# Patient Record
Sex: FEMALE | Race: WHITE | NOT HISPANIC OR LATINO | Employment: FULL TIME | URBAN - METROPOLITAN AREA
[De-identification: names, ages, dates, MRNs, and addresses within clinical notes are randomized per-mention and may not be internally consistent; named-entity substitution may affect disease eponyms.]

---

## 2017-02-22 RX ORDER — MEDROXYPROGESTERONE ACETATE 2.5 MG/1
2.5 TABLET ORAL DAILY
Qty: 90 TABLET | Refills: 1 | Status: SHIPPED | OUTPATIENT
Start: 2017-02-22 | End: 2017-03-07 | Stop reason: SDUPTHER

## 2017-03-07 ENCOUNTER — OFFICE VISIT (OUTPATIENT)
Dept: OBSTETRICS AND GYNECOLOGY | Facility: CLINIC | Age: 57
End: 2017-03-07
Payer: COMMERCIAL

## 2017-03-07 VITALS
DIASTOLIC BLOOD PRESSURE: 68 MMHG | WEIGHT: 142.19 LBS | SYSTOLIC BLOOD PRESSURE: 124 MMHG | BODY MASS INDEX: 25.19 KG/M2

## 2017-03-07 DIAGNOSIS — Z12.31 VISIT FOR SCREENING MAMMOGRAM: ICD-10-CM

## 2017-03-07 DIAGNOSIS — N95.2 POSTMENOPAUSAL ATROPHIC VAGINITIS: ICD-10-CM

## 2017-03-07 DIAGNOSIS — Z86.018 HISTORY OF BENIGN PARATHYROID TUMOR: ICD-10-CM

## 2017-03-07 DIAGNOSIS — Z01.419 ENCOUNTER FOR GYNECOLOGICAL EXAMINATION WITHOUT ABNORMAL FINDING: Primary | ICD-10-CM

## 2017-03-07 DIAGNOSIS — Z12.4 CERVICAL CANCER SCREENING: ICD-10-CM

## 2017-03-07 DIAGNOSIS — Z85.3 HISTORY OF BREAST CANCER: ICD-10-CM

## 2017-03-07 DIAGNOSIS — M81.0 OSTEOPOROSIS: ICD-10-CM

## 2017-03-07 DIAGNOSIS — Z11.51 SCREENING FOR HPV (HUMAN PAPILLOMAVIRUS): ICD-10-CM

## 2017-03-07 DIAGNOSIS — N89.0 VAIN I (VAGINAL INTRAEPITHELIAL NEOPLASIA GRADE I): ICD-10-CM

## 2017-03-07 PROCEDURE — 88175 CYTOPATH C/V AUTO FLUID REDO: CPT

## 2017-03-07 PROCEDURE — 87624 HPV HI-RISK TYP POOLED RSLT: CPT

## 2017-03-07 PROCEDURE — 99396 PREV VISIT EST AGE 40-64: CPT | Mod: S$GLB,,, | Performed by: OBSTETRICS & GYNECOLOGY

## 2017-03-07 PROCEDURE — 99999 PR PBB SHADOW E&M-EST. PATIENT-LVL III: CPT | Mod: PBBFAC,,, | Performed by: OBSTETRICS & GYNECOLOGY

## 2017-03-07 RX ORDER — ESTRADIOL 10 UG/1
1 INSERT VAGINAL
Qty: 24 TABLET | Refills: 3 | Status: SHIPPED | OUTPATIENT
Start: 2017-03-09 | End: 2018-01-26 | Stop reason: SDUPTHER

## 2017-03-07 RX ORDER — MEDROXYPROGESTERONE ACETATE 2.5 MG/1
2.5 TABLET ORAL DAILY
Qty: 90 TABLET | Refills: 3 | Status: SHIPPED | OUTPATIENT
Start: 2017-03-07 | End: 2018-03-14 | Stop reason: SDUPTHER

## 2017-03-07 NOTE — PROGRESS NOTES
Chief Complaint   Patient presents with    Well Woman     Pap today, mammo due in August, order in        History of Present Illness: Renate Vang is a 56 y.o. female that presents today 3/7/2017 for well gyn visit. She reports now getting an implant and having to stop boniva.     Past Medical History:   Diagnosis Date    Abnormal Pap smear of cervix     Anxiety     Basal cell carcinoma 3/2011    right axilla    Breast cancer 2003    right    Depression     History of breast cancer 2003    at 42 s/p surg and radiation T1N0M0 ER+    History of HPV infection     LOW RISK    History of parathyroidectomy     R lower gland removed due to adenoma    Kidney stones     Osteoporosis 2016    PONV (postoperative nausea and vomiting)     Skin cancer     BCC     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)        Past Surgical History:   Procedure Laterality Date    BREAST LUMPECTOMY  2003    x2 Right with senital node bx    BREAST RECONSTRUCTION      s/p lumpectomy to replace previous implants bilaterally    BREAST SURGERY      breast augmentation prior to lumpectomy     SECTION, LOW TRANSVERSE      x2    COLONOSCOPY  2013    repeat in 5    HERNIA REPAIR      bilateral groin     LASER ABLATION OF CONDYLOMAS  2012    MALIGNANT SKIN LESION EXCISION      nose & under right arm    PARATHYROID GLAND SURGERY      R lower gland removed due to adenoma    TONSILLECTOMY         Current Outpatient Prescriptions   Medication Sig Dispense Refill    alprazolam (XANAX) 0.25 MG tablet Take 1 tablet (0.25 mg total) by mouth as needed. 60 tablet 1    [START ON 3/9/2017] estradiol (VAGIFEM) 10 mcg Tab Place 1 tablet (10 mcg total) vaginally twice a week. 24 tablet 3    medroxyPROGESTERone (PROVERA) 2.5 MG tablet Take 1 tablet (2.5 mg total) by mouth once daily. 90 tablet 3    ibandronate (BONIVA) 150 mg tablet Take 1 tablet (150 mg total) by mouth every 30 days. Fasting, stay upright and fasting  for 20 minutes 3 tablet 3    promethazine (PHENERGAN) 25 MG tablet Take 1 tablet (25 mg total) by mouth every 4 (four) hours. 30 tablet 0     No current facility-administered medications for this visit.        Review of patient's allergies indicates:   Allergen Reactions    No known allergies        Family History   Problem Relation Age of Onset    Glaucoma Mother     Osteoarthritis Mother     Arrhythmia Mother     Fibromyalgia Mother     Kidney disease Mother     Cancer Maternal Uncle      colon    Hypertension Father     Hashimoto's thyroiditis Sister     Anxiety disorder Sister     Heart disease Maternal Grandmother     COPD Maternal Grandmother     Tuberculosis Maternal Grandmother     Hypertension Maternal Grandmother     COPD Maternal Grandfather     Osteoarthritis Maternal Grandfather     COPD Paternal Grandfather     Breast cancer Cousin     Breast cancer Maternal Aunt     Ovarian cancer Neg Hx        Social History     Social History    Marital status:      Spouse name: N/A    Number of children: N/A    Years of education: N/A     Occupational History     [employer]     Social History Main Topics    Smoking status: Never Smoker    Smokeless tobacco: Never Used    Alcohol use 0.0 oz/week      Comment: 2 drinks three times per week    Drug use: Yes     Special: Benzodiazepines    Sexual activity: Yes     Partners: Male     Other Topics Concern    Are You Pregnant Or Think You May Be? No    Breast-Feeding No     Social History Narrative       OB History    Para Term  AB SAB TAB Ectopic Multiple Living   3 2   1  1         # Outcome Date GA Lbr Manfred/2nd Weight Sex Delivery Anes PTL Lv   3 TAB            2 Para      CS-LTranv      1 Para      CS-LTranv             Review of Symptoms:  GENERAL: Denies weight gain or weight loss. Feeling well overall.   SKIN: Denies rash or lesions.   HEAD: Denies head injury or headache.   NODES: Denies enlarged lymph  nodes.   CHEST: Denies chest pain or shortness of breath.   CARDIOVASCULAR: Denies palpitations or left sided chest pain.   ABDOMEN: No abdominal pain, constipation, diarrhea, nausea, vomiting or rectal bleeding.   URINARY: No frequency, dysuria, hematuria, or burning on urination.  HEMATOLOGIC: No easy bruisability or excessive bleeding.   MUSCULOSKELETAL: Denies joint pain or swelling.     /68  Wt 64.5 kg (142 lb 3.2 oz)  LMP 09/20/2006  BMI 25.19 kg/m2  Physical Exam:  APPEARANCE: Well nourished, well developed, in no acute distress.  SKIN: Normal skin turgor, no lesions.  NECK: Neck symmetric without masses   RESPIRATORY: Normal respiratory effort with no retractions or use of accessory muscles  CARDIOVASCULAR: Peripheral vascular system with no swelling no varicosities and palpation of pulses normal  LYMPHATIC: No enlargements of the lymph nodes noted in the neck, axillae, or groin  ABDOMEN: Soft. No tenderness or masses. No hepatosplenomegaly. No hernias.  BREASTS: Symmetrical, no skin changes or visible lesions. No palpable masses, nipple discharge or adenopathy bilaterally.  PELVIC: Normal external female genitalia without lesions. Normal hair distribution. Adequate perineal body, normal urethral meatus. Urethra with no masses.  Bladder nontender. Vagina moist and well rugated without lesions or discharge. Cervix pink and without lesions. No significant cystocele or rectocele. Bimanual exam showed uterus normal size, shape, position, mobile and nontender. Adnexa without masses or tenderness. Urethra and bladder normal. PAP DONE  EXTREMITIES: No clubbing cyanosis or edema.    ASSESSMENT/PLAN:  Encounter for gynecological examination without abnormal finding    Cervical cancer screening  -     Liquid-based pap smear, screening    Screening for HPV (human papillomavirus)  -     HPV DNA probe, amplified    Visit for screening mammogram  -     Mammo Digital Screening Bilat With CAD; Future; Expected  date: 3/7/17    Osteoporosis    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)--2012    History of breast cancer--2003    History of benign parathyroid tumor    Postmenopausal atrophic vaginitis  -     medroxyPROGESTERone (PROVERA) 2.5 MG tablet; Take 1 tablet (2.5 mg total) by mouth once daily.  Dispense: 90 tablet; Refill: 3  -     estradiol (VAGIFEM) 10 mcg Tab; Place 1 tablet (10 mcg total) vaginally twice a week.  Dispense: 24 tablet; Refill: 3          Patient was counseled today on Pap guidelines, recommendation for pelvic exams, mammograms every other year after the age of 40 and annually after the age of 50, Colonoscopy after the age of 50, Dexa Bone Scan and calcium and vitamin D supplementation in menopause and to see her PCP for other health maintenance.   FOLLOW-UP:prn

## 2017-03-07 NOTE — MR AVS SNAPSHOT
[facility]  [address] [place] [address]  Phone: [phone]                  Renate Vang   3/7/2017 8:00 AM   Office Visit    Description:  Female : 1960   Provider:  Negra Vargas MD   Department:  [facility]           Reason for Visit     Well Woman           Diagnoses this Visit        Comments    Cervical cancer screening    -  Primary     Screening for HPV (human papillomavirus)                To Do List           Future Appointments        Provider Department Dept Phone    3/9/2018 8:00 AM Negra Vargas MD [facility] [phone]      Goals (5 Years of Data)     None      [facility]     [facility] -  Assistance  Registered nurses in the [facility] provide clinical advisement, health education, appointment booking, and other advisory services.  Call for this free service at [phone].             Medications           Message regarding Medications     Verify the changes and/or additions to your medication regime listed below are the same as discussed with your clinician today.  If any of these changes or additions are incorrect, please notify your healthcare provider.             Verify that the below list of medications is an accurate representation of the medications you are currently taking.  If none reported, the list may be blank. If incorrect, please contact your healthcare provider. Carry this list with you in case of emergency.           Current Medications     alprazolam (XANAX) 0.25 MG tablet Take 1 tablet (0.25 mg total) by mouth as needed.    estradiol (VAGIFEM) 10 mcg Tab Place 1 tablet (10 mcg total) vaginally 3 (three) times a week.    medroxyPROGESTERone (PROVERA) 2.5 MG tablet Take 1 tablet (2.5 mg total) by mouth once daily.    ibandronate (BONIVA) 150 mg tablet Take 1 tablet (150 mg total) by mouth every 30 days. Fasting, stay upright and fasting for 20 minutes     promethazine (PHENERGAN) 25 MG tablet Take 1 tablet (25 mg total) by mouth every 4 (four) hours.           Clinical Reference Information           Your Vitals Were     BP Weight Last Period BMI       124/68 64.5 kg (142 lb 3.2 oz) 09/20/2006 25.19 kg/m2       Blood Pressure          Most Recent Value    BP  124/68      Allergies as of 3/7/2017     No Known Allergies      Immunizations Administered on Date of Encounter - 3/7/2017     None      Orders Placed During Today's Visit      Normal Orders This Visit    HPV DNA probe, amplified     Liquid-based pap smear, screening       Language Assistance Services     ATTENTION: Language assistance services are available, free of charge. Please call [phone].      ATENCIÓN: Si habla malika, tiene a arguello disposición servicios gratuitos de asistencia lingüística. Llame al [phone].     CHÚ Ý: N?u b?n nói Ti?ng Vi?t, có các d?ch v? h? tr? ngôn ng? mi?n phí dành cho b?n. G?i s? [phone].         [facility] complies with applicable Federal civil rights laws and does not discriminate on the basis of race, color, national origin, age, disability, or sex.

## 2017-03-15 LAB — HUMAN PAPILLOMAVIRUS (HPV): NOT DETECTED

## 2017-08-14 ENCOUNTER — HOSPITAL ENCOUNTER (OUTPATIENT)
Dept: RADIOLOGY | Facility: HOSPITAL | Age: 57
Discharge: HOME | End: 2017-08-14
Attending: OBSTETRICS & GYNECOLOGY
Payer: COMMERCIAL

## 2017-08-14 DIAGNOSIS — Z12.31 VISIT FOR SCREENING MAMMOGRAM: ICD-10-CM

## 2017-08-14 PROCEDURE — 77063 BREAST TOMOSYNTHESIS BI: CPT | Mod: 26,,, | Performed by: RADIOLOGY

## 2017-08-14 PROCEDURE — 77067 SCR MAMMO BI INCL CAD: CPT | Mod: TC

## 2017-08-14 PROCEDURE — 77067 SCR MAMMO BI INCL CAD: CPT | Mod: 26,,, | Performed by: RADIOLOGY

## 2017-12-18 ENCOUNTER — OFFICE VISIT (OUTPATIENT)
Dept: FAMILY MEDICINE | Facility: CLINIC | Age: 57
End: 2017-12-18
Payer: COMMERCIAL

## 2017-12-18 VITALS
DIASTOLIC BLOOD PRESSURE: 70 MMHG | RESPIRATION RATE: 12 BRPM | SYSTOLIC BLOOD PRESSURE: 116 MMHG | WEIGHT: 145.06 LBS | BODY MASS INDEX: 25.7 KG/M2 | HEIGHT: 63 IN | TEMPERATURE: 98 F | HEART RATE: 80 BPM

## 2017-12-18 DIAGNOSIS — M54.6 ACUTE LEFT-SIDED THORACIC BACK PAIN: Primary | ICD-10-CM

## 2017-12-18 DIAGNOSIS — N20.0 RENAL CALCULI: ICD-10-CM

## 2017-12-18 DIAGNOSIS — R31.29 OTHER MICROSCOPIC HEMATURIA: ICD-10-CM

## 2017-12-18 LAB
BILIRUB SERPL-MCNC: ABNORMAL MG/DL
BLOOD URINE, POC: ABNORMAL
COLOR, POC UA: CLEAR
GLUCOSE UR QL STRIP: NORMAL
KETONES UR QL STRIP: ABNORMAL
LEUKOCYTE ESTERASE URINE, POC: ABNORMAL
NITRITE, POC UA: ABNORMAL
PH, POC UA: ABNORMAL
PROTEIN, POC: 3
SPECIFIC GRAVITY, POC UA: ABNORMAL
UROBILINOGEN, POC UA: NORMAL

## 2017-12-18 PROCEDURE — 99214 OFFICE O/P EST MOD 30 MIN: CPT | Mod: 25,S$GLB,, | Performed by: NURSE PRACTITIONER

## 2017-12-18 PROCEDURE — 81002 URINALYSIS NONAUTO W/O SCOPE: CPT | Mod: S$GLB,,, | Performed by: NURSE PRACTITIONER

## 2017-12-18 RX ORDER — TRAMADOL HYDROCHLORIDE 50 MG/1
50 TABLET ORAL EVERY 6 HOURS PRN
COMMUNITY
End: 2020-07-27 | Stop reason: SDUPTHER

## 2017-12-18 NOTE — PROGRESS NOTES
"Subjective:       Patient ID: Renate Vang is a 57 y.o. female.    Chief Complaint: left side pain, hx of kidney stones    HPI having some left flank pain. Got really dehydrated about a week ago. Started after that. having some ache in the flank area. Taking pain medication to help with this. She states she has had this in the past. Was noted on CT scan last year to have several renal stones. She states she just wanted to make sure she did not have an infection. She denies any decreased urine flow or inability to void. No fever. See ROS.    The following portion of the patients history was reviewed and updated as appropriate: allergies, current medications, past medical and surgical history. Past social history and problem list reviewed. Family PMH and Past social history reviewed. Tobacco, Illicit drug use reviewed.     Review of Systems  Constitutional: No fatigue or fever    Respiratory:   No SOB, Wheezing, cough  Cardiovascular:   No CP, Palpitations  Gastrointestinal:   No N/V/D. No abdominal pain, weight stable. Appetite good.   Genitourinary:   No dysuria, urgency or frequency. No change in bowels. No blood in stools. Some left sided flank achy type pain.  Musculoskeletal:   No joint pain  No change in gait or coordination. .  Neurological:   No dizziness. No headaches    Objective:     /70   Pulse 80   Temp 98.2 °F (36.8 °C) (Oral)   Resp 12   Ht 5' 3" (1.6 m)   Wt 65.8 kg (145 lb 1 oz)   LMP 09/20/2006   BMI 25.70 kg/m²      Physical Exam     Constitutional: oriented to person, place, and time. well-developed and well-nourished.   Cardiovascular: Normal rate, regular rhythm and normal heart sounds.    Pulmonary/Chest: Effort normal and breath sounds normal. No respiratory distress. No wheezes.   GI: no pain with percussion over left flank area  Musculoskeletal: Normal range of motion. Gait and coordination normal.     Assessment:       1. Acute left-sided thoracic back pain    2. Other " microscopic hematuria    3. Renal calculi        Plan:         Renate was seen today for left side pain, hx of kidney stones.    Diagnoses and all orders for this visit:    Acute left-sided thoracic back pain: no indication of infection. She does not want to have repeat CT scan done. She just wanted to make sure there was no need for antibiotics. She will follow up if things do not improve.   -     POCT URINE DIPSTICK WITHOUT MICROSCOPE    Other microscopic hematuria    Renal calculi    Continue current medication  Take medications only as prescribed  Healthy diet, exercise  Adequate rest  Adequate hydration  Avoid allergens  Avoid excessive caffeine

## 2018-01-26 DIAGNOSIS — N95.2 POSTMENOPAUSAL ATROPHIC VAGINITIS: ICD-10-CM

## 2018-01-26 RX ORDER — ESTRADIOL 10 UG/1
TABLET VAGINAL
Qty: 24 TABLET | Refills: 3 | Status: SHIPPED | OUTPATIENT
Start: 2018-01-26 | End: 2018-04-17 | Stop reason: SDUPTHER

## 2018-03-14 DIAGNOSIS — N95.2 POSTMENOPAUSAL ATROPHIC VAGINITIS: ICD-10-CM

## 2018-03-14 RX ORDER — MEDROXYPROGESTERONE ACETATE 2.5 MG/1
TABLET ORAL
Qty: 90 TABLET | Refills: 3 | Status: SHIPPED | OUTPATIENT
Start: 2018-03-14 | End: 2018-04-17 | Stop reason: SDUPTHER

## 2018-04-17 ENCOUNTER — OFFICE VISIT (OUTPATIENT)
Dept: OBSTETRICS AND GYNECOLOGY | Facility: CLINIC | Age: 58
End: 2018-04-17
Payer: COMMERCIAL

## 2018-04-17 VITALS — BODY MASS INDEX: 26.21 KG/M2 | WEIGHT: 142.44 LBS | HEIGHT: 62 IN

## 2018-04-17 DIAGNOSIS — N95.2 POSTMENOPAUSAL ATROPHIC VAGINITIS: ICD-10-CM

## 2018-04-17 DIAGNOSIS — Z12.4 PAP SMEAR FOR CERVICAL CANCER SCREENING: ICD-10-CM

## 2018-04-17 DIAGNOSIS — Z12.11 COLON CANCER SCREENING: ICD-10-CM

## 2018-04-17 DIAGNOSIS — Z01.419 ENCOUNTER FOR GYNECOLOGICAL EXAMINATION WITHOUT ABNORMAL FINDING: Primary | ICD-10-CM

## 2018-04-17 DIAGNOSIS — N89.0 VAIN I (VAGINAL INTRAEPITHELIAL NEOPLASIA GRADE I): ICD-10-CM

## 2018-04-17 DIAGNOSIS — M81.0 OSTEOPOROSIS, UNSPECIFIED OSTEOPOROSIS TYPE, UNSPECIFIED PATHOLOGICAL FRACTURE PRESENCE: ICD-10-CM

## 2018-04-17 DIAGNOSIS — Z11.51 SCREENING FOR HPV (HUMAN PAPILLOMAVIRUS): ICD-10-CM

## 2018-04-17 PROCEDURE — 88141 CYTOPATH C/V INTERPRET: CPT | Mod: ,,, | Performed by: PATHOLOGY

## 2018-04-17 PROCEDURE — 99999 PR PBB SHADOW E&M-EST. PATIENT-LVL III: CPT | Mod: PBBFAC,,, | Performed by: OBSTETRICS & GYNECOLOGY

## 2018-04-17 PROCEDURE — 87624 HPV HI-RISK TYP POOLED RSLT: CPT

## 2018-04-17 PROCEDURE — 99396 PREV VISIT EST AGE 40-64: CPT | Mod: S$GLB,,, | Performed by: OBSTETRICS & GYNECOLOGY

## 2018-04-17 PROCEDURE — 88175 CYTOPATH C/V AUTO FLUID REDO: CPT | Performed by: PATHOLOGY

## 2018-04-17 RX ORDER — IBANDRONATE SODIUM 150 MG/1
150 TABLET, FILM COATED ORAL
Qty: 3 TABLET | Refills: 3 | Status: SHIPPED | OUTPATIENT
Start: 2018-04-17 | End: 2019-04-18

## 2018-04-17 RX ORDER — MEDROXYPROGESTERONE ACETATE 2.5 MG/1
2.5 TABLET ORAL DAILY
Qty: 90 TABLET | Refills: 3 | Status: SHIPPED | OUTPATIENT
Start: 2018-04-17 | End: 2019-04-30 | Stop reason: SDUPTHER

## 2018-04-17 RX ORDER — ESTRADIOL 10 UG/1
INSERT VAGINAL
Qty: 24 TABLET | Refills: 3 | Status: SHIPPED | OUTPATIENT
Start: 2018-04-17 | End: 2019-04-18 | Stop reason: SDUPTHER

## 2018-04-17 NOTE — PROGRESS NOTES
Chief Complaint   Patient presents with    Well Woman       History of Present Illness: Renate aVng is a 57 y.o. female that presents today 2018 for well gyn visit.    Past Medical History:   Diagnosis Date    Abnormal Pap smear of cervix     Anxiety     Basal cell carcinoma 3/2011    right axilla    Breast cancer 2003    right    Depression     History of breast cancer 2003    at 42 s/p surg and radiation T1N0M0 ER+    History of HPV infection     LOW RISK    History of parathyroidectomy     R lower gland removed due to adenoma    Kidney stones     Osteoporosis 2016    PONV (postoperative nausea and vomiting)     Skin cancer     BCC     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) 2012       Past Surgical History:   Procedure Laterality Date    BREAST LUMPECTOMY  2003    x2 Right with senital node bx    BREAST RECONSTRUCTION      s/p lumpectomy to replace previous implants bilaterally    BREAST SURGERY      breast augmentation prior to lumpectomy     SECTION, LOW TRANSVERSE      x2    COLONOSCOPY  2013    repeat in 5    HERNIA REPAIR      bilateral groin     LASER ABLATION OF CONDYLOMAS  2012    MALIGNANT SKIN LESION EXCISION      nose & under right arm    PARATHYROID GLAND SURGERY      R lower gland removed due to adenoma    TONSILLECTOMY         Current Outpatient Prescriptions   Medication Sig Dispense Refill    alprazolam (XANAX) 0.25 MG tablet Take 1 tablet (0.25 mg total) by mouth as needed. 60 tablet 1    estradiol (YUVAFEM) 10 mcg Tab INSERT 1 TABLET VAGINALLY 2TIMES A WEEK 24 tablet 3    medroxyPROGESTERone (PROVERA) 2.5 MG tablet Take 1 tablet (2.5 mg total) by mouth once daily. 90 tablet 3    ibandronate (BONIVA) 150 mg tablet Take 1 tablet (150 mg total) by mouth every 30 days. Fasting, stay upright and fasting for 20 minutes 3 tablet 3    promethazine (PHENERGAN) 25 MG tablet Take 1 tablet (25 mg total) by mouth every 4 (four) hours. 30 tablet 0     traMADol (ULTRAM) 50 mg tablet Take 50 mg by mouth every 6 (six) hours as needed for Pain.       No current facility-administered medications for this visit.        Review of patient's allergies indicates:   Allergen Reactions    No known allergies        Family History   Problem Relation Age of Onset    Glaucoma Mother     Osteoarthritis Mother     Arrhythmia Mother     Fibromyalgia Mother     Kidney disease Mother     Cancer Maternal Uncle      colon    Hypertension Father     Hashimoto's thyroiditis Sister     Anxiety disorder Sister     Heart disease Maternal Grandmother     COPD Maternal Grandmother     Tuberculosis Maternal Grandmother     Hypertension Maternal Grandmother     COPD Maternal Grandfather     Osteoarthritis Maternal Grandfather     COPD Paternal Grandfather     Breast cancer Cousin     Breast cancer Maternal Aunt     Ovarian cancer Neg Hx        Social History     Social History    Marital status:      Spouse name: N/A    Number of children: N/A    Years of education: N/A     Occupational History     [employer]     Social History Main Topics    Smoking status: Never Smoker    Smokeless tobacco: Never Used    Alcohol use 2.4 oz/week     4 Glasses of wine per week      Comment: 2 drinks three times per week    Drug use: Yes     Types: Benzodiazepines    Sexual activity: Yes     Partners: Male     Birth control/ protection: Post-menopausal     Other Topics Concern    Are You Pregnant Or Think You May Be? No    Breast-Feeding No     Social History Narrative    None       OB History    Para Term  AB Living   5 4 2   1     SAB TAB Ectopic Multiple Live Births     1            # Outcome Date GA Lbr Manfred/2nd Weight Sex Delivery Anes PTL Lv   5 Term            4 Term            3 TAB            2 Para      CS-LTranv      1 Para      CS-LTranv             Review of Symptoms:  GENERAL: Denies weight gain or weight loss. Feeling well overall.  "  SKIN: Denies rash or lesions.   HEAD: Denies head injury or headache.   NODES: Denies enlarged lymph nodes.   CHEST: Denies chest pain or shortness of breath.   CARDIOVASCULAR: Denies palpitations or left sided chest pain.   ABDOMEN: No abdominal pain, constipation, diarrhea, nausea, vomiting or rectal bleeding.   URINARY: No frequency, dysuria, hematuria, or burning on urination.  HEMATOLOGIC: No easy bruisability or excessive bleeding.   MUSCULOSKELETAL: Denies joint pain or swelling.     Ht 5' 2" (1.575 m)   Wt 64.6 kg (142 lb 6.7 oz)   LMP 09/20/2006   Physical Exam:  APPEARANCE: Well nourished, well developed, in no acute distress.  SKIN: Normal skin turgor, no lesions.  NECK: Neck symmetric without masses   RESPIRATORY: Normal respiratory effort with no retractions or use of accessory muscles  CARDIOVASCULAR: Peripheral vascular system with no swelling no varicosities and palpation of pulses normal  LYMPHATIC: No enlargements of the lymph nodes noted in the neck, axillae, or groin  ABDOMEN: Soft. No tenderness or masses. No hepatosplenomegaly. No hernias.  BREASTS: Symmetrical, no skin changes or visible lesions. No palpable masses, nipple discharge or adenopathy bilaterally.  PELVIC: Normal external female genitalia without lesions. Normal hair distribution. Adequate perineal body, normal urethral meatus. Urethra with no masses.  Bladder nontender. Vagina moist and well rugated without lesions or discharge. Cervix pink and without lesions. No significant cystocele or rectocele. Bimanual exam showed uterus normal size, shape, position, mobile and nontender. Adnexa without masses or tenderness. Urethra and bladder normal.   EXTREMITIES: No clubbing cyanosis or edema.    ASSESSMENT/PLAN:  Encounter for gynecological examination without abnormal finding    Osteoporosis, unspecified osteoporosis type, unspecified pathological fracture presence  -     DXA Bone Density Spine And Hip; Future; Expected date: " 04/17/2018    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)--2012    Postmenopausal atrophic vaginitis  -     estradiol (YUVAFEM) 10 mcg Tab; INSERT 1 TABLET VAGINALLY 2TIMES A WEEK  Dispense: 24 tablet; Refill: 3  -     medroxyPROGESTERone (PROVERA) 2.5 MG tablet; Take 1 tablet (2.5 mg total) by mouth once daily.  Dispense: 90 tablet; Refill: 3    Colon cancer screening  -     Case request GI: COLONOSCOPY    Other orders  -     ibandronate (BONIVA) 150 mg tablet; Take 1 tablet (150 mg total) by mouth every 30 days. Fasting, stay upright and fasting for 20 minutes  Dispense: 3 tablet; Refill: 3          Patient was counseled today on Pap guidelines, recommendation for pelvic exams, mammograms every other year after the age of 40 and annually after the age of 50, Colonoscopy after the age of 50, Dexa Bone Scan and calcium and vitamin D supplementation in menopause and to see her PCP for other health maintenance.   FOLLOW-UP:prn

## 2018-04-23 ENCOUNTER — HOSPITAL ENCOUNTER (OUTPATIENT)
Dept: RADIOLOGY | Facility: HOSPITAL | Age: 58
Discharge: HOME | End: 2018-04-23
Attending: OBSTETRICS & GYNECOLOGY
Payer: COMMERCIAL

## 2018-04-23 DIAGNOSIS — M81.0 OSTEOPOROSIS, UNSPECIFIED OSTEOPOROSIS TYPE, UNSPECIFIED PATHOLOGICAL FRACTURE PRESENCE: ICD-10-CM

## 2018-04-23 LAB
HPV16 AG SPEC QL: NEGATIVE
HPV16+18+H RISK 12 DNA CVX-IMP: NEGATIVE
HPV18 DNA SPEC QL NAA+PROBE: NEGATIVE

## 2018-04-23 PROCEDURE — 77080 DXA BONE DENSITY AXIAL: CPT | Mod: TC,PO

## 2018-04-23 PROCEDURE — 77080 DXA BONE DENSITY AXIAL: CPT | Mod: 26,,, | Performed by: RADIOLOGY

## 2018-05-02 ENCOUNTER — TELEPHONE (OUTPATIENT)
Dept: GASTROENTEROLOGY | Facility: CLINIC | Age: 58
End: 2018-05-02

## 2018-05-02 NOTE — TELEPHONE ENCOUNTER
Received request for pt to have screening colonoscopy    Reviewed chart pt's last colonoscopy was 9/20/13  The type polyp recommended repeat in 5 years      Pt informed.  Discussed either scheduling it now for   September or reminder to be entered to mail out to her closed to due date.  Pt prefers to wait until closer to September    Just FYI if we do preventative colon before the recommended repeat date(even within days)  insurance will not consider it preventative.

## 2018-07-23 ENCOUNTER — OFFICE VISIT (OUTPATIENT)
Dept: DERMATOLOGY | Facility: CLINIC | Age: 58
End: 2018-07-23
Payer: COMMERCIAL

## 2018-07-23 VITALS — BODY MASS INDEX: 26.13 KG/M2 | WEIGHT: 142 LBS | HEIGHT: 62 IN | RESPIRATION RATE: 16 BRPM

## 2018-07-23 DIAGNOSIS — D23.9 DERMATOFIBROMA: ICD-10-CM

## 2018-07-23 DIAGNOSIS — I78.1 TELANGIECTASIA: ICD-10-CM

## 2018-07-23 DIAGNOSIS — L57.0 ACTINIC KERATOSIS: Primary | ICD-10-CM

## 2018-07-23 DIAGNOSIS — D18.00 ANGIOMA: ICD-10-CM

## 2018-07-23 DIAGNOSIS — Z85.828 PERSONAL HISTORY OF MALIGNANT NEOPLASM OF SKIN: ICD-10-CM

## 2018-07-23 DIAGNOSIS — D22.9 BENIGN NEVUS: ICD-10-CM

## 2018-07-23 DIAGNOSIS — L72.0 MILIUM: ICD-10-CM

## 2018-07-23 DIAGNOSIS — D48.5 NEOPLASM OF UNCERTAIN BEHAVIOR OF SKIN: ICD-10-CM

## 2018-07-23 DIAGNOSIS — L82.1 SEBORRHEIC KERATOSES: ICD-10-CM

## 2018-07-23 DIAGNOSIS — L81.4 LENTIGINES: ICD-10-CM

## 2018-07-23 PROCEDURE — 3008F BODY MASS INDEX DOCD: CPT | Mod: CPTII,S$GLB,, | Performed by: DERMATOLOGY

## 2018-07-23 PROCEDURE — 11100 PR BIOPSY OF SKIN LESION: CPT | Mod: S$GLB,,, | Performed by: DERMATOLOGY

## 2018-07-23 PROCEDURE — 88305 TISSUE EXAM BY PATHOLOGIST: CPT | Performed by: PATHOLOGY

## 2018-07-23 PROCEDURE — 99999 PR PBB SHADOW E&M-EST. PATIENT-LVL III: CPT | Mod: PBBFAC,,, | Performed by: DERMATOLOGY

## 2018-07-23 PROCEDURE — 99203 OFFICE O/P NEW LOW 30 MIN: CPT | Mod: 25,S$GLB,, | Performed by: DERMATOLOGY

## 2018-07-23 RX ORDER — FLUOROURACIL 50 MG/G
CREAM TOPICAL 2 TIMES DAILY
Qty: 40 G | Refills: 1 | Status: SHIPPED | OUTPATIENT
Start: 2018-07-23 | End: 2018-08-17

## 2018-07-23 NOTE — LETTER
July 23, 2018      Sameera Dudley MD  [address] [address] [address]           [facility]  [address] [facility] [address] [place] [address]  Phone: [phone]  Fax: [phone]          Patient: Renate Vang   MR Number: [id number]   YOB: 1960   Date of Visit: 7/23/2018       Dear Dr. Sameera Dudley:    Thank you for referring Renate Vang to me for evaluation. Attached you will find relevant portions of my assessment and plan of care.    If you have questions, please do not hesitate to call me. I look forward to following Renate Vang along with you.    Sincerely,    Gemma Tan MD    Enclosure  CC:  No Recipients    If you would like to receive this communication electronically, please contact [email or web address] or [phone] to request more information on [facility] [employer] access.    For providers and/or their staff who would like to refer a patient to [facility], please contact us through our one-stop-shop provider referral line, [facility], at [phone].    If you feel you have received this communication in error or would no longer like to receive these types of communications, please e-mail [email or web address]

## 2018-08-02 ENCOUNTER — TELEPHONE (OUTPATIENT)
Dept: DERMATOLOGY | Facility: CLINIC | Age: 58
End: 2018-08-02

## 2018-08-02 NOTE — TELEPHONE ENCOUNTER
----- Message from Gemma Tan MD sent at 8/2/2018  8:04 AM CDT -----  RIGHT CHEST:  Basal cell carcinoma, nodular type.  The lesion involves the deep margin of the biopsy    Please schedule pt for 30 min procedure for ED&C vs excision   HETAL

## 2018-08-17 ENCOUNTER — PROCEDURE VISIT (OUTPATIENT)
Dept: DERMATOLOGY | Facility: CLINIC | Age: 58
End: 2018-08-17
Payer: COMMERCIAL

## 2018-08-17 VITALS — WEIGHT: 142 LBS | BODY MASS INDEX: 26.13 KG/M2 | HEIGHT: 62 IN

## 2018-08-17 DIAGNOSIS — C44.519 BASAL CELL CARCINOMA (BCC) OF CHEST: Primary | ICD-10-CM

## 2018-08-17 PROCEDURE — 17261 DSTRJ MAL LES T/A/L .6-1.0CM: CPT | Mod: S$GLB,,, | Performed by: DERMATOLOGY

## 2018-08-17 PROCEDURE — 99499 UNLISTED E&M SERVICE: CPT | Mod: S$GLB,,, | Performed by: DERMATOLOGY

## 2018-08-17 NOTE — PROGRESS NOTES
Subjective:       Patient ID:  Renate Vang is a 57 y.o. female who presents for   Chief Complaint   Patient presents with    Basal Cell Carcinoma     Pt here for treatment of BCC    FINAL PATHOLOGIC DIAGNOSIS  RIGHT CHEST:  Basal cell carcinoma, nodular type.  The lesion involves the deep margin of the biopsy.        Review of Systems   Skin: Positive for daily sunscreen use and activity-related sunscreen use. Negative for itching, rash, tendency to form keloidal scars and recent sunburn.   Hematologic/Lymphatic: Does not bruise/bleed easily.   Allergic/Immunologic: Positive for environmental allergies.        Objective:    Physical Exam   Skin:   Areas Examined (abnormalities noted in diagram):   Head / Face Inspection Performed  Chest / Axilla Inspection Performed              Diagram Legend      See annotation      Assessment / Plan:        Basal cell carcinoma (BCC) of chest    Discussed excision vs ED&C, risk of recurrence, potential for keloid to form on the chest  Decided to proceed with ED&C of BCC on the R chest bx done on 7/23/18:  Electrodessication and Curettage Procedure note:    Verbal consent obtained. Lesional tissue marked and prepped with alcohol. Lesion anesthetized with 1% lidocaine with epinephrine. Curettage and Desiccation x 3 cycles to base. Aluminum chloride for hemostasis. Lesion size after primary curettage: 9 mm    Area bandaged and wound care explained.           Follow-up in about 3 months (around 11/17/2018).

## 2018-08-21 ENCOUNTER — HOSPITAL ENCOUNTER (OUTPATIENT)
Dept: RADIOLOGY | Facility: HOSPITAL | Age: 58
Discharge: HOME | End: 2018-08-21
Attending: NURSE PRACTITIONER
Payer: COMMERCIAL

## 2018-08-21 DIAGNOSIS — Z12.39 ENCOUNTER FOR SPECIAL SCREENING EXAMINATION FOR NEOPLASM OF BREAST: ICD-10-CM

## 2018-08-21 PROCEDURE — 77067 SCR MAMMO BI INCL CAD: CPT | Mod: TC,PO

## 2018-08-21 PROCEDURE — 77067 SCR MAMMO BI INCL CAD: CPT | Mod: 26,,, | Performed by: RADIOLOGY

## 2018-08-21 PROCEDURE — 77063 BREAST TOMOSYNTHESIS BI: CPT | Mod: 26,,, | Performed by: RADIOLOGY

## 2018-08-23 ENCOUNTER — PATIENT MESSAGE (OUTPATIENT)
Dept: OBSTETRICS AND GYNECOLOGY | Facility: CLINIC | Age: 58
End: 2018-08-23

## 2018-08-23 DIAGNOSIS — R92.8 ABNORMAL MAMMOGRAM OF LEFT BREAST: Primary | ICD-10-CM

## 2018-08-23 NOTE — TELEPHONE ENCOUNTER
Are there any openings for dx mammogram sooner thatn 9/5/18. Hx of breast ca with abnormal mammogram.

## 2018-08-24 ENCOUNTER — TELEPHONE (OUTPATIENT)
Dept: RADIOLOGY | Facility: HOSPITAL | Age: 58
End: 2018-08-24

## 2018-08-24 NOTE — TELEPHONE ENCOUNTER
Appointment scheduled on 9/5/2018 for diagnostic mammogram  Patient will call back to rescheduled mammogram

## 2018-08-29 ENCOUNTER — HOSPITAL ENCOUNTER (OUTPATIENT)
Dept: RADIOLOGY | Facility: HOSPITAL | Age: 58
Discharge: HOME | End: 2018-08-29
Attending: OBSTETRICS & GYNECOLOGY
Payer: COMMERCIAL

## 2018-08-29 DIAGNOSIS — R92.8 ABNORMAL MAMMOGRAM OF LEFT BREAST: ICD-10-CM

## 2018-08-29 PROCEDURE — 77065 DX MAMMO INCL CAD UNI: CPT | Mod: TC,PO,LT

## 2018-08-29 PROCEDURE — 77065 DX MAMMO INCL CAD UNI: CPT | Mod: 26,LT,, | Performed by: RADIOLOGY

## 2018-08-29 PROCEDURE — 77061 BREAST TOMOSYNTHESIS UNI: CPT | Mod: TC,PO,LT

## 2018-08-29 PROCEDURE — 76642 ULTRASOUND BREAST LIMITED: CPT | Mod: 26,LT,, | Performed by: RADIOLOGY

## 2018-08-29 PROCEDURE — 77061 BREAST TOMOSYNTHESIS UNI: CPT | Mod: 26,LT,, | Performed by: RADIOLOGY

## 2018-08-29 PROCEDURE — 76642 ULTRASOUND BREAST LIMITED: CPT | Mod: TC,PO,LT

## 2018-09-10 ENCOUNTER — PATIENT MESSAGE (OUTPATIENT)
Dept: OBSTETRICS AND GYNECOLOGY | Facility: CLINIC | Age: 58
End: 2018-09-10

## 2018-09-12 ENCOUNTER — TELEPHONE (OUTPATIENT)
Dept: GASTROENTEROLOGY | Facility: CLINIC | Age: 58
End: 2018-09-12

## 2018-09-12 NOTE — TELEPHONE ENCOUNTER
----- Message from Alisia Henderson sent at 9/12/2018 12:12 PM CDT -----  Contact: Patient  Patient would like to schedule a colonoscopy, please call to schedule [phone]

## 2018-09-17 ENCOUNTER — TELEPHONE (OUTPATIENT)
Dept: GASTROENTEROLOGY | Facility: CLINIC | Age: 58
End: 2018-09-17

## 2018-09-17 NOTE — TELEPHONE ENCOUNTER
----- Message from Paulina Correa sent at 9/17/2018  2:14 PM CDT -----  Contact: Renate  Type:  Patient Returning Call    Who Called:  patient  Who Left Message for Patient:  Jennifer   Does the patient know what this is regarding?:  appointment  Best Call Back Number:  [phone]  Additional Information:  na

## 2018-11-01 ENCOUNTER — TELEPHONE (OUTPATIENT)
Dept: GASTROENTEROLOGY | Facility: CLINIC | Age: 58
End: 2018-11-01

## 2018-11-01 NOTE — TELEPHONE ENCOUNTER
----- Message from Nathaly Moran sent at 11/1/2018  8:20 AM CDT -----  Contact: pt  Pt calling states would like to speak to the nurse concerning she took an aleve and wants to know if it will effect her upcoming colonoscopy....[phone]

## 2018-11-05 RX ORDER — GLUCOSAMINE/CHONDRO SU A 500-400 MG
1 TABLET ORAL 2 TIMES DAILY
COMMUNITY

## 2018-11-05 RX ORDER — MAGNESIUM 30 MG
TABLET ORAL ONCE
COMMUNITY
End: 2021-06-01 | Stop reason: ALTCHOICE

## 2018-11-05 RX ORDER — ZINC GLUCONATE 50 MG
25 TABLET ORAL DAILY
COMMUNITY

## 2018-11-05 NOTE — H&P
History & Physical - Short Stay  Gastroenterology      SUBJECTIVE:     Procedure: Colonoscopy    Chief Complaint/Indication for Procedure: Screening    History of Present Illness:  Asymptomatic    Last colonoscopy 9/20/2013  Impression:  - One 1 mm polyp at the hepatic flexure. Resected                        and retrieved.                       - One 1 mm polyp in the cecum. Resected and                        retrieved.                       - The examination was otherwise normal.                       - The examined portion of the ileum was normal.  Recommendation:      - Discharge patient to home.                       - Await pathology results.                       - If the pathology report reveals adenomatous                        tissue, then repeat the colonoscopy for surveillance                        in 5 years.                       - If the pathology report indicates hyperplastic                        polyp, then repeat colonoscopy for surveillance in                        6-7 years (age 59).                       - Call the G.I. clinic in 2 weeks for reports (if                        you haven't heard from us sooner) [phone].                       - High fiber diet.                       - Take a PROBIOTIC, such as a carton of GREEK YOGURT                        (Chobani or Oikos, or Activia or Dannon); or tablets                        of ALIGN or CULTURELLE or TIO-Q (all                        non-prescription), every day for a month.  Khanh Forrester MD  9/20/2013     No medications prior to admission.       Review of patient's allergies indicates:   Allergen Reactions    Latex, natural rubber         Past Medical History:   Diagnosis Date    Abnormal Pap smear of cervix     Anxiety resolved    Basal cell carcinoma 3/2011    right axilla    Breast cancer 2003    right    Depression     History of breast cancer 2003    at 42 s/p surg and radiation T1N0M0 ER+    History of HPV  infection     LOW RISK    History of parathyroidectomy     R lower gland removed due to adenoma    Kidney stones     Osteoporosis 2016    PONV (postoperative nausea and vomiting)     Skin cancer     BCC     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) 2012     Past Surgical History:   Procedure Laterality Date    BREAST LUMPECTOMY  2003    x2 Right with senital node bx    BREAST RECONSTRUCTION      s/p lumpectomy to replace previous implants bilaterally    BREAST SURGERY      breast augmentation prior to lumpectomy     SECTION, LOW TRANSVERSE      x2    COLONOSCOPY  2013    repeat in 5    COLONOSCOPY N/A 2013    Performed by [facility] Jr[facility] at [facility]    HERNIA REPAIR      bilateral groin     LASER ABLATION OF CONDYLOMAS  2012    MALIGNANT SKIN LESION EXCISION      nose & under right arm    PARATHYROID GLAND SURGERY      R lower gland removed due to adenoma    TONSILLECTOMY       Family History   Problem Relation Age of Onset    Glaucoma Mother     Osteoarthritis Mother     Arrhythmia Mother     Fibromyalgia Mother     Kidney disease Mother     Cancer Maternal Uncle         colon    Hypertension Father     Hashimoto's thyroiditis Sister     Anxiety disorder Sister     Heart disease Maternal Grandmother     COPD Maternal Grandmother     Tuberculosis Maternal Grandmother     Hypertension Maternal Grandmother     COPD Maternal Grandfather     Osteoarthritis Maternal Grandfather     COPD Paternal Grandfather     Breast cancer Cousin     Breast cancer Maternal Aunt     Ovarian cancer Neg Hx      Social History     Tobacco Use    Smoking status: Never Smoker    Smokeless tobacco: Never Used   Substance Use Topics    Alcohol use: Yes     Alcohol/week: 2.4 oz     Types: 4 Glasses of wine per week     Comment: 2 drinks three times per week    Drug use: Yes     Types: Benzodiazepines         OBJECTIVE:     Vital Signs (Most Recent)       Physical Exam:  :                                                         GENERAL:  Comfortable, in no acute distress.                                 HEENT EXAM:  Nonicteric.  No adenopathy.  Oropharynx is clear.               NECK:  Supple.                                                               LUNGS:  Clear.                                                               CARDIAC:  Regular rate and rhythm.  S1, S2.  No murmur.                      ABDOMEN:  Soft, positive bowel sounds, nontender.  No hepatosplenomegaly or masses.  No rebound or guarding.                                             EXTREMITIES:  No edema.     MENTAL STATUS:  Alert and oriented.    ASSESSMENT/PLAN:     Assessment: Colorectal cancer screening    Plan: Colonoscopy    Anesthesia Plan:   MAC / General Anaesthesia    ASA Grade: ASA 2 - Patient with mild systemic disease with no functional limitations    MALLAMPATI SCORE: I (soft palate, uvula, fauces, and tonsillar pillars visible)

## 2018-11-06 ENCOUNTER — ANESTHESIA EVENT (OUTPATIENT)
Dept: ENDOSCOPY | Facility: HOSPITAL | Age: 58
End: 2018-11-06
Payer: COMMERCIAL

## 2018-11-06 ENCOUNTER — ANESTHESIA (OUTPATIENT)
Dept: ENDOSCOPY | Facility: HOSPITAL | Age: 58
End: 2018-11-06
Payer: COMMERCIAL

## 2018-11-06 ENCOUNTER — HOSPITAL ENCOUNTER (OUTPATIENT)
Facility: HOSPITAL | Age: 58
Discharge: HOME | End: 2018-11-06
Attending: INTERNAL MEDICINE | Admitting: INTERNAL MEDICINE
Payer: COMMERCIAL

## 2018-11-06 VITALS
BODY MASS INDEX: 24.63 KG/M2 | DIASTOLIC BLOOD PRESSURE: 66 MMHG | RESPIRATION RATE: 14 BRPM | WEIGHT: 139 LBS | HEART RATE: 72 BPM | OXYGEN SATURATION: 99 % | TEMPERATURE: 97 F | HEIGHT: 63 IN | SYSTOLIC BLOOD PRESSURE: 114 MMHG

## 2018-11-06 DIAGNOSIS — Z86.010 HISTORY OF COLON POLYPS: ICD-10-CM

## 2018-11-06 PROBLEM — Z86.0100 HISTORY OF COLON POLYPS: Status: ACTIVE | Noted: 2018-11-06

## 2018-11-06 PROCEDURE — 37000009 HC ANESTHESIA EA ADD 15 MINS: Mod: PO | Performed by: INTERNAL MEDICINE

## 2018-11-06 PROCEDURE — 37000008 HC ANESTHESIA 1ST 15 MINUTES: Mod: PO | Performed by: INTERNAL MEDICINE

## 2018-11-06 PROCEDURE — 45385 COLONOSCOPY W/LESION REMOVAL: CPT | Mod: PO | Performed by: INTERNAL MEDICINE

## 2018-11-06 PROCEDURE — 63600175 PHARM REV CODE 636 W HCPCS: Mod: PO | Performed by: NURSE ANESTHETIST, CERTIFIED REGISTERED

## 2018-11-06 PROCEDURE — 88305 TISSUE EXAM BY PATHOLOGIST: CPT | Performed by: PATHOLOGY

## 2018-11-06 PROCEDURE — 25000003 PHARM REV CODE 250: Mod: PO | Performed by: INTERNAL MEDICINE

## 2018-11-06 PROCEDURE — D9220A PRA ANESTHESIA: Mod: 33,CRNA,, | Performed by: NURSE ANESTHETIST, CERTIFIED REGISTERED

## 2018-11-06 PROCEDURE — D9220A PRA ANESTHESIA: Mod: 33,ANES,, | Performed by: ANESTHESIOLOGY

## 2018-11-06 PROCEDURE — 27201089 HC SNARE, DISP (ANY): Mod: PO | Performed by: INTERNAL MEDICINE

## 2018-11-06 PROCEDURE — 45385 COLONOSCOPY W/LESION REMOVAL: CPT | Mod: 33,,, | Performed by: INTERNAL MEDICINE

## 2018-11-06 RX ORDER — LIDOCAINE HCL/PF 100 MG/5ML
SYRINGE (ML) INTRAVENOUS
Status: DISCONTINUED | OUTPATIENT
Start: 2018-11-06 | End: 2018-11-06

## 2018-11-06 RX ORDER — SODIUM CHLORIDE, SODIUM LACTATE, POTASSIUM CHLORIDE, CALCIUM CHLORIDE 600; 310; 30; 20 MG/100ML; MG/100ML; MG/100ML; MG/100ML
INJECTION, SOLUTION INTRAVENOUS CONTINUOUS
Status: DISCONTINUED | OUTPATIENT
Start: 2018-11-06 | End: 2018-11-06 | Stop reason: HOSPADM

## 2018-11-06 RX ORDER — PROPOFOL 10 MG/ML
VIAL (ML) INTRAVENOUS
Status: DISCONTINUED | OUTPATIENT
Start: 2018-11-06 | End: 2018-11-06

## 2018-11-06 RX ADMIN — PROPOFOL 20 MG: 10 INJECTION, EMULSION INTRAVENOUS at 08:11

## 2018-11-06 RX ADMIN — SODIUM CHLORIDE, SODIUM LACTATE, POTASSIUM CHLORIDE, AND CALCIUM CHLORIDE: .6; .31; .03; .02 INJECTION, SOLUTION INTRAVENOUS at 08:11

## 2018-11-06 RX ADMIN — LIDOCAINE HYDROCHLORIDE 50 MG: 20 INJECTION, SOLUTION INTRAVENOUS at 08:11

## 2018-11-06 RX ADMIN — PROPOFOL 100 MG: 10 INJECTION, EMULSION INTRAVENOUS at 08:11

## 2018-11-06 NOTE — H&P
History & Physical - Short Stay  Gastroenterology      SUBJECTIVE:     Procedure: Colonoscopy    Chief Complaint/Indication for Procedure: Screening    History of Present Illness:  Asymptomatic    Jennifer PalaciosSHANTHI      5/2/18 2:51 PM   Note      Received request for pt to have screening colonoscopy     Reviewed chart pt's last colonoscopy was 9/20/13  The type polyp recommended repeat in 5 years       Pt informed.  Discussed either scheduling it now for   September or reminder to be entered to mail out to her closed to due date.  Pt prefers to wait until closer to September     Just FYI if we do preventative colon before the recommended repeat date(even within days)  insurance will not consider it preventative.          Colonoscopy 9/20/2013   Impression:  - One 1 mm polyp at the hepatic flexure. Resected and retrieved.                       - One 1 mm polyp in the cecum. Resected and retrieved.                       - The examination was otherwise normal.                       - The examined portion of the ileum was normal.  Recommendation:      - Discharge patient to home.                       - Await pathology results.                       - If the pathology report reveals adenomatous                        tissue, then repeat the colonoscopy for surveillance                        in 5 years.                       - If the pathology report indicates hyperplastic                        polyp, then repeat colonoscopy for surveillance in                        6-7 years (age 59).                       - Call the G.I. clinic in 2 weeks for reports (if                        you haven't heard from us sooner) [phone].                       - High fiber diet.                       - Take a PROBIOTIC, such as a carton of GREEK YOGURT                        (Chobani or Oikos, or Activia or Dannon); or tablets                        of ALIGN or CULTURELLE or TIO-Q (all                        non-prescription),  every day for a month.  Khanh Forrester MD  9/20/2013       Office Visit     4/17/2018  [facility] [place] - OBGYN      Negra Vargas MD   Obstetrics and Gynecology   Encounter for gynecological examination without abnormal finding +6 more   Dx   Well Woman   Reason for Visit        Progress Notes     Expand All Collapse All    Chief Complaint   Patient presents with    Well Woman       History of Present Illness: Renate Vang is a 57 y.o. female that presents today 4/17/2018 for well gyn visit.    ASSESSMENT/PLAN:  Encounter for gynecological examination without abnormal finding     Osteoporosis, unspecified osteoporosis type, unspecified pathological fracture presence  -     DXA Bone Density Spine And Hip; Future; Expected date: 04/17/2018     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)--2012     Postmenopausal atrophic vaginitis  -     estradiol (YUVAFEM) 10 mcg Tab; INSERT 1 TABLET VAGINALLY 2TIMES A WEEK  Dispense: 24 tablet; Refill: 3  -     medroxyPROGESTERone (PROVERA) 2.5 MG tablet; Take 1 tablet (2.5 mg total) by mouth once daily.  Dispense: 90 tablet; Refill: 3     Colon cancer screening  -     Case request GI: COLONOSCOPY     Other orders  -     ibandronate (BONIVA) 150 mg tablet; Take 1 tablet (150 mg total) by mouth every 30 days. Fasting, stay upright and fasting for 20 minutes  Dispense: 3 tablet; Refill: 3         Patient was counseled today on Pap guidelines, recommendation for pelvic exams, mammograms every other year after the age of 40 and annually after the age of 50, Colonoscopy after the age of 50, Dexa Bone Scan and calcium and vitamin D supplementation in menopause and to see her PCP for other health maintenance.   FOLLOW-UP:prn            PTA Medications   Medication Sig    calcium-vitamin D (OSCAL) 250 (625)-125 mg-unit per tablet Take 1 tablet by mouth once daily.    estradiol (YUVAFEM) 10 mcg Tab INSERT 1 TABLET VAGINALLY 2TIMES A WEEK     glucosamine-chondroitin 500-400 mg tablet Take 1 tablet by mouth 2 (two) times daily.    magnesium 30 mg Tab Take by mouth once.    medroxyPROGESTERone (PROVERA) 2.5 MG tablet Take 1 tablet (2.5 mg total) by mouth once daily.    nutritional supplement-fiber Liqd Take by mouth.    UNABLE TO FIND Take by mouth once daily. Juice Plus- veggies, fruit, and berries    zinc gluconate 50 mg tablet Take 50 mg by mouth once daily.    alprazolam (XANAX) 0.25 MG tablet Take 1 tablet (0.25 mg total) by mouth as needed.    ibandronate (BONIVA) 150 mg tablet Take 1 tablet (150 mg total) by mouth every 30 days. Fasting, stay upright and fasting for 20 minutes    promethazine (PHENERGAN) 25 MG tablet Take 1 tablet (25 mg total) by mouth every 4 (four) hours.    traMADol (ULTRAM) 50 mg tablet Take 50 mg by mouth every 6 (six) hours as needed for Pain.       Review of patient's allergies indicates:   Allergen Reactions    Latex, natural rubber         Past Medical History:   Diagnosis Date    Abnormal Pap smear of cervix     Anxiety resolved    Basal cell carcinoma 3/2011    right axilla    Breast cancer 2003    right    Depression     History of breast cancer 2003    at 42 s/p surg and radiation T1N0M0 ER+    History of HPV infection     LOW RISK    History of parathyroidectomy     R lower gland removed due to adenoma    Kidney stones     Osteoporosis 2016    PONV (postoperative nausea and vomiting)     Skin cancer     BCC     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)      Past Surgical History:   Procedure Laterality Date    BREAST LUMPECTOMY  2003    x2 Right with senital node bx    BREAST RECONSTRUCTION      s/p lumpectomy to replace previous implants bilaterally    BREAST SURGERY      breast augmentation prior to lumpectomy     SECTION, LOW TRANSVERSE      x2    COLONOSCOPY N/A 2013    [facility]    COLONOSCOPY W/ POLYPECTOMY   "09/20/2013    HERBIE.   One 1 mm polyp at the hepatic flexure. TUBULAR ADENOMATOUS POLYP.  One 1 mm polyp in the cecum. TUBULAR ADENOMATOUS POLYP.  The examination was otherwise normal.      HERNIA REPAIR      bilateral groin     LASER ABLATION OF CONDYLOMAS  2012    MALIGNANT SKIN LESION EXCISION      nose & under right arm    PARATHYROID GLAND SURGERY      R lower gland removed due to adenoma    TONSILLECTOMY       Family History   Problem Relation Age of Onset    Glaucoma Mother     Osteoarthritis Mother     Arrhythmia Mother     Fibromyalgia Mother     Kidney disease Mother     Cancer Maternal Uncle         colon    Hypertension Father     Hashimoto's thyroiditis Sister     Anxiety disorder Sister     Heart disease Maternal Grandmother     COPD Maternal Grandmother     Tuberculosis Maternal Grandmother     Hypertension Maternal Grandmother     COPD Maternal Grandfather     Osteoarthritis Maternal Grandfather     COPD Paternal Grandfather     Breast cancer Cousin     Breast cancer Maternal Aunt     Ovarian cancer Neg Hx      Social History     Tobacco Use    Smoking status: Never Smoker    Smokeless tobacco: Never Used   Substance Use Topics    Alcohol use: Yes     Alcohol/week: 2.4 oz     Types: 4 Glasses of wine per week     Comment: 2 drinks three times per week    Drug use: Yes     Types: Benzodiazepines         OBJECTIVE:     Vital Signs (Most Recent)  Temp: 98.6 °F (37 °C) (11/06/18 0807)  Pulse: 71 (11/06/18 0807)  Resp: 15 (11/06/18 0807)  BP: (!) 104/58 (11/06/18 0807)  SpO2: 98 % (11/06/18 0807)    Physical Exam:  : Ht 5' 2" (1.575 m)   Wt 64.6 kg (142 lb 6.7 oz)   BMI 26.05 kg/m²                              GENERAL:  Comfortable, in no acute distress.                                 HEENT EXAM:  Nonicteric.  No adenopathy.  Oropharynx is clear.               NECK:  Supple.                                                               LUNGS:  Clear.                            "                                    CARDIAC:  Regular rate and rhythm.  S1, S2.  No murmur.                      ABDOMEN:  Soft, positive bowel sounds, nontender.  No hepatosplenomegaly or masses.  No rebound or guarding.                                             EXTREMITIES:  No edema.     MENTAL STATUS:  Alert and oriented.    ASSESSMENT/PLAN:     Assessment: Colorectal cancer screening    Plan: Colonoscopy    Anesthesia Plan:   MAC / General Anaesthesia    ASA Grade: ASA 2 - Patient with mild systemic disease with no functional limitations    MALLAMPATI SCORE: I (soft palate, uvula, fauces, and tonsillar pillars visible)

## 2018-11-06 NOTE — TRANSFER OF CARE
"Anesthesia Transfer of Care Note    Patient: Renate Vang    Procedure(s) Performed: Procedure(s) (LRB):  COLONOSCOPY (N/A)    Patient location: PACU    Anesthesia Type: general    Transport from OR: Transported from OR on room air with adequate spontaneous ventilation    Post pain: adequate analgesia    Post assessment: no apparent anesthetic complications    Post vital signs: stable    Level of consciousness: sedated    Nausea/Vomiting: no nausea/vomiting    Complications: none    Transfer of care protocol was followed      Last vitals:   Visit Vitals  BP (!) 104/58 (BP Location: Left arm, Patient Position: Lying)   Pulse 71   Temp 37 °C (98.6 °F) (Skin)   Resp 15   Ht 5' 3" (1.6 m)   Wt 63 kg (139 lb)   LMP 09/20/2006   SpO2 98%   Breastfeeding? No   BMI 24.62 kg/m²     "

## 2018-11-06 NOTE — DISCHARGE INSTRUCTIONS
Recovery After Procedural Sedation (Adult)  You have been given medicine by vein to make you sleep during your surgery. This may have included both a pain medicine and sleeping medicine. Most of the effects have worn off. But you may still have some drowsiness for the next 6 to 8 hours.  Home care  Follow these guidelines when you get home:  · For the next 8 hours, you should be watched by a responsible adult. This person should make sure your condition is not getting worse.  · Don't drink any alcohol for the next 24 hours.  · Don't drive, operate dangerous machinery, or make important business or personal decisions during the next 24 hours.  Note: Your healthcare provider may tell you not to take any medicine by mouth for pain or sleep in the next 4 hours. These medicines may react with the medicines you were given in the hospital. This could cause a much stronger response than usual.  Follow-up care  Follow up with your healthcare provider if you are not alert and back to your usual level of activity within 12 hours.  When to seek medical advice  Call your healthcare provider right away if any of these occur:  · Drowsiness gets worse  · Weakness or dizziness gets worse  · Repeated vomiting  · You can't be awakened   Date Last Reviewed: 10/18/2016  © 4124-6140 [employer]. [address] [place] [address], [place] [address]. All rights reserved. This information is not intended as a substitute for professional medical care. Always follow your healthcare professional's instructions.      PROBIOTICS:  Now that your colon is so cleaned out, now is a good time for a round of PROBIOTICS.  Eat a container of Greek Yogurt, such as OIKOS or CHOBANI,  Or Activia or Dannon    Greek Yogurt.    Or Take a similar Probiotic product such as Align or Culturelle or Amber-Q, every day for a month.                  (The products listed are non-prescription, but you may need to ask the pharmacist for their location.)  Repeat  this 3-4 times a year.        High-Fiber Diet  Fiber is in fruits, vegetables, cereals, and grains. Fiber passes through your body undigested. A high-fiber diet helps food move through your intestinal tract. The added bulk is helpful in preventing constipation. In people with diverticulosis, fiber helps clean out the pouches along the colon wall. It also prevents new pouches from forming. A high-fiber diet reduces the risk of colon cancer. It also lowers blood cholesterol and prevents high blood sugar in people with diabetes.    The fiber-rich foods listed below should be part of your diet. If you are not used to high-fiber foods, start with 1 or 2 foods from this list. Every 3 to 4 days add a new one to your diet. Do this until you are eating 4 high-fiber foods per day. This should give you 20 to 35 grams of fiber a day. It is also important to drink a lot of water when you are on this diet. You should have 6 to 8 glasses of water a day. Water makes the fiber swell and increases the benefit.  Foods high in dietary fiber  The following foods are high in dietary fiber:  · Breads. Breads made with 100% whole-wheat flour; suzanna, wheat, or rye crackers; whole-grain tortillas, bran muffins.  · Cereals. Whole-grain and bran cereals with bran (shredded wheat, wheat flakes, raisin bran, corn bran); oatmeal, rolled oats, granola, and brown rice.  · Fruits. Fresh fruits and their edible skins (pears, prunes, raisins, berries, apples, and apricots); bananas, citrus fruit, mangoes, pineapple; and prune juice.  · Nuts. Any nuts and seeds.  · Vegetables. Best served raw or lightly cooked. All types, especially: green peas, celery, eggplant, potatoes, spinach, broccoli, [place] sprouts, winter squash, carrots, cauliflower, soybeans, lentils, and fresh and dried beans of all kinds.  · Other. Popcorn, any spices.  Date Last Reviewed: 8/1/2016  © 2128-0695 [employer]. [address] [place] [address], [place] [address]. All  rights reserved. This information is not intended as a substitute for professional medical care. Always follow your healthcare professional's instructions.

## 2018-11-06 NOTE — PLAN OF CARE
PIV removed.  Pressure dressing applied.  Discharge instructions reviewed with patient.  All questions answered.  D/C via wheelchair to personal vehicle.

## 2018-11-06 NOTE — ANESTHESIA POSTPROCEDURE EVALUATION
"Anesthesia Post Evaluation    Patient: Renate Vang    Procedure(s) Performed: Procedure(s) (LRB):  COLONOSCOPY (N/A)    Final Anesthesia Type: general  Patient location during evaluation: PACU  Patient participation: Yes- Able to Participate  Level of consciousness: awake and alert  Post-procedure vital signs: reviewed and stable  Pain management: adequate  Airway patency: patent  PONV status at discharge: No PONV  Anesthetic complications: no      Cardiovascular status: blood pressure returned to baseline  Respiratory status: unassisted  Hydration status: euvolemic  Follow-up not needed.        Visit Vitals  /66   Pulse 72   Temp 36.3 °C (97.3 °F) (Skin)   Resp 14   Ht 5' 3" (1.6 m)   Wt 63 kg (139 lb)   LMP 09/20/2006   SpO2 99%   Breastfeeding? No   BMI 24.62 kg/m²       Pain/Jose Score: Pain Assessment Performed: Yes (11/6/2018  8:56 AM)  Presence of Pain: denies (11/6/2018  8:56 AM)        "

## 2018-11-06 NOTE — PROVATION PATIENT INSTRUCTIONS
Discharge Summary/Instructions for after Colonoscopy with   Biopsy/Polypectomy  Renate Vang    Tuesday, November 06, 2018  Khanh Forrester MD  RESTRICTIONS ON ACTIVITY:  - Do not drive a car or operate machinery until the day after the procedure.      - The following day: return to full activity including work.  - For  3 days: No heavy lifting, straining or running.  - Diet: You can have solid foods, but no gassy foods (i.e. beans, broccoli,   cabbage, etc).  TREATMENT FOR COMMON SIDE EFFECTS:  - Mild abdominal pain and bloating or excessive gas: rest, eat lightly and   use a heating pad.  SYMPTOMS TO WATCH FOR AND REPORT TO YOUR PHYSICIAN:  1. Severe abdominal pain.  2. Fever within 24 hours after a procedure.  3. A large amount of rectal bleeding. (A small amount of blood from the   rectum is not serious, especially if hemorrhoids are present.  3.  Because air was put into your colon during the procedure, expelling   large amounts of air from your rectum is normal.  4.  You may not have a bowel movement for 1-3 days because of the   colonoscopy prep.  This is normal.  5.  Call immediately if you notice any of the following:   Chills and/or fever over 101   Persistent vomiting   Severe abdominal pain, other than gas cramps   Severe chest pain   Black, tarry stools   Any bleeding - exceeding one tablespoon  Your doctor recommends these additional instructions:  We are waiting for your pathology results.   If the pathology report reveals adenomatous (precancerous) tissue, then your   physician recommends a repeat colonoscopy for surveillance in 5 years.   If the pathology report indicates a hyperplastic polyp, then the colonoscopy   will be repeated for surveillance in 7-8 years.   Eat a high fiber diet.   Take a PROBIOTIC, such as a carton of GREEK YOGURT (Chobani or Oikos,  or   Activia or Dannon);  or tablets of ALIGN or CULTURELLE or TIO-Q (all   non-prescription), every day for a month.   And repeat this  Duration Of Freeze Thaw-Cycle (Seconds): 5 at least 3-4   times a year.  Call the G.I. clinic in 2 weeks for reports (if you haven't heard from us   sooner)  [phone].  None  If you have any questions or problems, please call your physician.  EMERGENCY PHONE NUMBER: [phone]  LAB RESULTS: Call in two (2) weeks for lab results, [phone]  ___________________________________________  Nurse Signature  ___________________________________________  Patient/Designated Responsible Party Signature  Khanh Forrester MD  11/6/2018 9:06:07 AM  This report has been verified and signed electronically.  [employer]ATION   Detail Level: Zone Render Post-Care Instructions In Note?: no Post-Care Instructions: I reviewed with the patient in detail post-care instructions. Patient is to wear sunprotection, and avoid picking at any of the treated lesions. Pt may apply Vaseline to crusted or scabbing areas. Consent: The patient's consent was obtained including but not limited to risks of crusting, scabbing, blistering, scarring, darker or lighter pigmentary change, recurrence, incomplete removal and infection.

## 2018-11-06 NOTE — ANESTHESIA PREPROCEDURE EVALUATION
11/06/2018  Renate Vang is a 58 y.o., female.    Pre-op Assessment         Review of Systems  Anesthesia Hx:  No problems with previous Anesthesia Hx of Anesthetic complications (PONV)    Social:  Non-Smoker, No Alcohol Use    Hematology/Oncology:  Hematology Normal        Cardiovascular:  Cardiovascular Normal     Pulmonary:  Pulmonary Normal    Renal/:  Renal/ Normal     Hepatic/GI:  Hepatic/GI Normal    Neurological:  Neurology Normal    Endocrine:  Endocrine Normal        Physical Exam  General:  Well nourished    Airway/Jaw/Neck:  Airway Findings: Mouth Opening: Normal Tongue: Normal  General Airway Assessment: Adult, Average  Mallampati: II  Jaw/Neck Findings:  Neck ROM: Normal ROM       Chest/Lungs:  Chest/Lungs Findings: Clear to auscultation, Normal Respiratory Rate     Heart/Vascular:  Heart Findings: Rate: Normal  Rhythm: Regular Rhythm  Sounds: Normal  Heart murmur: negative       Mental Status:  Mental Status Findings:  Cooperative, Alert and Oriented         Anesthesia Plan  Type of Anesthesia, risks & benefits discussed:  Anesthesia Type:  general  Patient's Preference:   Intra-op Monitoring Plan: standard ASA monitors  Intra-op Monitoring Plan Comments:   Post Op Pain Control Plan:   Post Op Pain Control Plan Comments:   Induction:    Beta Blocker:  Patient is not currently on a Beta-Blocker (No further documentation required).       Informed Consent: Patient understands risks and agrees with Anesthesia plan.  Questions answered. Anesthesia consent signed with patient.  ASA Score: 2     Day of Surgery Review of History & Physical:    H&P update referred to the surgeon.         Ready For Surgery From Anesthesia Perspective.

## 2018-11-06 NOTE — BRIEF OP NOTE
Discharge Note  Short Stay      SUMMARY     Admit Date: 11/6/2018    Attending Physician: Khanh Forrester Jr., MD     Discharge Physician: Khanh Forrester Jr., MD    Discharge Date: 11/6/2018 9:10 AM    Final Diagnosis: Colon cancer screening [Z12.11]  Impression:          - Two 3 to 4 mm polyps at the hepatic flexure,                        removed with a cold snare. Resected and retrieved.                       - Non-bleeding internal hemorrhoids.                       - The examination was otherwise normal.                       - The examined portion of the ileum was normal.  Recommendation:      - Discharge patient to home.                       - Await pathology results.                       - If the pathology report reveals adenomatous                        tissue, then repeat the colonoscopy for surveillance                        in 5 years.                       - If the pathology report indicates hyperplastic                        polyp, then repeat colonoscopy for surveillance in 7                        years.                       - High fiber diet.                       - Take a PROBIOTIC, such as a carton of GREEK YOGURT                        (Chobani or Oikos, or Activia or Dannon); or tablets                        of ALIGN or CULTURELLE or TIO-Q (all                        non-prescription), every day for a month.                       - Call the [facility] in 2 weeks for reports (if                        you haven't heard from us sooner) [phone].                       - Continue present medications.                       - Patient has a contact number available for                        emergencies. The signs and symptoms of potential                        delayed complications were discussed with the                        patient. Return to normal activities tomorrow.                        Written discharge instructions were provided to the                        patient.                        - Return to normal activities tomorrow.  Khanh Forrester MD  11/6/2018   Disposition: HOME OR SELF CARE    Patient Instructions:   Current Discharge Medication List      CONTINUE these medications which have NOT CHANGED    Details   calcium-vitamin D (OSCAL) 250 (625)-125 mg-unit per tablet Take 1 tablet by mouth once daily.      estradiol (YUVAFEM) 10 mcg Tab INSERT 1 TABLET VAGINALLY 2TIMES A WEEK  Qty: 24 tablet, Refills: 3    Associated Diagnoses: Postmenopausal atrophic vaginitis      glucosamine-chondroitin 500-400 mg tablet Take 1 tablet by mouth 2 (two) times daily.      magnesium 30 mg Tab Take by mouth once.      medroxyPROGESTERone (PROVERA) 2.5 MG tablet Take 1 tablet (2.5 mg total) by mouth once daily.  Qty: 90 tablet, Refills: 3    Associated Diagnoses: Postmenopausal atrophic vaginitis      nutritional supplement-fiber Liqd Take by mouth.      UNABLE TO FIND Take by mouth once daily. Juice Plus- veggies, fruit, and berries      zinc gluconate 50 mg tablet Take 50 mg by mouth once daily.      alprazolam (XANAX) 0.25 MG tablet Take 1 tablet (0.25 mg total) by mouth as needed.  Qty: 60 tablet, Refills: 1      ibandronate (BONIVA) 150 mg tablet Take 1 tablet (150 mg total) by mouth every 30 days. Fasting, stay upright and fasting for 20 minutes  Qty: 3 tablet, Refills: 3      promethazine (PHENERGAN) 25 MG tablet Take 1 tablet (25 mg total) by mouth every 4 (four) hours.  Qty: 30 tablet, Refills: 0      traMADol (ULTRAM) 50 mg tablet Take 50 mg by mouth every 6 (six) hours as needed for Pain.             Discharge Procedure Orders (must include Diet, Follow-up, Activity)    Follow Up:  Follow up with PCP as per your routine.  Please follow a high fiber diet.  Activity as tolerated.    No driving day of procedure.    PROBIOTICS:  Now that your colon is so cleaned out, now is a good time for a round of PROBIOTICS.  Eat a container of Greek Yogurt, such as OIKOS or CHOBANI,  Or Activia or  Dannon    Greek Yogurt.    Or Take a similar Probiotic product such as Align or Culturelle or Amber-Q, every day for a month.                  (The products listed are non-prescription, but you may need to ask the pharmacist for their location.)  Repeat this 3-4 times a year.

## 2018-11-14 ENCOUNTER — PATIENT MESSAGE (OUTPATIENT)
Dept: GASTROENTEROLOGY | Facility: CLINIC | Age: 58
End: 2018-11-14

## 2018-12-17 ENCOUNTER — OFFICE VISIT (OUTPATIENT)
Dept: DERMATOLOGY | Facility: CLINIC | Age: 58
End: 2018-12-17
Payer: COMMERCIAL

## 2018-12-17 VITALS — WEIGHT: 139 LBS | HEIGHT: 63 IN | RESPIRATION RATE: 16 BRPM | BODY MASS INDEX: 24.63 KG/M2

## 2018-12-17 DIAGNOSIS — Z85.828 PERSONAL HISTORY OF MALIGNANT NEOPLASM OF SKIN: ICD-10-CM

## 2018-12-17 DIAGNOSIS — L70.0 COMEDONE: ICD-10-CM

## 2018-12-17 DIAGNOSIS — D18.00 ANGIOMA: ICD-10-CM

## 2018-12-17 DIAGNOSIS — L81.4 LENTIGINES: Primary | ICD-10-CM

## 2018-12-17 PROCEDURE — 99999 PR PBB SHADOW E&M-EST. PATIENT-LVL II: CPT | Mod: PBBFAC,,, | Performed by: DERMATOLOGY

## 2018-12-17 PROCEDURE — 3008F BODY MASS INDEX DOCD: CPT | Mod: CPTII,S$GLB,, | Performed by: DERMATOLOGY

## 2018-12-17 PROCEDURE — 99213 OFFICE O/P EST LOW 20 MIN: CPT | Mod: S$GLB,,, | Performed by: DERMATOLOGY

## 2018-12-17 NOTE — PROGRESS NOTES
Subjective:       Patient ID:  Renate Vang is a 58 y.o. female who presents for   Chief Complaint   Patient presents with    Spot     nose     Last seen 8-17-18 for ED&C   BCC chest   Lesion on nose - a little over a yr, rough, no bleeding, no prior treatments        Derm Hx: BCC on nose (Mohs) and right axilla (Dr Mcdowell )          Past Medical History:   Diagnosis Date    Abnormal Pap smear of cervix     Anxiety resolved    Basal cell carcinoma 3/2011    right axilla    Breast cancer 2003    right    Depression     History of breast cancer 2003    at 42 s/p surg and radiation T1N0M0 ER+    History of HPV infection     LOW RISK    History of parathyroidectomy     R lower gland removed due to adenoma    Kidney stones     Osteoporosis 2016    PONV (postoperative nausea and vomiting)     Skin cancer     BCC     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) 2012     Review of Systems   Skin: Positive for daily sunscreen use and activity-related sunscreen use. Negative for itching, rash, tendency to form keloidal scars and recent sunburn.   Hematologic/Lymphatic: Does not bruise/bleed easily.   Allergic/Immunologic: Positive for environmental allergies.        Objective:    Physical Exam   Constitutional: She appears well-developed and well-nourished.   HENT:   Mouth/Throat: Lips normal.    Eyes: Lids are normal.    Neurological: She is alert and oriented to person, place, and time.   Psychiatric: She has a normal mood and affect.   Skin:   Areas Examined (abnormalities noted in diagram):   Head / Face Inspection Performed  Neck Inspection Performed  Chest / Axilla Inspection Performed  Back Inspection Performed                   Diagram Legend     Erythematous scaling macule/papule c/w actinic keratosis       Vascular papule c/w angioma      Pigmented verrucoid papule/plaque c/w seborrheic keratosis      Yellow umbilicated papule c/w sebaceous hyperplasia      Irregularly shaped tan macule  c/w lentigo     1-2 mm smooth white papules consistent with Milia      Movable subcutaneous cyst with punctum c/w epidermal inclusion cyst      Subcutaneous movable cyst c/w pilar cyst      Firm pink to brown papule c/w dermatofibroma      Pedunculated fleshy papule(s) c/w skin tag(s)      Evenly pigmented macule c/w junctional nevus     Mildly variegated pigmented, slightly irregular-bordered macule c/w mildly atypical nevus      Flesh colored to evenly pigmented papule c/w intradermal nevus       Pink pearly papule/plaque c/w basal cell carcinoma      Erythematous hyperkeratotic cursted plaque c/w SCC      Surgical scar with no sign of skin cancer recurrence      Open and closed comedones      Inflammatory papules and pustules      Verrucoid papule consistent consistent with wart     Erythematous eczematous patches and plaques     Dystrophic onycholytic nail with subungual debris c/w onychomycosis     Umbilicated papule    Erythematous-base heme-crusted tan verrucoid plaque consistent with inflamed seborrheic keratosis     Erythematous Silvery Scaling Plaque c/w Psoriasis     See annotation      Assessment / Plan:        Lentigines  These are benign hyperpigmented sun induced lesions. Daily sun protection will reduce the number of new lesions    Angioma  This is a benign vascular lesion. Reassurance given. No treatment required.     Personal history of malignant neoplasm of skin  Areas of previous NMSC evaluated with no signs of recurrence.  Upper body skin examination performed today including at least 6 points as noted in physical examination. No lesions suspicious for malignancy noted.    Comedone  Reassurance  Discussed extraction           Follow-up in about 6 months (around 6/17/2019) for July 2019 for skin check.

## 2019-03-11 DIAGNOSIS — N95.2 POSTMENOPAUSAL ATROPHIC VAGINITIS: ICD-10-CM

## 2019-03-11 RX ORDER — ESTRADIOL 10 UG/1
TABLET VAGINAL
Qty: 24 TABLET | Refills: 3 | Status: SHIPPED | OUTPATIENT
Start: 2019-03-11 | End: 2019-04-18

## 2019-03-13 ENCOUNTER — HOSPITAL ENCOUNTER (OUTPATIENT)
Dept: RADIOLOGY | Facility: HOSPITAL | Age: 59
Discharge: HOME | End: 2019-03-13
Attending: OBSTETRICS & GYNECOLOGY
Payer: COMMERCIAL

## 2019-03-13 DIAGNOSIS — R92.8 ABNORMAL MAMMOGRAM OF LEFT BREAST: ICD-10-CM

## 2019-03-13 PROCEDURE — 76642 US BREAST LEFT LIMITED: ICD-10-PCS | Mod: 26,LT,, | Performed by: RADIOLOGY

## 2019-03-13 PROCEDURE — 77065 DX MAMMO INCL CAD UNI: CPT | Mod: 26,LT,, | Performed by: RADIOLOGY

## 2019-03-13 PROCEDURE — 77065 DX MAMMO INCL CAD UNI: CPT | Mod: TC,PO,LT

## 2019-03-13 PROCEDURE — 76642 ULTRASOUND BREAST LIMITED: CPT | Mod: TC,PO,LT

## 2019-03-13 PROCEDURE — 77061 BREAST TOMOSYNTHESIS UNI: CPT | Mod: 26,LT,, | Performed by: RADIOLOGY

## 2019-03-13 PROCEDURE — 76642 ULTRASOUND BREAST LIMITED: CPT | Mod: 26,LT,, | Performed by: RADIOLOGY

## 2019-03-13 PROCEDURE — 77065 MAMMO DIGITAL DIAGNOSTIC LEFT WITH TOMOSYNTHESIS_CAD: ICD-10-PCS | Mod: 26,LT,, | Performed by: RADIOLOGY

## 2019-03-13 PROCEDURE — 77061 MAMMO DIGITAL DIAGNOSTIC LEFT WITH TOMOSYNTHESIS_CAD: ICD-10-PCS | Mod: 26,LT,, | Performed by: RADIOLOGY

## 2019-04-02 DIAGNOSIS — R92.8 ABNORMAL MAMMOGRAM: ICD-10-CM

## 2019-04-02 DIAGNOSIS — R92.8 FOLLOW-UP EXAMINATION OF ABNORMAL MAMMOGRAM: Primary | ICD-10-CM

## 2019-04-18 ENCOUNTER — OFFICE VISIT (OUTPATIENT)
Dept: OBSTETRICS AND GYNECOLOGY | Facility: CLINIC | Age: 59
End: 2019-04-18
Payer: COMMERCIAL

## 2019-04-18 VITALS
HEIGHT: 63 IN | SYSTOLIC BLOOD PRESSURE: 100 MMHG | DIASTOLIC BLOOD PRESSURE: 62 MMHG | WEIGHT: 145.75 LBS | BODY MASS INDEX: 25.82 KG/M2

## 2019-04-18 DIAGNOSIS — N89.0 VAIN I (VAGINAL INTRAEPITHELIAL NEOPLASIA GRADE I): ICD-10-CM

## 2019-04-18 DIAGNOSIS — Z01.419 GYNECOLOGIC EXAM NORMAL: Primary | ICD-10-CM

## 2019-04-18 DIAGNOSIS — N95.2 POSTMENOPAUSAL ATROPHIC VAGINITIS: ICD-10-CM

## 2019-04-18 DIAGNOSIS — M81.0 OSTEOPOROSIS, UNSPECIFIED OSTEOPOROSIS TYPE, UNSPECIFIED PATHOLOGICAL FRACTURE PRESENCE: ICD-10-CM

## 2019-04-18 DIAGNOSIS — N89.8 VAGINAL DRYNESS: ICD-10-CM

## 2019-04-18 DIAGNOSIS — R10.32 LEFT LOWER QUADRANT PAIN: ICD-10-CM

## 2019-04-18 PROCEDURE — 87624 HPV HI-RISK TYP POOLED RSLT: CPT

## 2019-04-18 PROCEDURE — 99999 PR PBB SHADOW E&M-EST. PATIENT-LVL III: CPT | Mod: PBBFAC,,, | Performed by: OBSTETRICS & GYNECOLOGY

## 2019-04-18 PROCEDURE — 88175 CYTOPATH C/V AUTO FLUID REDO: CPT

## 2019-04-18 PROCEDURE — 99396 PR PREVENTIVE VISIT,EST,40-64: ICD-10-PCS | Mod: S$GLB,,, | Performed by: OBSTETRICS & GYNECOLOGY

## 2019-04-18 PROCEDURE — 99999 PR PBB SHADOW E&M-EST. PATIENT-LVL III: ICD-10-PCS | Mod: PBBFAC,,, | Performed by: OBSTETRICS & GYNECOLOGY

## 2019-04-18 PROCEDURE — 99396 PREV VISIT EST AGE 40-64: CPT | Mod: S$GLB,,, | Performed by: OBSTETRICS & GYNECOLOGY

## 2019-04-18 RX ORDER — PSEUDOEPHEDRINE HCL 120 MG/1
120 TABLET, FILM COATED, EXTENDED RELEASE ORAL
COMMUNITY
End: 2020-02-21

## 2019-04-18 RX ORDER — ESTRADIOL 10 UG/1
INSERT VAGINAL
Qty: 24 TABLET | Refills: 3 | Status: SHIPPED | OUTPATIENT
Start: 2019-04-18 | End: 2019-04-30 | Stop reason: SDUPTHER

## 2019-04-18 RX ORDER — LORATADINE 10 MG/1
10 TABLET ORAL DAILY
COMMUNITY
End: 2020-02-21

## 2019-04-18 NOTE — PROGRESS NOTES
Chief Complaint   Patient presents with    Well Woman    mmg up to date    random pain in abdomen       History of Present Illness: Renate Vang is a 58 y.o. female that presents today 2019 for well gyn visit.    Past Medical History:   Diagnosis Date    Abnormal Pap smear of cervix     Anxiety resolved    Basal cell carcinoma 3/2011    right axilla    Breast cancer 2003    right    Depression     History of breast cancer 2003    at 42 s/p surg and radiation T1N0M0 ER+    History of HPV infection     LOW RISK    History of parathyroidectomy     R lower gland removed due to adenoma    Kidney stones     Osteoporosis     PONV (postoperative nausea and vomiting)     Skin cancer     BCC     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)        Past Surgical History:   Procedure Laterality Date    BREAST LUMPECTOMY  2003    x2 Right with senital node bx, with radiation    BREAST RECONSTRUCTION      s/p lumpectomy to replace previous implants bilaterally    BREAST SURGERY      breast augmentation prior to lumpectomy     SECTION, LOW TRANSVERSE      x2    COLONOSCOPY N/A 2018    [facility]    COLONOSCOPY N/A 2013    [facility]    COLONOSCOPY W/ POLYPECTOMY  2013    HERBIE.   One 1 mm polyp at the hepatic flexure. TUBULAR ADENOMATOUS POLYP.  One 1 mm polyp in the cecum. TUBULAR ADENOMATOUS POLYP.  The examination was otherwise normal.      HERNIA REPAIR      bilateral groin     LASER ABLATION OF CONDYLOMAS      MALIGNANT SKIN LESION EXCISION      nose & under right arm    PARATHYROID GLAND SURGERY      R lower gland removed due to adenoma    TONSILLECTOMY         Current Outpatient Medications   Medication Sig Dispense Refill    alprazolam (XANAX) 0.25 MG tablet Take 1 tablet (0.25 mg total) by mouth as needed. 60 tablet 1    calcium-vitamin D (OSCAL) 250 625)-125  mg-unit per tablet Take 1 tablet by mouth once daily.      estradiol (YUVAFEM) 10 mcg Tab INSERT 1 TABLET VAGINALLY 2TIMES A WEEK 24 tablet 3    glucosamine-chondroitin 500-400 mg tablet Take 1 tablet by mouth 2 (two) times daily.      loratadine (CLARITIN) 10 mg tablet Take 10 mg by mouth once daily.      magnesium 30 mg Tab Take by mouth once.      medroxyPROGESTERone (PROVERA) 2.5 MG tablet Take 1 tablet (2.5 mg total) by mouth once daily. 90 tablet 3    pseudoephedrine (SUDAFED) 120 mg 12 hr tablet Take 120 mg by mouth every 12 (twelve) hours.      zinc gluconate 50 mg tablet Take 50 mg by mouth once daily.      promethazine (PHENERGAN) 25 MG tablet Take 1 tablet (25 mg total) by mouth every 4 (four) hours. 30 tablet 0    traMADol (ULTRAM) 50 mg tablet Take 50 mg by mouth every 6 (six) hours as needed for Pain.       No current facility-administered medications for this visit.        Review of patient's allergies indicates:   Allergen Reactions    Adhesive Rash     Blisters with band-aids and surgical dressing       Family History   Problem Relation Age of Onset    Glaucoma Mother     Osteoarthritis Mother     Arrhythmia Mother     Fibromyalgia Mother     Kidney disease Mother     Cancer Maternal Uncle         colon    Hypertension Father     Hashimoto's thyroiditis Sister     Anxiety disorder Sister     Heart disease Maternal Grandmother     COPD Maternal Grandmother     Tuberculosis Maternal Grandmother     Hypertension Maternal Grandmother     COPD Maternal Grandfather     Osteoarthritis Maternal Grandfather     COPD Paternal Grandfather     Breast cancer Cousin     Breast cancer Maternal Aunt     Ovarian cancer Neg Hx        Social History     Socioeconomic History    Marital status:      Spouse name: Not on file    Number of children: Not on file    Years of education: Not on file    Highest education level: Not on file   Occupational History     Employer: [employer]   Social Needs    Financial resource strain: Not on file    Food insecurity:     Worry: Not on file     Inability: Not on file    Transportation needs:     Medical: Not on file     Non-medical: Not on file   Tobacco Use    Smoking status: Never Smoker    Smokeless tobacco: Never Used   Substance and Sexual Activity    Alcohol use: Yes     Alcohol/week: 2.4 oz     Types: 4 Glasses of wine per week     Comment: 2 drinks three times per week    Drug use: Yes     Types: Benzodiazepines    Sexual activity: Yes     Partners: Male     Birth control/protection: Post-menopausal   Lifestyle    Physical activity:     Days per week: Not on file     Minutes per session: Not on file    Stress: Not on file   Relationships    Social connections:     Talks on phone: Not on file     Gets together: Not on file     Attends [employer] service: Not on file     Active member of club or organization: Not on file     Attends meetings of clubs or organizations: Not on file     Relationship status: Not on file   Other Topics Concern    Are you pregnant or think you may be? No    Breast-feeding No   Social History Narrative    Not on file       OB History    Para Term  AB Living   5 4 2   1     SAB TAB Ectopic Multiple Live Births     1            # Outcome Date GA Lbr Manfred/2nd Weight Sex Delivery Anes PTL Lv   5 Term            4 Term            3 TAB            2 Para      CS-LTranv      1 Para      CS-LTranv          Review of Symptoms:  GENERAL: Denies weight gain or weight loss. Feeling well overall.   SKIN: Denies rash or lesions.   HEAD: Denies head injury or headache.   NODES: Denies enlarged lymph nodes.   CHEST: Denies chest pain or shortness of breath.   CARDIOVASCULAR: Denies palpitations or left sided chest pain.   ABDOMEN: No abdominal pain, constipation, diarrhea, nausea, vomiting or rectal bleeding.   URINARY: No frequency, dysuria, hematuria, or burning on urination.  HEMATOLOGIC: No easy  "bruisability or excessive bleeding.   MUSCULOSKELETAL: Denies joint pain or swelling.     /62   Ht 5' 3" (1.6 m)   Wt 66.1 kg (145 lb 11.6 oz)   LMP 09/20/2006   Physical Exam:  APPEARANCE: Well nourished, well developed, in no acute distress.  SKIN: Normal skin turgor, no lesions.  NECK: Neck symmetric without masses   RESPIRATORY: Normal respiratory effort with no retractions or use of accessory muscles  CARDIOVASCULAR: Peripheral vascular system with no swelling no varicosities and palpation of pulses normal  LYMPHATIC: No enlargements of the lymph nodes noted in the neck, axillae, or groin  ABDOMEN: Soft. No tenderness or masses. No hepatosplenomegaly. No hernias.  BREASTS: Symmetrical, no skin changes or visible lesions. No palpable masses, nipple discharge or adenopathy bilaterally.  PELVIC: Normal external female genitalia without lesions. Normal hair distribution. Adequate perineal body, normal urethral meatus. Urethra with no masses.  Bladder nontender. Vagina moist and well rugated without lesions or discharge. Cervix pink and without lesions. No significant cystocele or rectocele. Bimanual exam showed uterus normal size, shape, position, mobile and nontender. Adnexa without masses or tenderness. Urethra and bladder normal.   EXTREMITIES: No clubbing cyanosis or edema.    ASSESSMENT/PLAN:  Gynecologic exam normal  -     Liquid-based pap smear, screening  -     HPV High Risk Genotypes, PCR          Patient was counseled today on Pelvic exams and Pap Smear guidelines.   We discussed STD screening if at high risk for a STD.  We discussed recommendation for breast cancer screening with mammogram every other year after the age of 40 and annually after the age of 50.    We discussed colon cancer screening when indicated.   Osteoporosis screening discussed when indicated.   She was advised to see her primary care physician for all other health maintenance.     FOLLOW-UP with me for next routine visit.   "

## 2019-04-24 ENCOUNTER — HOSPITAL ENCOUNTER (OUTPATIENT)
Dept: RADIOLOGY | Facility: HOSPITAL | Age: 59
Discharge: HOME | End: 2019-04-24
Attending: OBSTETRICS & GYNECOLOGY
Payer: COMMERCIAL

## 2019-04-24 DIAGNOSIS — R10.32 LEFT LOWER QUADRANT PAIN: ICD-10-CM

## 2019-04-24 PROCEDURE — 76856 US PELVIS COMP WITH TRANSVAG NON-OB (XPD): ICD-10-PCS | Mod: 26,,, | Performed by: RADIOLOGY

## 2019-04-24 PROCEDURE — 76856 US EXAM PELVIC COMPLETE: CPT | Mod: 26,,, | Performed by: RADIOLOGY

## 2019-04-24 PROCEDURE — 76830 TRANSVAGINAL US NON-OB: CPT | Mod: TC,PN

## 2019-04-24 PROCEDURE — 76830 TRANSVAGINAL US NON-OB: CPT | Mod: 26,,, | Performed by: RADIOLOGY

## 2019-04-24 PROCEDURE — 76830 US PELVIS COMP WITH TRANSVAG NON-OB (XPD): ICD-10-PCS | Mod: 26,,, | Performed by: RADIOLOGY

## 2019-04-25 ENCOUNTER — PATIENT MESSAGE (OUTPATIENT)
Dept: OBSTETRICS AND GYNECOLOGY | Facility: CLINIC | Age: 59
End: 2019-04-25

## 2019-04-25 LAB
HPV HR 12 DNA CVX QL NAA+PROBE: NEGATIVE
HPV16 AG SPEC QL: NEGATIVE
HPV18 DNA SPEC QL NAA+PROBE: NEGATIVE

## 2019-04-30 ENCOUNTER — OFFICE VISIT (OUTPATIENT)
Dept: OBSTETRICS AND GYNECOLOGY | Facility: CLINIC | Age: 59
End: 2019-04-30
Payer: COMMERCIAL

## 2019-04-30 VITALS
WEIGHT: 149.06 LBS | DIASTOLIC BLOOD PRESSURE: 60 MMHG | HEIGHT: 63 IN | SYSTOLIC BLOOD PRESSURE: 110 MMHG | BODY MASS INDEX: 26.41 KG/M2

## 2019-04-30 DIAGNOSIS — Z85.3 HISTORY OF BREAST CANCER: ICD-10-CM

## 2019-04-30 DIAGNOSIS — R10.32 LEFT LOWER QUADRANT PAIN: ICD-10-CM

## 2019-04-30 DIAGNOSIS — N95.2 POSTMENOPAUSAL ATROPHIC VAGINITIS: ICD-10-CM

## 2019-04-30 DIAGNOSIS — N94.89 PELVIC CONGESTION: Primary | ICD-10-CM

## 2019-04-30 PROCEDURE — 3008F PR BODY MASS INDEX (BMI) DOCUMENTED: ICD-10-PCS | Mod: CPTII,S$GLB,, | Performed by: OBSTETRICS & GYNECOLOGY

## 2019-04-30 PROCEDURE — 99999 PR PBB SHADOW E&M-EST. PATIENT-LVL III: CPT | Mod: PBBFAC,,, | Performed by: OBSTETRICS & GYNECOLOGY

## 2019-04-30 PROCEDURE — 99213 OFFICE O/P EST LOW 20 MIN: CPT | Mod: S$GLB,,, | Performed by: OBSTETRICS & GYNECOLOGY

## 2019-04-30 PROCEDURE — 99213 PR OFFICE/OUTPT VISIT, EST, LEVL III, 20-29 MIN: ICD-10-PCS | Mod: S$GLB,,, | Performed by: OBSTETRICS & GYNECOLOGY

## 2019-04-30 PROCEDURE — 3008F BODY MASS INDEX DOCD: CPT | Mod: CPTII,S$GLB,, | Performed by: OBSTETRICS & GYNECOLOGY

## 2019-04-30 PROCEDURE — 99999 PR PBB SHADOW E&M-EST. PATIENT-LVL III: ICD-10-PCS | Mod: PBBFAC,,, | Performed by: OBSTETRICS & GYNECOLOGY

## 2019-04-30 RX ORDER — ESTRADIOL 10 UG/1
INSERT VAGINAL
Qty: 24 TABLET | Refills: 3 | Status: SHIPPED | OUTPATIENT
Start: 2019-04-30 | End: 2020-02-21

## 2019-04-30 RX ORDER — CHOLECALCIFEROL (VITAMIN D3) 25 MCG
600 TABLET ORAL DAILY
COMMUNITY

## 2019-04-30 RX ORDER — MEDROXYPROGESTERONE ACETATE 2.5 MG/1
2.5 TABLET ORAL DAILY
Qty: 90 TABLET | Refills: 3 | Status: SHIPPED | OUTPATIENT
Start: 2019-04-30 | End: 2020-02-21

## 2019-04-30 NOTE — PROGRESS NOTES
Chief Complaint   Patient presents with    discuss US       History of Present Illness: Renate Vang is a 58 y.o. female that presents today 2019 for   Chief Complaint   Patient presents with    discuss US         Past Medical History:   Diagnosis Date    Abnormal Pap smear of cervix     Anxiety resolved    Basal cell carcinoma 3/2011    right axilla    Breast cancer 2003    right    Depression     History of breast cancer 2003    at 42 s/p surg and radiation T1N0M0 ER+    History of HPV infection     LOW RISK    History of parathyroidectomy     R lower gland removed due to adenoma    Kidney stones     Osteoporosis     PONV (postoperative nausea and vomiting)     Skin cancer     BCC     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)        Past Surgical History:   Procedure Laterality Date    BREAST LUMPECTOMY  2003    x2 Right with senital node bx, with radiation    BREAST RECONSTRUCTION      s/p lumpectomy to replace previous implants bilaterally    BREAST SURGERY      breast augmentation prior to lumpectomy     SECTION, LOW TRANSVERSE      x2    COLONOSCOPY N/A 2018    [facility]    COLONOSCOPY N/A 2013    [facility]    COLONOSCOPY W/ POLYPECTOMY  2013    HERBIE.   One 1 mm polyp at the hepatic flexure. TUBULAR ADENOMATOUS POLYP.  One 1 mm polyp in the cecum. TUBULAR ADENOMATOUS POLYP.  The examination was otherwise normal.      HERNIA REPAIR      bilateral groin     LASER ABLATION OF CONDYLOMAS      MALIGNANT SKIN LESION EXCISION      nose & under right arm    PARATHYROID GLAND SURGERY      R lower gland removed due to adenoma    TONSILLECTOMY         Current Outpatient Medications   Medication Sig Dispense Refill    calcium-vitamin D (OSCAL) 250 (625)-125 mg-unit per tablet Take 1 tablet by mouth once daily.      estradiol (YUVAFEM) 10 mcg Tab INSERT 1  TABLET VAGINALLY 2TIMES A WEEK 24 tablet 3    glucosamine-chondroitin 500-400 mg tablet Take 1 tablet by mouth 2 (two) times daily.      loratadine (CLARITIN) 10 mg tablet Take 10 mg by mouth once daily.      magnesium 30 mg Tab Take by mouth once.      medroxyPROGESTERone (PROVERA) 2.5 MG tablet Take 1 tablet (2.5 mg total) by mouth once daily. 90 tablet 3    pseudoephedrine (SUDAFED) 120 mg 12 hr tablet Take 120 mg by mouth every 12 (twelve) hours.      vitamin D (VITAMIN D3) 1000 units Tab Take 1,000 Units by mouth once daily.      zinc gluconate 50 mg tablet Take 50 mg by mouth once daily.      alprazolam (XANAX) 0.25 MG tablet Take 1 tablet (0.25 mg total) by mouth as needed. 60 tablet 1    prasterone, dhea, (INTRAROSA) 6.5 mg Inst Place 6.5 mg vaginally every evening. 91 each 3    promethazine (PHENERGAN) 25 MG tablet Take 1 tablet (25 mg total) by mouth every 4 (four) hours. 30 tablet 0    traMADol (ULTRAM) 50 mg tablet Take 50 mg by mouth every 6 (six) hours as needed for Pain.       No current facility-administered medications for this visit.        Review of patient's allergies indicates:   Allergen Reactions    Adhesive Rash     Blisters with band-aids and surgical dressing       Family History   Problem Relation Age of Onset    Glaucoma Mother     Osteoarthritis Mother     Arrhythmia Mother     Fibromyalgia Mother     Kidney disease Mother     Cancer Maternal Uncle         colon    Hypertension Father     Hashimoto's thyroiditis Sister     Anxiety disorder Sister     Heart disease Maternal Grandmother     COPD Maternal Grandmother     Tuberculosis Maternal Grandmother     Hypertension Maternal Grandmother     COPD Maternal Grandfather     Osteoarthritis Maternal Grandfather     COPD Paternal Grandfather     Breast cancer Cousin     Breast cancer Maternal Aunt     Ovarian cancer Neg Hx        Social History     Tobacco Use    Smoking status: Never Smoker    Smokeless  "tobacco: Never Used   Substance Use Topics    Alcohol use: Yes     Alcohol/week: 2.4 oz     Types: 4 Glasses of wine per week     Comment: 2 drinks three times per week    Drug use: Yes     Types: Benzodiazepines       OB History    Para Term  AB Living   5 4 2   1     SAB TAB Ectopic Multiple Live Births     1            # Outcome Date GA Lbr Manfred/2nd Weight Sex Delivery Anes PTL Lv   5 Term            4 Term            3 TAB            2 Para      CS-LTranv      1 Para      CS-LTranv          Review of Symptoms:  GENERAL: Denies weight gain or weight loss. Feeling well overall.   SKIN: Denies rash or lesions.   HEAD: Denies head injury or headache.   NODES: Denies enlarged lymph nodes.   CHEST: Denies chest pain or shortness of breath.   CARDIOVASCULAR: Denies palpitations or left sided chest pain.   ABDOMEN: No abdominal pain, constipation, diarrhea, nausea, vomiting or rectal bleeding.   URINARY: No frequency, dysuria, hematuria, or burning on urination.  HEMATOLOGIC: No easy bruisability or excessive bleeding.   MUSCULOSKELETAL: Denies joint pain or swelling.     /60   Ht 5' 3" (1.6 m)   Wt 67.6 kg (149 lb 0.5 oz)   LMP 2006     ASSESSMENT/PLAN:  Pelvic congestion    Left lower quadrant pain    Postmenopausal atrophic vaginitis  -     medroxyPROGESTERone (PROVERA) 2.5 MG tablet; Take 1 tablet (2.5 mg total) by mouth once daily.  Dispense: 90 tablet; Refill: 3  -     estradiol (YUVAFEM) 10 mcg Tab; INSERT 1 TABLET VAGINALLY 2TIMES A WEEK  Dispense: 24 tablet; Refill: 3  -     prasterone, dhea, (INTRAROSA) 6.5 mg Inst; Place 6.5 mg vaginally every evening.  Dispense: 91 each; Refill: 3    History of breast cancer          We discussed her PCS and her questions were answered to her satisfaction    15 minutes spent today with this patient. Greater than half spent in counseling today.     "

## 2019-10-23 ENCOUNTER — HOSPITAL ENCOUNTER (OUTPATIENT)
Dept: RADIOLOGY | Facility: HOSPITAL | Age: 59
Discharge: HOME | End: 2019-10-23
Attending: OBSTETRICS & GYNECOLOGY
Payer: COMMERCIAL

## 2019-10-23 DIAGNOSIS — R92.8 ABNORMAL MAMMOGRAM: ICD-10-CM

## 2019-10-23 DIAGNOSIS — R92.8 FOLLOW-UP EXAMINATION OF ABNORMAL MAMMOGRAM: ICD-10-CM

## 2019-10-23 DIAGNOSIS — R92.8 ABNORMAL MAMMOGRAM: Primary | ICD-10-CM

## 2019-10-23 PROCEDURE — 77062 MAMMO DIGITAL DIAGNOSTIC BILAT WITH TOMOSYNTHESIS_CAD: ICD-10-PCS | Mod: 26,,, | Performed by: RADIOLOGY

## 2019-10-23 PROCEDURE — 77066 DX MAMMO INCL CAD BI: CPT | Mod: TC,PO

## 2019-10-23 PROCEDURE — 77066 DX MAMMO INCL CAD BI: CPT | Mod: 26,,, | Performed by: RADIOLOGY

## 2019-10-23 PROCEDURE — 76642 US BREAST BILATERAL LIMITED: ICD-10-PCS | Mod: 26,RT,, | Performed by: RADIOLOGY

## 2019-10-23 PROCEDURE — 76642 ULTRASOUND BREAST LIMITED: CPT | Mod: 26,RT,, | Performed by: RADIOLOGY

## 2019-10-23 PROCEDURE — 76642 ULTRASOUND BREAST LIMITED: CPT | Mod: 26,LT,, | Performed by: RADIOLOGY

## 2019-10-23 PROCEDURE — 77062 BREAST TOMOSYNTHESIS BI: CPT | Mod: 26,,, | Performed by: RADIOLOGY

## 2019-10-23 PROCEDURE — 76642 ULTRASOUND BREAST LIMITED: CPT | Mod: TC,50,PO

## 2019-10-23 PROCEDURE — 77066 MAMMO DIGITAL DIAGNOSTIC BILAT WITH TOMOSYNTHESIS_CAD: ICD-10-PCS | Mod: 26,,, | Performed by: RADIOLOGY

## 2019-11-23 DIAGNOSIS — N95.2 POSTMENOPAUSAL ATROPHIC VAGINITIS: ICD-10-CM

## 2019-11-25 RX ORDER — PRASTERONE 6.5 MG/1
INSERT VAGINAL
Qty: 84 EACH | Refills: 1 | Status: SHIPPED | OUTPATIENT
Start: 2019-11-25 | End: 2020-02-21

## 2020-01-21 ENCOUNTER — TELEPHONE (OUTPATIENT)
Dept: FAMILY MEDICINE | Facility: CLINIC | Age: 60
End: 2020-01-21

## 2020-01-21 NOTE — TELEPHONE ENCOUNTER
----- Message from Dayron Byrne sent at 1/21/2020 11:09 AM CST -----  Contact: pt   Type:  Sooner Apoointment Request    Caller is requesting a sooner appointment.  Caller declined first available appointment listed below.  Caller will not accept being placed on the waitlist and is requesting a message be sent to doctor.    Name of Caller:  Pt   When is the first available appointment?  03/05/2020   Symptoms:    Best Call Back Number:    Additional Information:  Pt called wanted a wellness check/ annual wanted something sooner if possible

## 2020-02-21 ENCOUNTER — OFFICE VISIT (OUTPATIENT)
Dept: FAMILY MEDICINE | Facility: CLINIC | Age: 60
End: 2020-02-21
Payer: COMMERCIAL

## 2020-02-21 VITALS
TEMPERATURE: 98 F | SYSTOLIC BLOOD PRESSURE: 116 MMHG | RESPIRATION RATE: 18 BRPM | BODY MASS INDEX: 23.86 KG/M2 | DIASTOLIC BLOOD PRESSURE: 72 MMHG | HEART RATE: 83 BPM | HEIGHT: 63 IN | OXYGEN SATURATION: 99 % | WEIGHT: 134.69 LBS

## 2020-02-21 DIAGNOSIS — Z00.00 ANNUAL PHYSICAL EXAM: Primary | ICD-10-CM

## 2020-02-21 LAB
ALBUMIN SERPL BCP-MCNC: 4.3 G/DL (ref 3.5–5.2)
ALP SERPL-CCNC: 64 U/L (ref 55–135)
ALT SERPL W/O P-5'-P-CCNC: 19 U/L (ref 10–44)
ANION GAP SERPL CALC-SCNC: 9 MMOL/L (ref 8–16)
AST SERPL-CCNC: 23 U/L (ref 10–40)
BASOPHILS # BLD AUTO: 0.04 K/UL (ref 0–0.2)
BASOPHILS NFR BLD: 0.8 % (ref 0–1.9)
BILIRUB SERPL-MCNC: 0.5 MG/DL (ref 0.1–1)
BUN SERPL-MCNC: 12 MG/DL (ref 6–20)
CALCIUM SERPL-MCNC: 9.2 MG/DL (ref 8.7–10.5)
CHLORIDE SERPL-SCNC: 104 MMOL/L (ref 95–110)
CHOLEST SERPL-MCNC: 241 MG/DL (ref 120–199)
CHOLEST/HDLC SERPL: 3.1 {RATIO} (ref 2–5)
CO2 SERPL-SCNC: 28 MMOL/L (ref 23–29)
CREAT SERPL-MCNC: 0.8 MG/DL (ref 0.5–1.4)
DIFFERENTIAL METHOD: ABNORMAL
EOSINOPHIL # BLD AUTO: 0.1 K/UL (ref 0–0.5)
EOSINOPHIL NFR BLD: 2.4 % (ref 0–8)
ERYTHROCYTE [DISTWIDTH] IN BLOOD BY AUTOMATED COUNT: 13 % (ref 11.5–14.5)
EST. GFR  (AFRICAN AMERICAN): >60 ML/MIN/1.73 M^2
EST. GFR  (NON AFRICAN AMERICAN): >60 ML/MIN/1.73 M^2
GLUCOSE SERPL-MCNC: 79 MG/DL (ref 70–110)
HCT VFR BLD AUTO: 44.8 % (ref 37–48.5)
HDLC SERPL-MCNC: 79 MG/DL (ref 40–75)
HDLC SERPL: 32.8 % (ref 20–50)
HGB BLD-MCNC: 14.1 G/DL (ref 12–16)
IMM GRANULOCYTES # BLD AUTO: 0.01 K/UL (ref 0–0.04)
IMM GRANULOCYTES NFR BLD AUTO: 0.2 % (ref 0–0.5)
LDLC SERPL CALC-MCNC: 146.8 MG/DL (ref 63–159)
LYMPHOCYTES # BLD AUTO: 1.9 K/UL (ref 1–4.8)
LYMPHOCYTES NFR BLD: 36.7 % (ref 18–48)
MCH RBC QN AUTO: 29.7 PG (ref 27–31)
MCHC RBC AUTO-ENTMCNC: 31.5 G/DL (ref 32–36)
MCV RBC AUTO: 95 FL (ref 82–98)
MONOCYTES # BLD AUTO: 0.4 K/UL (ref 0.3–1)
MONOCYTES NFR BLD: 7.1 % (ref 4–15)
NEUTROPHILS # BLD AUTO: 2.7 K/UL (ref 1.8–7.7)
NEUTROPHILS NFR BLD: 52.8 % (ref 38–73)
NONHDLC SERPL-MCNC: 162 MG/DL
NRBC BLD-RTO: 0 /100 WBC
PLATELET # BLD AUTO: 275 K/UL (ref 150–350)
PMV BLD AUTO: 10.2 FL (ref 9.2–12.9)
POTASSIUM SERPL-SCNC: 4.1 MMOL/L (ref 3.5–5.1)
PROT SERPL-MCNC: 7.4 G/DL (ref 6–8.4)
RBC # BLD AUTO: 4.74 M/UL (ref 4–5.4)
SODIUM SERPL-SCNC: 141 MMOL/L (ref 136–145)
TRIGL SERPL-MCNC: 76 MG/DL (ref 30–150)
TSH SERPL DL<=0.005 MIU/L-ACNC: 1.19 UIU/ML (ref 0.4–4)
WBC # BLD AUTO: 5.09 K/UL (ref 3.9–12.7)

## 2020-02-21 PROCEDURE — 82306 VITAMIN D 25 HYDROXY: CPT

## 2020-02-21 PROCEDURE — 80061 LIPID PANEL: CPT

## 2020-02-21 PROCEDURE — 99396 PR PREVENTIVE VISIT,EST,40-64: ICD-10-PCS | Mod: S$GLB,,, | Performed by: FAMILY MEDICINE

## 2020-02-21 PROCEDURE — 36415 COLL VENOUS BLD VENIPUNCTURE: CPT | Mod: S$GLB,,, | Performed by: FAMILY MEDICINE

## 2020-02-21 PROCEDURE — 36415 PR COLLECTION VENOUS BLOOD,VENIPUNCTURE: ICD-10-PCS | Mod: S$GLB,,, | Performed by: FAMILY MEDICINE

## 2020-02-21 PROCEDURE — 99396 PREV VISIT EST AGE 40-64: CPT | Mod: S$GLB,,, | Performed by: FAMILY MEDICINE

## 2020-02-21 PROCEDURE — 85025 COMPLETE CBC W/AUTO DIFF WBC: CPT

## 2020-02-21 PROCEDURE — 84443 ASSAY THYROID STIM HORMONE: CPT

## 2020-02-21 PROCEDURE — 80053 COMPREHEN METABOLIC PANEL: CPT

## 2020-02-21 RX ORDER — CYCLOSPORINE 0.5 MG/ML
EMULSION OPHTHALMIC
COMMUNITY
Start: 2020-02-20

## 2020-02-21 RX ORDER — PROMETHAZINE HYDROCHLORIDE 25 MG/1
25 TABLET ORAL EVERY 4 HOURS
Qty: 30 TABLET | Refills: 0 | Status: SHIPPED | OUTPATIENT
Start: 2020-02-21 | End: 2020-07-27 | Stop reason: SDUPTHER

## 2020-02-22 LAB — 25(OH)D3+25(OH)D2 SERPL-MCNC: 67 NG/ML (ref 30–96)

## 2020-02-24 NOTE — PROGRESS NOTES
Subjective:       Patient ID: Renate Vang is a 59 y.o. female.    Chief Complaint: Annual Exam (Physical)    HPI   The patient is a 59-year-old who is here today for her annual exam.  I have not seen her in quite some time.  She has been doing well.  She is staying active.  She is consuming healthy diet.  She would be willing to have fasting labs.  Her immunizations are up-to-date except for her shingles shot which she would be willing to get the pharmacy.  She does see her gynecologist regularly.  She does keep up-to-date with her breast imaging.  She is not having additional DEXA scans as she is not interested in treatment for osteopenia/osteoporosis    She does request a refill of Ultram and Phenergan.  She uses this sparingly for when she has kidney stones.  She has had intermittent kidney stone since 1995.   when she has her kidney stones, she will developed left flank pain and nausea.  She still has Ultram and Phenergan leftover from the 30 she received in November of 2016.  When her pain is intense enough, she will take the Toradol and Phenergan and her kidney stone pain improves.  She has not been able to visually see a kidney stone passing and hence has not had stone analysis previously.  The last time she had imaging was in November 2016 which showed bilateral nephrolithiasis    Review of Systems   Constitutional: Negative for activity change, appetite change, chills, diaphoresis, fatigue, fever and unexpected weight change.   HENT: Negative for congestion, dental problem, ear pain, hearing loss, postnasal drip, rhinorrhea, sinus pressure, sneezing, sore throat and trouble swallowing.    Eyes: Positive for visual disturbance. Negative for photophobia, pain and discharge.   Respiratory: Negative for cough, chest tightness, shortness of breath and wheezing.    Cardiovascular: Negative for chest pain, palpitations and leg swelling.   Gastrointestinal: Positive for blood in stool. Negative for abdominal  distention, abdominal pain, constipation, diarrhea, nausea and vomiting.   Endocrine: Negative for cold intolerance, heat intolerance, polydipsia and polyuria.   Genitourinary: Negative for difficulty urinating, dysuria, flank pain, frequency, genital sores, hematuria, menstrual problem and vaginal discharge.   Musculoskeletal: Negative for arthralgias, joint swelling, myalgias and neck pain.   Skin: Negative for rash.   Neurological: Negative for dizziness, syncope, weakness, light-headedness and headaches.   Hematological: Negative for adenopathy. Does not bruise/bleed easily.   Psychiatric/Behavioral: Negative for confusion, dysphoric mood, self-injury, sleep disturbance and suicidal ideas. The patient is not nervous/anxious.        Objective:      Physical Exam   Constitutional: She is oriented to person, place, and time. She appears well-developed and well-nourished. No distress.   HENT:   Head: Normocephalic and atraumatic.   Right Ear: Hearing, tympanic membrane, external ear and ear canal normal.   Left Ear: Hearing, tympanic membrane, external ear and ear canal normal.   Nose: Nose normal.   Mouth/Throat: Oropharynx is clear and moist and mucous membranes are normal. No oral lesions. No oropharyngeal exudate, posterior oropharyngeal edema or posterior oropharyngeal erythema.   Eyes: Pupils are equal, round, and reactive to light. Conjunctivae, EOM and lids are normal. No scleral icterus.   Neck: Normal range of motion. Neck supple. Carotid bruit is not present. No thyroid mass and no thyromegaly present.   Cardiovascular: Normal rate, regular rhythm and normal heart sounds.  No extrasystoles are present. PMI is not displaced. Exam reveals no gallop.   No murmur heard.  Pulmonary/Chest: Effort normal and breath sounds normal. No accessory muscle usage. No respiratory distress.   Clear to auscultation bilaterally.   Abdominal: Soft. Normal appearance and bowel sounds are normal. She exhibits no abdominal  "bruit. There is no hepatosplenomegaly. There is no tenderness. There is no rebound.   Lymphadenopathy:        Head (right side): No submental and no submandibular adenopathy present.        Head (left side): No submental and no submandibular adenopathy present.        Right cervical: No superficial cervical, no deep cervical and no posterior cervical adenopathy present.       Left cervical: No superficial cervical, no deep cervical and no posterior cervical adenopathy present.        Right: No supraclavicular adenopathy present.        Left: No supraclavicular adenopathy present.   Neurological: She is alert and oriented to person, place, and time. She has normal strength. No cranial nerve deficit or sensory deficit.   Skin: Skin is warm, dry and intact.   Psychiatric: She has a normal mood and affect. Her speech is normal and behavior is normal. Thought content normal. Cognition and memory are normal.     Blood pressure 116/72, pulse 83, temperature 98.2 °F (36.8 °C), temperature source Oral, resp. rate 18, height 5' 3" (1.6 m), weight 61.1 kg (134 lb 11.2 oz), last menstrual period 09/20/2006, SpO2 99 %.Body mass index is 23.86 kg/m².          A/P:  1) annual exam.  Health maintenance issues and anticipatory guidance issues were discussed.  We will check fasting labs today.  She will get the shingles shot at the pharmacy.  She will continue to consume a healthy diet and stay physically active.  She will keep up-to-date with her gyn and mammogram visits   2)  History of nephrolithiasis.  Currently asymptomatic.  I did refill the Ultram and Phenergan.  The next time she feels as if she is having stone pain,  I would like for her to let us know so that we can investigate this further    "

## 2020-02-25 ENCOUNTER — PATIENT MESSAGE (OUTPATIENT)
Dept: FAMILY MEDICINE | Facility: CLINIC | Age: 60
End: 2020-02-25

## 2020-04-28 ENCOUNTER — HOSPITAL ENCOUNTER (OUTPATIENT)
Dept: RADIOLOGY | Facility: HOSPITAL | Age: 60
Discharge: HOME | End: 2020-04-28
Attending: OBSTETRICS & GYNECOLOGY
Payer: COMMERCIAL

## 2020-04-28 DIAGNOSIS — R92.8 ABNORMAL MAMMOGRAM: ICD-10-CM

## 2020-04-28 PROCEDURE — 76642 ULTRASOUND BREAST LIMITED: CPT | Mod: 26,RT,, | Performed by: RADIOLOGY

## 2020-04-28 PROCEDURE — 76642 US BREAST RIGHT LIMITED: ICD-10-PCS | Mod: 26,RT,, | Performed by: RADIOLOGY

## 2020-04-28 PROCEDURE — 76642 ULTRASOUND BREAST LIMITED: CPT | Mod: TC,PO,RT

## 2020-07-27 ENCOUNTER — HOSPITAL ENCOUNTER (OUTPATIENT)
Dept: RADIOLOGY | Facility: HOSPITAL | Age: 60
Discharge: HOME | End: 2020-07-27
Attending: NURSE PRACTITIONER
Payer: COMMERCIAL

## 2020-07-27 ENCOUNTER — OFFICE VISIT (OUTPATIENT)
Dept: FAMILY MEDICINE | Facility: CLINIC | Age: 60
End: 2020-07-27
Payer: COMMERCIAL

## 2020-07-27 ENCOUNTER — PATIENT MESSAGE (OUTPATIENT)
Dept: FAMILY MEDICINE | Facility: CLINIC | Age: 60
End: 2020-07-27

## 2020-07-27 VITALS
DIASTOLIC BLOOD PRESSURE: 80 MMHG | HEIGHT: 63 IN | WEIGHT: 143.63 LBS | SYSTOLIC BLOOD PRESSURE: 106 MMHG | BODY MASS INDEX: 25.45 KG/M2 | TEMPERATURE: 99 F | HEART RATE: 74 BPM | OXYGEN SATURATION: 99 %

## 2020-07-27 DIAGNOSIS — R10.12 LEFT UPPER QUADRANT PAIN: ICD-10-CM

## 2020-07-27 DIAGNOSIS — N22 CALCULUS OF URINARY TRACT IN DISEASES CLASSIFIED ELSEWHERE: ICD-10-CM

## 2020-07-27 DIAGNOSIS — R31.9 HEMATURIA, UNSPECIFIED TYPE: Primary | ICD-10-CM

## 2020-07-27 LAB
BILIRUB SERPL-MCNC: NEGATIVE MG/DL
BLOOD URINE, POC: ABNORMAL
CLARITY, POC UA: CLEAR
COLOR, POC UA: ABNORMAL
GLUCOSE UR QL STRIP: NEGATIVE
KETONES UR QL STRIP: NEGATIVE
LEUKOCYTE ESTERASE URINE, POC: ABNORMAL
NITRITE, POC UA: NEGATIVE
PH, POC UA: 7
PROTEIN, POC: NEGATIVE
SPECIFIC GRAVITY, POC UA: 1
UROBILINOGEN, POC UA: NORMAL

## 2020-07-27 PROCEDURE — 99214 OFFICE O/P EST MOD 30 MIN: CPT | Mod: 25,S$GLB,, | Performed by: NURSE PRACTITIONER

## 2020-07-27 PROCEDURE — 74176 CT ABD & PELVIS W/O CONTRAST: CPT | Mod: TC,PO

## 2020-07-27 PROCEDURE — 99214 PR OFFICE/OUTPT VISIT, EST, LEVL IV, 30-39 MIN: ICD-10-PCS | Mod: 25,S$GLB,, | Performed by: NURSE PRACTITIONER

## 2020-07-27 PROCEDURE — 74176 CT RENAL STONE STUDY ABD PELVIS WO: ICD-10-PCS | Mod: 26,,, | Performed by: RADIOLOGY

## 2020-07-27 PROCEDURE — 81002 URINALYSIS NONAUTO W/O SCOPE: CPT | Mod: S$GLB,,, | Performed by: NURSE PRACTITIONER

## 2020-07-27 PROCEDURE — 81002 POCT URINE DIPSTICK WITHOUT MICROSCOPE: ICD-10-PCS | Mod: S$GLB,,, | Performed by: NURSE PRACTITIONER

## 2020-07-27 PROCEDURE — 74176 CT ABD & PELVIS W/O CONTRAST: CPT | Mod: 26,,, | Performed by: RADIOLOGY

## 2020-07-27 RX ORDER — TRAMADOL HYDROCHLORIDE 50 MG/1
50 TABLET ORAL EVERY 6 HOURS PRN
Qty: 30 EACH | Refills: 0 | Status: SHIPPED | OUTPATIENT
Start: 2020-07-27 | End: 2021-07-08

## 2020-07-27 RX ORDER — PROMETHAZINE HYDROCHLORIDE 25 MG/1
25 TABLET ORAL EVERY 4 HOURS
Qty: 30 TABLET | Refills: 0 | Status: CANCELLED | OUTPATIENT
Start: 2020-07-27

## 2020-07-27 RX ORDER — TRAMADOL HYDROCHLORIDE 50 MG/1
50 TABLET ORAL EVERY 6 HOURS PRN
Qty: 30 TABLET | Refills: 0 | Status: CANCELLED | OUTPATIENT
Start: 2020-07-27

## 2020-07-27 RX ORDER — PROMETHAZINE HYDROCHLORIDE 25 MG/1
25 TABLET ORAL EVERY 4 HOURS
Qty: 30 TABLET | Refills: 0 | Status: SHIPPED | OUTPATIENT
Start: 2020-07-27 | End: 2021-07-08

## 2020-07-27 RX ORDER — TAMSULOSIN HYDROCHLORIDE 0.4 MG/1
0.4 CAPSULE ORAL DAILY
Qty: 12 CAPSULE | Refills: 0 | Status: SHIPPED | OUTPATIENT
Start: 2020-07-27 | End: 2021-07-08

## 2020-07-27 NOTE — TELEPHONE ENCOUNTER
----- Message from Hanna  sent at 7/27/2020  1:49 PM CDT -----  Regarding: Rx  Contact: pt  Type: Needs Medical Advice    Who Called: pt     Best Call Back Number:     Additional Information: Requesting a call back from Nurse, regarding Rx traMADol (ULTRAM) 50 mg tablet and Rx promethazine (PHENERGAN) 25 MG tablet 30 tablet were not called in pt is at pharmacy to  ,please call back

## 2020-07-27 NOTE — PROGRESS NOTES
Subjective:       Patient ID: Renate Vang is a 59 y.o. female.    Chief Complaint: Nephrolithiasis (Flare up 7/24 and 7/25. Patient has been taking tramadol 50mg tablets and promethazine 25mg tablets to help alleviate pain )    HPI here with concerns regarding kidney stones. She has had kidney stones in the past. Taking tramadol and promethazine for the pain and nausea. Started on the 24th. Rates pain shoots up to 10.  Took pain medication this morning at 6 am. No trouble passing urine. No blood in urine. Left sided flank pain. States she is concerned that she might not be able to pass it. She denies any fever. See ROS.    The following portion of the patients history was reviewed and updated as appropriate: allergies, current medications, past medical and surgical history. Past social history and problem list reviewed. Family PMH and Past social history reviewed. Tobacco, Illicit drug use reviewed.      Review of patient's allergies indicates:   Allergen Reactions    Adhesive Rash     Blisters with band-aids and surgical dressing         Current Outpatient Medications:     calcium-vitamin D (OSCAL) 250 (625)-125 mg-unit per tablet, Take 1 tablet by mouth once daily., Disp: , Rfl:     glucosamine-chondroitin 500-400 mg tablet, Take 1 tablet by mouth 2 (two) times daily., Disp: , Rfl:     Lactobacillus rhamnosus GG (CULTURELLE) 10 billion cell capsule, Take 1 capsule by mouth once daily., Disp: , Rfl:     magnesium 30 mg Tab, Take by mouth once., Disp: , Rfl:     promethazine (PHENERGAN) 25 MG tablet, Take 1 tablet (25 mg total) by mouth every 4 (four) hours., Disp: 30 tablet, Rfl: 0    RESTASIS 0.05 % ophthalmic emulsion, , Disp: , Rfl:     traMADol (ULTRAM) 50 mg tablet, Take 50 mg by mouth every 6 (six) hours as needed for Pain., Disp: , Rfl:     vitamin D (VITAMIN D3) 1000 units Tab, Take 600 Units by mouth once daily. , Disp: , Rfl:     zinc gluconate 50 mg tablet, Take 25 mg by mouth once daily. ,  Disp: , Rfl:     Past Medical History:   Diagnosis Date    Anxiety resolved    Depression     History of abnormal cervical Pap smear     History of basal cell carcinoma 2011    right axilla    History of breast cancer 2003    at 42 s/p surg and radiation [id number] ER+    History of HPV infection     LOW RISK    History of parathyroidectomy     R lower gland removed due to adenoma    Kidney stones     Osteopenia     last dexa - refuses additional testing     PONV (postoperative nausea and vomiting)     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)        Past Surgical History:   Procedure Laterality Date    BREAST LUMPECTOMY  2003    x2 Right with senital node bx, with radiation    BREAST RECONSTRUCTION      s/p lumpectomy to replace previous implants bilaterally    BREAST SURGERY      breast augmentation prior to lumpectomy     SECTION, LOW TRANSVERSE      x2    COLONOSCOPY N/A 2018    Procedure: COLONOSCOPY;  Surgeon: Khanh Forrester Jr., MD;  Location: [facility];  Service: Endoscopy;  Laterality: N/A;    COLONOSCOPY W/ POLYPECTOMY  2013    HERBIE.   One 1 mm polyp at the hepatic flexure. TUBULAR ADENOMATOUS POLYP.  One 1 mm polyp in the cecum. TUBULAR ADENOMATOUS POLYP.  The examination was otherwise normal.      HERNIA REPAIR      bilateral groin     LASER ABLATION OF CONDYLOMAS  2012    MALIGNANT SKIN LESION EXCISION      nose & under right arm    PARATHYROID GLAND SURGERY      R lower gland removed due to adenoma    TONSILLECTOMY         Social History     Socioeconomic History    Marital status:      Spouse name: Not on file    Number of children: Not on file    Years of education: Not on file    Highest education level: Not on file   Occupational History     Employer: [employer]   Social Needs    Financial resource strain: Not hard at all    Food insecurity     Worry: Never true     Inability: Never true    Transportation needs      Medical: No     Non-medical: No   Tobacco Use    Smoking status: Never Smoker    Smokeless tobacco: Never Used   Substance and Sexual Activity    Alcohol use: Yes     Alcohol/week: 4.0 standard drinks     Types: 4 Glasses of wine per week     Frequency: 4 or more times a week     Drinks per session: 1 or 2     Binge frequency: Never     Comment: 2 drinks three times per week    Drug use: Yes     Types: Benzodiazepines    Sexual activity: Yes     Partners: Male     Birth control/protection: Post-menopausal   Lifestyle    Physical activity     Days per week: 2 days     Minutes per session: 60 min    Stress: Not at all   Relationships    Social connections     Talks on phone: More than three times a week     Gets together: Twice a week     Attends Anabaptism service: Not on file     Active member of club or organization: Yes     Attends meetings of clubs or organizations: 1 to 4 times per year     Relationship status:    Other Topics Concern    Are you pregnant or think you may be? No    Breast-feeding No   Social History Narrative    Not on file     Review of Systems   Constitutional: Negative for fatigue and fever.   HENT: Negative for congestion and voice change.    Eyes: Negative for visual disturbance.   Respiratory: Negative for cough, chest tightness, shortness of breath and wheezing.    Cardiovascular: Negative for chest pain, palpitations and leg swelling.   Gastrointestinal: Positive for nausea (when flank pain gets bad). Negative for abdominal pain, diarrhea and vomiting.   Genitourinary: Positive for flank pain and frequency. Negative for decreased urine volume, difficulty urinating and dysuria.   Musculoskeletal: Negative for arthralgias, back pain and gait problem.   Psychiatric/Behavioral: Negative for decreased concentration, dysphoric mood and sleep disturbance. The patient is not nervous/anxious.        Objective:      /80   Pulse 74   Temp 98.8 °F (37.1 °C) (Temporal)   Ht  "5' 3" (1.6 m)   Wt 65.2 kg (143 lb 10.1 oz)   LMP 09/20/2006   SpO2 99%   BMI 25.44 kg/m²    Physical Exam  Constitutional:       General: She is not in acute distress.     Appearance: Normal appearance. She is well-developed.   HENT:      Head: Normocephalic.   Eyes:      Conjunctiva/sclera: Conjunctivae normal.      Pupils: Pupils are equal, round, and reactive to light.   Neck:      Musculoskeletal: Normal range of motion and neck supple. No edema.      Thyroid: No thyromegaly.      Trachea: Trachea normal. No tracheal tenderness or tracheal deviation.   Cardiovascular:      Rate and Rhythm: Normal rate and regular rhythm.      Pulses: Normal pulses.      Heart sounds: Normal heart sounds. No murmur. No gallop.    Pulmonary:      Effort: Pulmonary effort is normal. No respiratory distress.      Breath sounds: Normal breath sounds. No stridor. No wheezing, rhonchi or rales.   Abdominal:      General: Bowel sounds are normal.      Palpations: Abdomen is soft. There is no splenomegaly or mass.      Tenderness: There is no abdominal tenderness. There is no rebound.   Musculoskeletal: Normal range of motion.      Comments: Gait and coordination normal.  strong, equal. Upper and lower extremity strength normal.    Skin:     General: Skin is warm and dry.      Capillary Refill: Capillary refill takes less than 2 seconds.      Findings: No lesion or rash.   Neurological:      Mental Status: She is alert and oriented to person, place, and time.   Psychiatric:         Mood and Affect: Mood normal.         Speech: Speech normal.         Behavior: Behavior normal.         Thought Content: Thought content normal.         Assessment:       1. Hematuria, unspecified type    2. Left upper quadrant pain    3. Calculus of urinary tract in diseases classified elsewhere        Plan:       Hematuria, unspecified type: will get CT scan for renal stone evaluation.     Left upper quadrant pain: check CT scan.   -     CT Renal " Stone Study ABD Pelvis WO; Future; Expected date: 07/27/2020    Calculus of urinary tract in diseases classified elsewhere: will start on flomax. Continue tramadol and promethazine as needed.  If pain gets worse, if unable to urinate then follow up.   -     CT Renal Stone Study ABD Pelvis WO; Future; Expected date: 07/27/2020  -     POCT urine dipstick without microscope  -     traMADoL (ULTRAM) 50 mg tablet; Take 1 tablet (50 mg total) by mouth every 6 (six) hours as needed for Pain.  Dispense: 30 each; Refill: 0    Other orders  -     tamsulosin (FLOMAX) 0.4 mg Cap; Take 1 capsule (0.4 mg total) by mouth once daily.  Dispense: 12 capsule; Refill: 0  -     promethazine (PHENERGAN) 25 MG tablet; Take 1 tablet (25 mg total) by mouth every 4 (four) hours.  Dispense: 30 tablet; Refill: 0       Continue current medication  Take medications only as prescribed  Healthy diet, exercise  Adequate rest  Adequate hydration  Avoid allergens  Avoid excessive caffeine     follow up after testing complete.

## 2020-09-22 ENCOUNTER — PATIENT OUTREACH (OUTPATIENT)
Dept: ADMINISTRATIVE | Facility: OTHER | Age: 60
End: 2020-09-22

## 2020-09-22 DIAGNOSIS — Z12.31 ENCOUNTER FOR SCREENING MAMMOGRAM FOR MALIGNANT NEOPLASM OF BREAST: Primary | ICD-10-CM

## 2020-09-22 NOTE — PROGRESS NOTES
Health Maintenance Due   Topic Date Due    Shingles Vaccine (1 of 2) 10/01/2009    Pap Smear  04/18/2020    Influenza Vaccine (1) 08/01/2020    Mammogram  10/23/2020     Updates were requested from care everywhere.  Chart was reviewed for overdue Proactive [facility] Encounters ([facility]) topics (CRS, Breast Cancer Screening, Eye exam)  Health Maintenance has been updated.  [facility] immunization registry triggered.  Immunizations were reconciled.  [facility] searched for mammogram. No record found.   Mammogram order placed and tasked to patient.

## 2020-10-22 ENCOUNTER — TELEPHONE (OUTPATIENT)
Dept: OBSTETRICS AND GYNECOLOGY | Facility: CLINIC | Age: 60
End: 2020-10-22

## 2020-10-22 NOTE — TELEPHONE ENCOUNTER
----- Message from Mayelin Harrell, Patient Care Assistant sent at 10/22/2020  9:27 AM CDT -----  Regarding: speak  to nurse  Contact: patient  Type: Needs Medical Advice  Who Called:  patient  Symptoms (please be specific):  na  How long has patient had these symptoms:  na  Pharmacy name and phone #:  na  Best Call Back Number: [phone]    Additional Information: patient would like to know if there is a later appointment for 11/6 . She has a eye appointment @ 8:00 , also . Please call with later appointment that day. Thanks.

## 2020-10-23 ENCOUNTER — TELEPHONE (OUTPATIENT)
Dept: FAMILY MEDICINE | Facility: CLINIC | Age: 60
End: 2020-10-23

## 2020-10-23 ENCOUNTER — OFFICE VISIT (OUTPATIENT)
Dept: FAMILY MEDICINE | Facility: CLINIC | Age: 60
End: 2020-10-23
Payer: COMMERCIAL

## 2020-10-23 VITALS
HEIGHT: 63 IN | BODY MASS INDEX: 25.08 KG/M2 | WEIGHT: 141.56 LBS | DIASTOLIC BLOOD PRESSURE: 70 MMHG | TEMPERATURE: 98 F | RESPIRATION RATE: 18 BRPM | HEART RATE: 110 BPM | SYSTOLIC BLOOD PRESSURE: 108 MMHG

## 2020-10-23 DIAGNOSIS — Z01.818 PREOPERATIVE CLEARANCE: Primary | ICD-10-CM

## 2020-10-23 DIAGNOSIS — H26.9 CATARACT OF LEFT EYE, UNSPECIFIED CATARACT TYPE: ICD-10-CM

## 2020-10-23 PROCEDURE — 90686 FLU VACCINE (QUAD) GREATER THAN OR EQUAL TO 3YO PRESERVATIVE FREE IM: ICD-10-PCS | Mod: S$GLB,,, | Performed by: NURSE PRACTITIONER

## 2020-10-23 PROCEDURE — 90471 IMMUNIZATION ADMIN: CPT | Mod: S$GLB,,, | Performed by: NURSE PRACTITIONER

## 2020-10-23 PROCEDURE — 99214 OFFICE O/P EST MOD 30 MIN: CPT | Mod: 25,S$GLB,, | Performed by: NURSE PRACTITIONER

## 2020-10-23 PROCEDURE — 90471 FLU VACCINE (QUAD) GREATER THAN OR EQUAL TO 3YO PRESERVATIVE FREE IM: ICD-10-PCS | Mod: S$GLB,,, | Performed by: NURSE PRACTITIONER

## 2020-10-23 PROCEDURE — 90686 IIV4 VACC NO PRSV 0.5 ML IM: CPT | Mod: S$GLB,,, | Performed by: NURSE PRACTITIONER

## 2020-10-23 PROCEDURE — 99214 PR OFFICE/OUTPT VISIT, EST, LEVL IV, 30-39 MIN: ICD-10-PCS | Mod: 25,S$GLB,, | Performed by: NURSE PRACTITIONER

## 2020-10-23 RX ORDER — CETIRIZINE HYDROCHLORIDE 10 MG/1
10 TABLET ORAL DAILY
COMMUNITY
End: 2023-01-31

## 2020-10-23 NOTE — PROGRESS NOTES
Subjective:       Patient ID: Renate Vang is a 60 y.o. female.    Chief Complaint: Pre-op Exam (Cataract 11/5, Dr GISSELLE Gomez)    HPI here for preoperative clearance to have eye surgery. Pain medication makes her nauseated. Nausea is the only issue she has had with anesthesia in the past. She will only be getting mild sedation, no General anesthesia needed. She is having surgery by Dr. Cruz. She had macular hole that allowed her cataract to grow to the point she needs removal. She denies any other concerns. She has her well woman exam and mammogram scheduled in two weeks with Dr. Vargas. See ROS.     The following portion of the patients history was reviewed and updated as appropriate: allergies, current medications, past medical and surgical history. Past social history and problem list reviewed. Family PMH and Past social history reviewed. Tobacco, Illicit drug use reviewed.      Review of patient's allergies indicates:   Allergen Reactions    Adhesive Rash     Blisters with band-aids and surgical dressing         Current Outpatient Medications:     calcium-vitamin D (OSCAL) 250 (625)-125 mg-unit per tablet, Take 1 tablet by mouth once daily., Disp: , Rfl:     cetirizine (ZYRTEC) 10 MG tablet, Take 10 mg by mouth once daily., Disp: , Rfl:     glucosamine-chondroitin 500-400 mg tablet, Take 1 tablet by mouth 2 (two) times daily., Disp: , Rfl:     Lactobacillus rhamnosus GG (CULTURELLE) 10 billion cell capsule, Take 1 capsule by mouth once daily., Disp: , Rfl:     RESTASIS 0.05 % ophthalmic emulsion, , Disp: , Rfl:     vit A/vit C/vit E/zinc/copper (ICAPS AREDS ORAL), Take by mouth., Disp: , Rfl:     vitamin D (VITAMIN D3) 1000 units Tab, Take 600 Units by mouth once daily. , Disp: , Rfl:     zinc gluconate 50 mg tablet, Take 25 mg by mouth once daily. , Disp: , Rfl:     magnesium 30 mg Tab, Take by mouth once., Disp: , Rfl:     promethazine (PHENERGAN) 25 MG tablet, Take 1 tablet (25 mg total) by  mouth every 4 (four) hours. (Patient not taking: Reported on 10/23/2020), Disp: 30 tablet, Rfl: 0    tamsulosin (FLOMAX) 0.4 mg Cap, Take 1 capsule (0.4 mg total) by mouth once daily. (Patient not taking: Reported on 10/23/2020), Disp: 12 capsule, Rfl: 0    traMADoL (ULTRAM) 50 mg tablet, Take 1 tablet (50 mg total) by mouth every 6 (six) hours as needed for Pain. (Patient not taking: Reported on 10/23/2020), Disp: 30 each, Rfl: 0    Past Medical History:   Diagnosis Date    Anxiety resolved    Depression     History of abnormal cervical Pap smear     History of basal cell carcinoma 2011    right axilla    History of breast cancer 2003    at 42 s/p surg and radiation T1N0M0 ER+    History of HPV infection     LOW RISK    History of parathyroidectomy     R lower gland removed due to adenoma    Kidney stones     Osteopenia     last dexa - refuses additional testing     PONV (postoperative nausea and vomiting)     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)        Past Surgical History:   Procedure Laterality Date    BREAST LUMPECTOMY  2003    x2 Right with senital node bx, with radiation    BREAST RECONSTRUCTION      s/p lumpectomy to replace previous implants bilaterally    BREAST SURGERY      breast augmentation prior to lumpectomy     SECTION, LOW TRANSVERSE      x2    COLONOSCOPY N/A 2018    Procedure: COLONOSCOPY;  Surgeon: Khanh Forrester Jr., MD;  Location: [facility];  Service: Endoscopy;  Laterality: N/A;    COLONOSCOPY W/ POLYPECTOMY  2013    HERBIE.   One 1 mm polyp at the hepatic flexure. TUBULAR ADENOMATOUS POLYP.  One 1 mm polyp in the cecum. TUBULAR ADENOMATOUS POLYP.  The examination was otherwise normal.      HERNIA REPAIR      bilateral groin     LASER ABLATION OF CONDYLOMAS  2012    MALIGNANT SKIN LESION EXCISION      nose & under right arm    PARATHYROID GLAND SURGERY      R lower gland removed due to adenoma    TONSILLECTOMY         Social  History     Socioeconomic History    Marital status:      Spouse name: Not on file    Number of children: Not on file    Years of education: Not on file    Highest education level: Not on file   Occupational History     Employer: [employer]   Social Needs    Financial resource strain: Not hard at all    Food insecurity     Worry: Never true     Inability: Never true    Transportation needs     Medical: No     Non-medical: No   Tobacco Use    Smoking status: Never Smoker    Smokeless tobacco: Never Used   Substance and Sexual Activity    Alcohol use: Yes     Alcohol/week: 4.0 standard drinks     Types: 4 Glasses of wine per week     Frequency: 4 or more times a week     Drinks per session: 1 or 2     Binge frequency: Never     Comment: 2 drinks three times per week    Drug use: Yes     Types: Benzodiazepines    Sexual activity: Yes     Partners: Male     Birth control/protection: Post-menopausal   Lifestyle    Physical activity     Days per week: 2 days     Minutes per session: 60 min    Stress: Not at all   Relationships    Social connections     Talks on phone: More than three times a week     Gets together: Twice a week     Attends Muslim service: Not on file     Active member of club or organization: Yes     Attends meetings of clubs or organizations: 1 to 4 times per year     Relationship status:    Other Topics Concern    Are you pregnant or think you may be? No    Breast-feeding No   Social History Narrative    Not on file     Review of Systems   Constitutional: Negative for fatigue and fever.   HENT: Negative for congestion, postnasal drip, rhinorrhea and voice change.    Eyes: Positive for visual disturbance.   Respiratory: Negative for cough, chest tightness, shortness of breath and wheezing.    Cardiovascular: Negative for chest pain, palpitations and leg swelling.   Gastrointestinal: Negative for abdominal pain, diarrhea, nausea and vomiting.   Genitourinary:  "Negative for difficulty urinating and dysuria.   Musculoskeletal: Negative for arthralgias, back pain and gait problem.   Skin: Negative for rash and wound.   Neurological: Negative for dizziness, weakness and headaches.   Hematological: Negative for adenopathy. Does not bruise/bleed easily.   Psychiatric/Behavioral: Negative for decreased concentration, dysphoric mood and sleep disturbance. The patient is not nervous/anxious.        Objective:      /70   Pulse 110   Temp 97.7 °F (36.5 °C) (Temporal)   Resp 18   Ht 5' 3" (1.6 m)   Wt 64.2 kg (141 lb 8.6 oz)   LMP 09/20/2006   BMI 25.07 kg/m²       Physical Exam  Constitutional:       General: She is not in acute distress.     Appearance: Normal appearance. She is well-developed and normal weight.   HENT:      Head: Normocephalic.      Nose: Nose normal.      Mouth/Throat:      Mouth: Mucous membranes are moist.      Pharynx: Oropharynx is clear.   Eyes:      Conjunctiva/sclera: Conjunctivae normal.      Pupils: Pupils are equal, round, and reactive to light.   Neck:      Musculoskeletal: Full passive range of motion without pain, normal range of motion and neck supple. No edema.      Thyroid: No thyromegaly.      Trachea: Trachea normal. No tracheal tenderness or tracheal deviation.   Cardiovascular:      Rate and Rhythm: Normal rate and regular rhythm.      Pulses:           Carotid pulses are 2+ on the right side and 2+ on the left side.       Radial pulses are 2+ on the right side and 2+ on the left side.      Heart sounds: Normal heart sounds. No murmur. No gallop.    Pulmonary:      Effort: Pulmonary effort is normal. No respiratory distress.      Breath sounds: Normal breath sounds. No stridor. No wheezing, rhonchi or rales.   Abdominal:      General: Bowel sounds are normal.      Palpations: Abdomen is soft. There is no splenomegaly or mass.      Tenderness: There is no abdominal tenderness. There is no rebound.   Musculoskeletal: Normal range of " motion.      Right lower leg: No edema.      Left lower leg: No edema.      Comments: Gait and coordination normal.  strong, equal. Upper and lower extremity strength normal.    Skin:     General: Skin is warm and dry.      Capillary Refill: Capillary refill takes less than 2 seconds.      Findings: No lesion or rash.   Neurological:      Mental Status: She is alert and oriented to person, place, and time.   Psychiatric:         Attention and Perception: Attention and perception normal.         Mood and Affect: Mood and affect normal.         Speech: Speech normal.         Behavior: Behavior normal.         Assessment:       1. Preoperative clearance    2. Cataract of left eye, unspecified cataract type        Plan:       Preoperative clearance: she is medically cleared for surgery. No EKG, CXR or labs required. Faxed release to provider.    Cataract of left eye, unspecified cataract type: scheduled for surgery on 11/5/20    Other orders  -     Influenza - Quadrivalent (PF)       Continue current medication  Take medications only as prescribed  Healthy diet, exercise  Adequate rest  Adequate hydration  Avoid allergens  Avoid excessive caffeine     follow up with PCP as scheduled.

## 2020-11-04 ENCOUNTER — PATIENT OUTREACH (OUTPATIENT)
Dept: ADMINISTRATIVE | Facility: OTHER | Age: 60
End: 2020-11-04

## 2020-11-04 NOTE — PROGRESS NOTES
Health Maintenance Due   Topic Date Due    Pap Smear  04/18/2020     Updates were requested from care everywhere.  Chart was reviewed for overdue Proactive [facility] Encounters ([facility]) topics (CRS, Breast Cancer Screening, Eye exam)  Health Maintenance has been updated.  LINKS immunization registry triggered.  Immunizations were reconciled.

## 2020-11-06 ENCOUNTER — PATIENT MESSAGE (OUTPATIENT)
Dept: FAMILY MEDICINE | Facility: CLINIC | Age: 60
End: 2020-11-06

## 2020-11-06 ENCOUNTER — OFFICE VISIT (OUTPATIENT)
Dept: OBSTETRICS AND GYNECOLOGY | Facility: CLINIC | Age: 60
End: 2020-11-06
Payer: COMMERCIAL

## 2020-11-06 ENCOUNTER — HOSPITAL ENCOUNTER (OUTPATIENT)
Dept: RADIOLOGY | Facility: HOSPITAL | Age: 60
Discharge: HOME | End: 2020-11-06
Attending: FAMILY MEDICINE
Payer: COMMERCIAL

## 2020-11-06 VITALS
BODY MASS INDEX: 25.35 KG/M2 | WEIGHT: 143.06 LBS | SYSTOLIC BLOOD PRESSURE: 120 MMHG | DIASTOLIC BLOOD PRESSURE: 86 MMHG | HEIGHT: 63 IN

## 2020-11-06 DIAGNOSIS — Z12.31 ENCOUNTER FOR SCREENING MAMMOGRAM FOR MALIGNANT NEOPLASM OF BREAST: ICD-10-CM

## 2020-11-06 DIAGNOSIS — N76.0 ACUTE VAGINITIS: ICD-10-CM

## 2020-11-06 DIAGNOSIS — N95.2 POSTMENOPAUSAL ATROPHIC VAGINITIS: ICD-10-CM

## 2020-11-06 DIAGNOSIS — N89.0 VAIN I (VAGINAL INTRAEPITHELIAL NEOPLASIA GRADE I): ICD-10-CM

## 2020-11-06 DIAGNOSIS — M81.0 OSTEOPOROSIS, UNSPECIFIED OSTEOPOROSIS TYPE, UNSPECIFIED PATHOLOGICAL FRACTURE PRESENCE: ICD-10-CM

## 2020-11-06 DIAGNOSIS — Z85.3 HISTORY OF BREAST CANCER: ICD-10-CM

## 2020-11-06 DIAGNOSIS — Z01.419 GYNECOLOGIC EXAM NORMAL: Primary | ICD-10-CM

## 2020-11-06 DIAGNOSIS — Z17.0 ESTROGEN RECEPTOR POSITIVE STATUS (ER+): ICD-10-CM

## 2020-11-06 PROCEDURE — 77063 BREAST TOMOSYNTHESIS BI: CPT | Mod: 26,,, | Performed by: RADIOLOGY

## 2020-11-06 PROCEDURE — 87801 DETECT AGNT MULT DNA AMPLI: CPT

## 2020-11-06 PROCEDURE — 99999 PR PBB SHADOW E&M-EST. PATIENT-LVL III: CPT | Mod: PBBFAC,,, | Performed by: OBSTETRICS & GYNECOLOGY

## 2020-11-06 PROCEDURE — 77067 SCR MAMMO BI INCL CAD: CPT | Mod: TC,PO

## 2020-11-06 PROCEDURE — 99396 PR PREVENTIVE VISIT,EST,40-64: ICD-10-PCS | Mod: S$GLB,,, | Performed by: OBSTETRICS & GYNECOLOGY

## 2020-11-06 PROCEDURE — 99999 PR PBB SHADOW E&M-EST. PATIENT-LVL III: ICD-10-PCS | Mod: PBBFAC,,, | Performed by: OBSTETRICS & GYNECOLOGY

## 2020-11-06 PROCEDURE — 77067 SCR MAMMO BI INCL CAD: CPT | Mod: 26,,, | Performed by: RADIOLOGY

## 2020-11-06 PROCEDURE — 99396 PREV VISIT EST AGE 40-64: CPT | Mod: S$GLB,,, | Performed by: OBSTETRICS & GYNECOLOGY

## 2020-11-06 PROCEDURE — 77067 MAMMO DIGITAL SCREENING BILAT WITH TOMO: ICD-10-PCS | Mod: 26,,, | Performed by: RADIOLOGY

## 2020-11-06 PROCEDURE — 87481 CANDIDA DNA AMP PROBE: CPT | Mod: 59

## 2020-11-06 PROCEDURE — 77063 MAMMO DIGITAL SCREENING BILAT WITH TOMO: ICD-10-PCS | Mod: 26,,, | Performed by: RADIOLOGY

## 2020-11-06 NOTE — PROGRESS NOTES
Chief Complaint   Patient presents with    [facility]    Well Woman       History of Present Illness: Renate Vang is a 60 y.o. female that presents today 2020 for well gyn visit.    Past Medical History:   Diagnosis Date    Anxiety resolved    Breast cancer     Depression     History of abnormal cervical Pap smear     History of basal cell carcinoma 2011    right axilla    History of breast cancer 2003    at 42 s/p surg and radiation T1N0M0 ER+    History of HPV infection     LOW RISK    History of parathyroidectomy     R lower gland removed due to adenoma    Kidney stones     Osteopenia     last dexa - refuses additional testing     PONV (postoperative nausea and vomiting)     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)        Past Surgical History:   Procedure Laterality Date    AUGMENTATION OF BREAST      BREAST LUMPECTOMY  2003    x2 Right with senital node bx, with radiation    BREAST RECONSTRUCTION      s/p lumpectomy to replace previous implants bilaterally    BREAST SURGERY      breast augmentation prior to lumpectomy    cataract surgery       SECTION, LOW TRANSVERSE      x2    COLONOSCOPY N/A 2018    Procedure: COLONOSCOPY;  Surgeon: Khanh Forrester Jr., MD;  Location: [facility];  Service: Endoscopy;  Laterality: N/A;    COLONOSCOPY W/ POLYPECTOMY  2013    HERBIE.   One 1 mm polyp at the hepatic flexure. TUBULAR ADENOMATOUS POLYP.  One 1 mm polyp in the cecum. TUBULAR ADENOMATOUS POLYP.  The examination was otherwise normal.      HERNIA REPAIR      bilateral groin     LASER ABLATION OF CONDYLOMAS  2012    MALIGNANT SKIN LESION EXCISION      nose & under right arm    PARATHYROID GLAND SURGERY      R lower gland removed due to adenoma    TONSILLECTOMY         Current Outpatient Medications   Medication Sig Dispense Refill    calcium-vitamin D (OSCAL) 250 (625)-125 mg-unit per tablet Take 1 tablet by mouth once daily.      cetirizine  (ZYRTEC) 10 MG tablet Take 10 mg by mouth once daily.      glucosamine-chondroitin 500-400 mg tablet Take 1 tablet by mouth 2 (two) times daily.      Lactobacillus rhamnosus GG (CULTURELLE) 10 billion cell capsule Take 1 capsule by mouth once daily.      magnesium 30 mg Tab Take by mouth once.      RESTASIS 0.05 % ophthalmic emulsion       vit A/vit C/vit E/zinc/copper (ICAPS AREDS ORAL) Take by mouth.      vitamin D (VITAMIN D3) 1000 units Tab Take 600 Units by mouth once daily.       zinc gluconate 50 mg tablet Take 25 mg by mouth once daily.       promethazine (PHENERGAN) 25 MG tablet Take 1 tablet (25 mg total) by mouth every 4 (four) hours. (Patient not taking: Reported on 10/23/2020) 30 tablet 0    tamsulosin (FLOMAX) 0.4 mg Cap Take 1 capsule (0.4 mg total) by mouth once daily. (Patient not taking: Reported on 10/23/2020) 12 capsule 0    traMADoL (ULTRAM) 50 mg tablet Take 1 tablet (50 mg total) by mouth every 6 (six) hours as needed for Pain. (Patient not taking: Reported on 10/23/2020) 30 each 0     No current facility-administered medications for this visit.        Review of patient's allergies indicates:   Allergen Reactions    Adhesive Rash     Blisters with band-aids and surgical dressing       Family History   Problem Relation Age of Onset    Glaucoma Mother     Osteoarthritis Mother     Arrhythmia Mother     Fibromyalgia Mother     Kidney disease Mother     Cancer Maternal Uncle         colon    Hypertension Father     Hashimoto's thyroiditis Sister     Anxiety disorder Sister     Heart disease Maternal Grandmother     COPD Maternal Grandmother     Tuberculosis Maternal Grandmother     Hypertension Maternal Grandmother     COPD Maternal Grandfather     Osteoarthritis Maternal Grandfather     COPD Paternal Grandfather     Breast cancer Cousin     Ovarian cancer Neg Hx        Social History     Socioeconomic History    Marital status:      Spouse name: Not on file     Number of children: Not on file    Years of education: Not on file    Highest education level: Not on file   Occupational History     Employer: [employer]   Social Needs    Financial resource strain: Not hard at all    Food insecurity     Worry: Never true     Inability: Never true    Transportation needs     Medical: No     Non-medical: No   Tobacco Use    Smoking status: Never Smoker    Smokeless tobacco: Never Used   Substance and Sexual Activity    Alcohol use: Yes     Alcohol/week: 4.0 standard drinks     Types: 4 Glasses of wine per week     Frequency: 4 or more times a week     Drinks per session: 1 or 2     Binge frequency: Never     Comment: 2 drinks three times per week    Drug use: Yes     Types: Benzodiazepines    Sexual activity: Yes     Partners: Male     Birth control/protection: Post-menopausal   Lifestyle    Physical activity     Days per week: 2 days     Minutes per session: 60 min    Stress: Not at all   Relationships    Social connections     Talks on phone: More than three times a week     Gets together: Twice a week     Attends Latter day service: Not on file     Active member of club or organization: Yes     Attends meetings of clubs or organizations: 1 to 4 times per year     Relationship status:    Other Topics Concern    Are you pregnant or think you may be? No    Breast-feeding No   Social History Narrative    Not on file       OB History    Para Term  AB Living   5 4 2   1     SAB TAB Ectopic Multiple Live Births     1            # Outcome Date GA Lbr Manfred/2nd Weight Sex Delivery Anes PTL Lv   5 Term            4 Term            3 TAB            2 Para      CS-LTranv      1 Para      CS-LTranv          Review of Symptoms:  GENERAL: Denies weight gain or weight loss. Feeling well overall.   SKIN: Denies rash or lesions.   HEAD: Denies head injury or headache.   NODES: Denies enlarged lymph nodes.   CHEST: Denies chest pain or shortness of  "breath.   CARDIOVASCULAR: Denies palpitations or left sided chest pain.   ABDOMEN: No abdominal pain, constipation, diarrhea, nausea, vomiting or rectal bleeding.   URINARY: No frequency, dysuria, hematuria, or burning on urination.  HEMATOLOGIC: No easy bruisability or excessive bleeding.   MUSCULOSKELETAL: Denies joint pain or swelling.     /86   Ht 5' 3" (1.6 m)   Wt 64.9 kg (143 lb 1.3 oz)   LMP 09/20/2006   Physical Exam:  APPEARANCE: Well nourished, well developed, in no acute distress.  SKIN: Normal skin turgor, no lesions.  NECK: Neck symmetric without masses   RESPIRATORY: Normal respiratory effort with no retractions or use of accessory muscles  CARDIOVASCULAR: Peripheral vascular system with no swelling no varicosities and palpation of pulses normal  LYMPHATIC: No enlargements of the lymph nodes noted in the neck, axillae, or groin  ABDOMEN: Soft. No tenderness or masses. No hepatosplenomegaly. No hernias.  BREASTS: Symmetrical, no skin changes or visible lesions. No palpable masses, nipple discharge or adenopathy bilaterally.  PELVIC: Normal external female genitalia without lesions. Normal hair distribution. Adequate perineal body, normal urethral meatus. Urethra with no masses.  Bladder nontender. Vagina moist and well rugated without lesions or discharge. Cervix pink and without lesions. No significant cystocele or rectocele. Bimanual exam showed uterus normal size, shape, position, mobile and nontender. Adnexa without masses or tenderness. Urethra and bladder normal.   EXTREMITIES: No clubbing cyanosis or edema.    ASSESSMENT/PLAN:  Gynecologic exam normal    History of breast cancer    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)--2012    Osteoporosis, unspecified osteoporosis type, unspecified pathological fracture presence    Postmenopausal atrophic vaginitis    Estrogen receptor positive status (ER+)    Acute vaginitis  -     Vaginosis Screen by DNA Probe          Patient was " counseled today on Pelvic exams and Pap Smear guidelines.   We discussed STD screening if at high risk for a STD.  We discussed recommendation for breast cancer screening with mammogram every other year after the age of 40 and annually after the age of 50.    We discussed colon cancer screening when indicated.   Osteoporosis screening discussed when indicated.   She was advised to see her primary care physician for all other health maintenance.     FOLLOW-UP with me for next routine visit.

## 2020-11-13 LAB
BACTERIAL VAGINOSIS DNA: NEGATIVE
CANDIDA GLABRATA DNA: NEGATIVE
CANDIDA KRUSEI DNA: NEGATIVE
CANDIDA RRNA VAG QL PROBE: NEGATIVE
T VAGINALIS RRNA GENITAL QL PROBE: NEGATIVE

## 2020-12-18 ENCOUNTER — NURSE TRIAGE (OUTPATIENT)
Dept: ADMINISTRATIVE | Facility: CLINIC | Age: 60
End: 2020-12-18

## 2020-12-18 NOTE — TELEPHONE ENCOUNTER
"[facility]    Pt also reports abd pain, history of "vein stones", cough, sore throat, weakness, chills, and body aches. Also reports tightness in chest when breathing in and racign heart but pt thinks that's related to anxiety.  Admits racing heart at this time. Advised pt go to ED/[facility] for eval. VU. Advised pt if there is no provider at [facility], she needs to go to another [facility] or ED for eval. VU.     Reason for Disposition   Chest pain    Additional Information   Negative: Severe difficulty breathing (e.g., struggling for each breath, speaks in single words)   Negative: Difficult to awaken or acting confused (e.g., disoriented, slurred speech)   Negative: Bluish (or gray) lips or face now   Negative: Shock suspected (e.g., cold/pale/clammy skin, too weak to stand, low BP, rapid pulse)   Negative: Sounds like a life-threatening emergency to the triager   Negative: SEVERE or constant chest pain or pressure (Exception: mild central chest pain, present only when coughing)   Negative: MODERATE difficulty breathing (e.g., speaks in phrases, SOB even at rest, pulse 100-120)   Negative: MILD difficulty breathing (e.g., minimal/no SOB at rest, SOB with walking, pulse <100)    Protocols used: CORONAVIRUS (COVID-19) DIAGNOSED OR [id number]      "

## 2021-05-06 ENCOUNTER — PATIENT MESSAGE (OUTPATIENT)
Dept: RESEARCH | Facility: HOSPITAL | Age: 61
End: 2021-05-06

## 2021-06-01 ENCOUNTER — OFFICE VISIT (OUTPATIENT)
Dept: DERMATOLOGY | Facility: CLINIC | Age: 61
End: 2021-06-01
Payer: COMMERCIAL

## 2021-06-01 VITALS — WEIGHT: 143 LBS | BODY MASS INDEX: 25.34 KG/M2 | HEIGHT: 63 IN

## 2021-06-01 DIAGNOSIS — Z85.828 PERSONAL HISTORY OF MALIGNANT NEOPLASM OF SKIN: ICD-10-CM

## 2021-06-01 DIAGNOSIS — D23.9 DERMATOFIBROMA: ICD-10-CM

## 2021-06-01 DIAGNOSIS — D22.9 MULTIPLE BENIGN NEVI: Primary | ICD-10-CM

## 2021-06-01 DIAGNOSIS — L82.1 SEBORRHEIC KERATOSES: ICD-10-CM

## 2021-06-01 DIAGNOSIS — L81.4 LENTIGINES: ICD-10-CM

## 2021-06-01 DIAGNOSIS — L57.0 ACTINIC KERATOSIS: ICD-10-CM

## 2021-06-01 PROCEDURE — 99999 PR PBB SHADOW E&M-EST. PATIENT-LVL III: ICD-10-PCS | Mod: PBBFAC,,, | Performed by: DERMATOLOGY

## 2021-06-01 PROCEDURE — 99214 PR OFFICE/OUTPT VISIT, EST, LEVL IV, 30-39 MIN: ICD-10-PCS | Mod: 25,S$GLB,, | Performed by: DERMATOLOGY

## 2021-06-01 PROCEDURE — 99999 PR PBB SHADOW E&M-EST. PATIENT-LVL III: CPT | Mod: PBBFAC,,, | Performed by: DERMATOLOGY

## 2021-06-01 PROCEDURE — 99214 OFFICE O/P EST MOD 30 MIN: CPT | Mod: 25,S$GLB,, | Performed by: DERMATOLOGY

## 2021-06-01 RX ORDER — FLUOROURACIL 50 MG/G
CREAM TOPICAL 2 TIMES DAILY
Qty: 40 G | Refills: 1 | Status: SHIPPED | OUTPATIENT
Start: 2021-06-01 | End: 2022-01-08

## 2021-07-08 ENCOUNTER — OFFICE VISIT (OUTPATIENT)
Dept: FAMILY MEDICINE | Facility: CLINIC | Age: 61
End: 2021-07-08
Payer: COMMERCIAL

## 2021-07-08 VITALS
BODY MASS INDEX: 25.62 KG/M2 | TEMPERATURE: 98 F | WEIGHT: 144.63 LBS | HEART RATE: 76 BPM | DIASTOLIC BLOOD PRESSURE: 76 MMHG | RESPIRATION RATE: 18 BRPM | SYSTOLIC BLOOD PRESSURE: 110 MMHG | HEIGHT: 63 IN

## 2021-07-08 DIAGNOSIS — Z00.00 ANNUAL PHYSICAL EXAM: Primary | ICD-10-CM

## 2021-07-08 DIAGNOSIS — R00.2 PALPITATIONS: ICD-10-CM

## 2021-07-08 PROCEDURE — 93010 ELECTROCARDIOGRAM REPORT: CPT | Mod: S$GLB,,, | Performed by: INTERNAL MEDICINE

## 2021-07-08 PROCEDURE — 93010 EKG 12-LEAD: ICD-10-PCS | Mod: S$GLB,,, | Performed by: INTERNAL MEDICINE

## 2021-07-08 PROCEDURE — 93005 EKG 12-LEAD: ICD-10-PCS | Mod: S$GLB,,, | Performed by: FAMILY MEDICINE

## 2021-07-08 PROCEDURE — 99396 PR PREVENTIVE VISIT,EST,40-64: ICD-10-PCS | Mod: S$GLB,,, | Performed by: FAMILY MEDICINE

## 2021-07-08 PROCEDURE — 93005 ELECTROCARDIOGRAM TRACING: CPT | Mod: S$GLB,,, | Performed by: FAMILY MEDICINE

## 2021-07-08 PROCEDURE — 99396 PREV VISIT EST AGE 40-64: CPT | Mod: S$GLB,,, | Performed by: FAMILY MEDICINE

## 2021-07-09 ENCOUNTER — LAB VISIT (OUTPATIENT)
Dept: LAB | Facility: HOSPITAL | Age: 61
End: 2021-07-09
Attending: FAMILY MEDICINE
Payer: COMMERCIAL

## 2021-07-09 DIAGNOSIS — R00.2 PALPITATIONS: ICD-10-CM

## 2021-07-09 DIAGNOSIS — Z00.00 ANNUAL PHYSICAL EXAM: ICD-10-CM

## 2021-07-09 LAB
25(OH)D3+25(OH)D2 SERPL-MCNC: 43 NG/ML (ref 30–96)
ALBUMIN SERPL BCP-MCNC: 4 G/DL (ref 3.5–5.2)
ALP SERPL-CCNC: 71 U/L (ref 55–135)
ALT SERPL W/O P-5'-P-CCNC: 17 U/L (ref 10–44)
ANION GAP SERPL CALC-SCNC: 8 MMOL/L (ref 8–16)
AST SERPL-CCNC: 20 U/L (ref 10–40)
BASOPHILS # BLD AUTO: 0.04 K/UL (ref 0–0.2)
BASOPHILS NFR BLD: 0.8 % (ref 0–1.9)
BILIRUB SERPL-MCNC: 0.6 MG/DL (ref 0.1–1)
BUN SERPL-MCNC: 17 MG/DL (ref 8–23)
CALCIUM SERPL-MCNC: 9.6 MG/DL (ref 8.7–10.5)
CHLORIDE SERPL-SCNC: 105 MMOL/L (ref 95–110)
CHOLEST SERPL-MCNC: 230 MG/DL (ref 120–199)
CHOLEST/HDLC SERPL: 3 {RATIO} (ref 2–5)
CO2 SERPL-SCNC: 27 MMOL/L (ref 23–29)
CREAT SERPL-MCNC: 0.8 MG/DL (ref 0.5–1.4)
DIFFERENTIAL METHOD: NORMAL
EOSINOPHIL # BLD AUTO: 0.1 K/UL (ref 0–0.5)
EOSINOPHIL NFR BLD: 1.9 % (ref 0–8)
ERYTHROCYTE [DISTWIDTH] IN BLOOD BY AUTOMATED COUNT: 13.2 % (ref 11.5–14.5)
EST. GFR  (AFRICAN AMERICAN): >60 ML/MIN/1.73 M^2
EST. GFR  (NON AFRICAN AMERICAN): >60 ML/MIN/1.73 M^2
GLUCOSE SERPL-MCNC: 87 MG/DL (ref 70–110)
HCT VFR BLD AUTO: 42.5 % (ref 37–48.5)
HDLC SERPL-MCNC: 76 MG/DL (ref 40–75)
HDLC SERPL: 33 % (ref 20–50)
HGB BLD-MCNC: 13.6 G/DL (ref 12–16)
IMM GRANULOCYTES # BLD AUTO: 0.01 K/UL (ref 0–0.04)
IMM GRANULOCYTES NFR BLD AUTO: 0.2 % (ref 0–0.5)
LDLC SERPL CALC-MCNC: 138.2 MG/DL (ref 63–159)
LYMPHOCYTES # BLD AUTO: 2.3 K/UL (ref 1–4.8)
LYMPHOCYTES NFR BLD: 43.2 % (ref 18–48)
MAGNESIUM SERPL-MCNC: 1.9 MG/DL (ref 1.6–2.6)
MCH RBC QN AUTO: 28.6 PG (ref 27–31)
MCHC RBC AUTO-ENTMCNC: 32 G/DL (ref 32–36)
MCV RBC AUTO: 90 FL (ref 82–98)
MONOCYTES # BLD AUTO: 0.4 K/UL (ref 0.3–1)
MONOCYTES NFR BLD: 7 % (ref 4–15)
NEUTROPHILS # BLD AUTO: 2.5 K/UL (ref 1.8–7.7)
NEUTROPHILS NFR BLD: 46.9 % (ref 38–73)
NONHDLC SERPL-MCNC: 154 MG/DL
NRBC BLD-RTO: 0 /100 WBC
PLATELET # BLD AUTO: 271 K/UL (ref 150–450)
PMV BLD AUTO: 9.7 FL (ref 9.2–12.9)
POTASSIUM SERPL-SCNC: 4.2 MMOL/L (ref 3.5–5.1)
PROT SERPL-MCNC: 7.2 G/DL (ref 6–8.4)
RBC # BLD AUTO: 4.75 M/UL (ref 4–5.4)
SODIUM SERPL-SCNC: 140 MMOL/L (ref 136–145)
TRIGL SERPL-MCNC: 79 MG/DL (ref 30–150)
TSH SERPL DL<=0.005 MIU/L-ACNC: 1.45 UIU/ML (ref 0.4–4)
WBC # BLD AUTO: 5.32 K/UL (ref 3.9–12.7)

## 2021-07-09 PROCEDURE — 84443 ASSAY THYROID STIM HORMONE: CPT | Performed by: FAMILY MEDICINE

## 2021-07-09 PROCEDURE — 85025 COMPLETE CBC W/AUTO DIFF WBC: CPT | Performed by: FAMILY MEDICINE

## 2021-07-09 PROCEDURE — 36415 COLL VENOUS BLD VENIPUNCTURE: CPT | Mod: PO | Performed by: FAMILY MEDICINE

## 2021-07-09 PROCEDURE — 83735 ASSAY OF MAGNESIUM: CPT | Performed by: FAMILY MEDICINE

## 2021-07-09 PROCEDURE — 80053 COMPREHEN METABOLIC PANEL: CPT | Performed by: FAMILY MEDICINE

## 2021-07-09 PROCEDURE — 82306 VITAMIN D 25 HYDROXY: CPT | Performed by: FAMILY MEDICINE

## 2021-07-09 PROCEDURE — 80061 LIPID PANEL: CPT | Performed by: FAMILY MEDICINE

## 2021-07-10 ENCOUNTER — PATIENT MESSAGE (OUTPATIENT)
Dept: FAMILY MEDICINE | Facility: CLINIC | Age: 61
End: 2021-07-10

## 2021-07-11 ENCOUNTER — PATIENT MESSAGE (OUTPATIENT)
Dept: FAMILY MEDICINE | Facility: CLINIC | Age: 61
End: 2021-07-11

## 2021-07-11 DIAGNOSIS — R00.2 PALPITATIONS: Primary | ICD-10-CM

## 2021-07-19 ENCOUNTER — PATIENT MESSAGE (OUTPATIENT)
Dept: FAMILY MEDICINE | Facility: CLINIC | Age: 61
End: 2021-07-19

## 2021-07-19 DIAGNOSIS — J02.9 SORE THROAT: Primary | ICD-10-CM

## 2021-08-09 ENCOUNTER — OFFICE VISIT (OUTPATIENT)
Dept: OTOLARYNGOLOGY | Facility: CLINIC | Age: 61
End: 2021-08-09
Payer: COMMERCIAL

## 2021-08-09 ENCOUNTER — CLINICAL SUPPORT (OUTPATIENT)
Dept: AUDIOLOGY | Facility: CLINIC | Age: 61
End: 2021-08-09
Payer: COMMERCIAL

## 2021-08-09 VITALS — WEIGHT: 147.25 LBS | BODY MASS INDEX: 26.09 KG/M2 | HEIGHT: 63 IN

## 2021-08-09 DIAGNOSIS — H92.03 REFERRED OTALGIA, BILATERAL: ICD-10-CM

## 2021-08-09 DIAGNOSIS — J35.8 TONSILLOLITH: Primary | ICD-10-CM

## 2021-08-09 DIAGNOSIS — H92.03 ACUTE OTALGIA, BILATERAL: Primary | ICD-10-CM

## 2021-08-09 DIAGNOSIS — R07.0 THROAT DISCOMFORT: ICD-10-CM

## 2021-08-09 PROCEDURE — 99203 PR OFFICE/OUTPT VISIT, NEW, LEVL III, 30-44 MIN: ICD-10-PCS | Mod: S$GLB,,, | Performed by: NURSE PRACTITIONER

## 2021-08-09 PROCEDURE — 99999 PR PBB SHADOW E&M-EST. PATIENT-LVL III: CPT | Mod: PBBFAC,,, | Performed by: NURSE PRACTITIONER

## 2021-08-09 PROCEDURE — 99999 PR PBB SHADOW E&M-EST. PATIENT-LVL III: ICD-10-PCS | Mod: PBBFAC,,, | Performed by: NURSE PRACTITIONER

## 2021-08-09 PROCEDURE — 92567 TYMPANOMETRY: CPT | Mod: S$GLB,,, | Performed by: AUDIOLOGIST

## 2021-08-09 PROCEDURE — 99203 OFFICE O/P NEW LOW 30 MIN: CPT | Mod: S$GLB,,, | Performed by: NURSE PRACTITIONER

## 2021-08-09 PROCEDURE — 92567 PR TYMPA2METRY: ICD-10-PCS | Mod: S$GLB,,, | Performed by: AUDIOLOGIST

## 2021-08-16 ENCOUNTER — PATIENT MESSAGE (OUTPATIENT)
Dept: FAMILY MEDICINE | Facility: CLINIC | Age: 61
End: 2021-08-16

## 2021-08-16 ENCOUNTER — CLINICAL SUPPORT (OUTPATIENT)
Dept: CARDIOLOGY | Facility: HOSPITAL | Age: 61
End: 2021-08-16
Attending: FAMILY MEDICINE
Payer: COMMERCIAL

## 2021-08-16 VITALS — WEIGHT: 147 LBS | HEIGHT: 63 IN | BODY MASS INDEX: 26.05 KG/M2

## 2021-08-16 LAB
AV INDEX (PROSTH): 1.03
AV MEAN GRADIENT: 5 MMHG
AV PEAK GRADIENT: 8 MMHG
AV VALVE AREA: 3.53 CM2
AV VELOCITY RATIO: 0.84
BSA FOR ECHO PROCEDURE: 1.72 M2
CV ECHO LV RWT: 0.44 CM
DOP CALC AO PEAK VEL: 1.45 M/S
DOP CALC AO VTI: 26.9 CM
DOP CALC LVOT AREA: 3.4 CM2
DOP CALC LVOT DIAMETER: 2.09 CM
DOP CALC LVOT PEAK VEL: 1.22 M/S
DOP CALC LVOT STROKE VOLUME: 94.91 CM3
DOP CALCLVOT PEAK VEL VTI: 27.68 CM
E WAVE DECELERATION TIME: 170.77 MSEC
E/A RATIO: 1.33
E/E' RATIO: 8.1 M/S
ECHO LV POSTERIOR WALL: 0.75 CM (ref 0.6–1.1)
EJECTION FRACTION: 65 %
FRACTIONAL SHORTENING: 44 % (ref 28–44)
INTERVENTRICULAR SEPTUM: 1 CM (ref 0.6–1.1)
LA MAJOR: 4.67 CM
LA MINOR: 4.64 CM
LA WIDTH: 4.15 CM
LEFT ATRIUM SIZE: 2.74 CM
LEFT ATRIUM VOLUME INDEX: 26.5 ML/M2
LEFT ATRIUM VOLUME: 44.99 CM3
LEFT INTERNAL DIMENSION IN SYSTOLE: 1.88 CM (ref 2.1–4)
LEFT VENTRICLE DIASTOLIC VOLUME INDEX: 27.53 ML/M2
LEFT VENTRICLE DIASTOLIC VOLUME: 46.8 ML
LEFT VENTRICLE MASS INDEX: 48 G/M2
LEFT VENTRICLE SYSTOLIC VOLUME INDEX: 6.4 ML/M2
LEFT VENTRICLE SYSTOLIC VOLUME: 10.92 ML
LEFT VENTRICULAR INTERNAL DIMENSION IN DIASTOLE: 3.38 CM (ref 3.5–6)
LEFT VENTRICULAR MASS: 80.8 G
LV LATERAL E/E' RATIO: 8.1 M/S
LV SEPTAL E/E' RATIO: 8.1 M/S
MV A" WAVE DURATION": 7.8 MSEC
MV PEAK A VEL: 0.61 M/S
MV PEAK E VEL: 0.81 M/S
MV STENOSIS PRESSURE HALF TIME: 49.52 MS
MV VALVE AREA P 1/2 METHOD: 4.44 CM2
PULM VEIN S/D RATIO: 1.22
PV PEAK D VEL: 0.51 M/S
PV PEAK S VEL: 0.62 M/S
RA MAJOR: 4.24 CM
RA PRESSURE: 3 MMHG
RA WIDTH: 3.41 CM
RIGHT VENTRICULAR END-DIASTOLIC DIMENSION: 3.03 CM
RV TISSUE DOPPLER FREE WALL SYSTOLIC VELOCITY 1 (APICAL 4 CHAMBER VIEW): 16.52 CM/S
TDI LATERAL: 0.1 M/S
TDI SEPTAL: 0.1 M/S
TDI: 0.1 M/S
TRICUSPID ANNULAR PLANE SYSTOLIC EXCURSION: 2.54 CM

## 2021-08-16 PROCEDURE — 93306 TTE W/DOPPLER COMPLETE: CPT | Mod: PO

## 2021-10-29 ENCOUNTER — PATIENT MESSAGE (OUTPATIENT)
Dept: FAMILY MEDICINE | Facility: CLINIC | Age: 61
End: 2021-10-29
Payer: COMMERCIAL

## 2021-11-11 ENCOUNTER — OFFICE VISIT (OUTPATIENT)
Dept: OBSTETRICS AND GYNECOLOGY | Facility: CLINIC | Age: 61
End: 2021-11-11
Payer: COMMERCIAL

## 2021-11-11 VITALS
DIASTOLIC BLOOD PRESSURE: 70 MMHG | SYSTOLIC BLOOD PRESSURE: 98 MMHG | HEIGHT: 63 IN | BODY MASS INDEX: 25.9 KG/M2 | WEIGHT: 146.19 LBS

## 2021-11-11 DIAGNOSIS — Z17.0 ESTROGEN RECEPTOR POSITIVE STATUS (ER+): ICD-10-CM

## 2021-11-11 DIAGNOSIS — M81.0 OSTEOPOROSIS, UNSPECIFIED OSTEOPOROSIS TYPE, UNSPECIFIED PATHOLOGICAL FRACTURE PRESENCE: ICD-10-CM

## 2021-11-11 DIAGNOSIS — Z01.419 WELL WOMAN EXAM WITH ROUTINE GYNECOLOGICAL EXAM: Primary | ICD-10-CM

## 2021-11-11 DIAGNOSIS — N89.0 VAIN I (VAGINAL INTRAEPITHELIAL NEOPLASIA GRADE I): ICD-10-CM

## 2021-11-11 DIAGNOSIS — Z85.3 HISTORY OF BREAST CANCER: ICD-10-CM

## 2021-11-11 PROCEDURE — 87624 HPV HI-RISK TYP POOLED RSLT: CPT | Performed by: OBSTETRICS & GYNECOLOGY

## 2021-11-11 PROCEDURE — 99396 PREV VISIT EST AGE 40-64: CPT | Mod: S$GLB,,, | Performed by: OBSTETRICS & GYNECOLOGY

## 2021-11-11 PROCEDURE — 88175 CYTOPATH C/V AUTO FLUID REDO: CPT | Performed by: OBSTETRICS & GYNECOLOGY

## 2021-11-11 PROCEDURE — 99396 PR PREVENTIVE VISIT,EST,40-64: ICD-10-PCS | Mod: S$GLB,,, | Performed by: OBSTETRICS & GYNECOLOGY

## 2021-11-11 PROCEDURE — 99999 PR PBB SHADOW E&M-EST. PATIENT-LVL III: ICD-10-PCS | Mod: PBBFAC,,, | Performed by: OBSTETRICS & GYNECOLOGY

## 2021-11-11 PROCEDURE — 99999 PR PBB SHADOW E&M-EST. PATIENT-LVL III: CPT | Mod: PBBFAC,,, | Performed by: OBSTETRICS & GYNECOLOGY

## 2021-11-11 RX ORDER — FLUTICASONE PROPIONATE 50 MCG
SPRAY, SUSPENSION (ML) NASAL
COMMUNITY
End: 2022-02-26

## 2021-11-19 LAB
FINAL PATHOLOGIC DIAGNOSIS: NORMAL
HPV HR 12 DNA SPEC QL NAA+PROBE: POSITIVE
HPV16 AG SPEC QL: NEGATIVE
HPV18 DNA SPEC QL NAA+PROBE: NEGATIVE
Lab: NORMAL

## 2021-12-01 ENCOUNTER — HOSPITAL ENCOUNTER (OUTPATIENT)
Dept: RADIOLOGY | Facility: HOSPITAL | Age: 61
Discharge: HOME | End: 2021-12-01
Attending: OBSTETRICS & GYNECOLOGY
Payer: COMMERCIAL

## 2021-12-01 DIAGNOSIS — Z12.31 ENCOUNTER FOR SCREENING MAMMOGRAM FOR MALIGNANT NEOPLASM OF BREAST: ICD-10-CM

## 2021-12-01 PROCEDURE — 77063 MAMMO DIGITAL SCREENING BILAT WITH TOMO: ICD-10-PCS | Mod: 26,,, | Performed by: RADIOLOGY

## 2021-12-01 PROCEDURE — 77067 SCR MAMMO BI INCL CAD: CPT | Mod: 26,,, | Performed by: RADIOLOGY

## 2021-12-01 PROCEDURE — 77063 BREAST TOMOSYNTHESIS BI: CPT | Mod: 26,,, | Performed by: RADIOLOGY

## 2021-12-01 PROCEDURE — 77067 MAMMO DIGITAL SCREENING BILAT WITH TOMO: ICD-10-PCS | Mod: 26,,, | Performed by: RADIOLOGY

## 2021-12-01 PROCEDURE — 77067 SCR MAMMO BI INCL CAD: CPT | Mod: TC,PO

## 2021-12-29 ENCOUNTER — TELEPHONE (OUTPATIENT)
Dept: FAMILY MEDICINE | Facility: CLINIC | Age: 61
End: 2021-12-29
Payer: COMMERCIAL

## 2022-01-03 ENCOUNTER — OFFICE VISIT (OUTPATIENT)
Dept: FAMILY MEDICINE | Facility: CLINIC | Age: 62
End: 2022-01-03
Payer: COMMERCIAL

## 2022-01-03 VITALS
SYSTOLIC BLOOD PRESSURE: 116 MMHG | HEART RATE: 82 BPM | HEIGHT: 63 IN | BODY MASS INDEX: 26.13 KG/M2 | TEMPERATURE: 98 F | RESPIRATION RATE: 20 BRPM | OXYGEN SATURATION: 99 % | DIASTOLIC BLOOD PRESSURE: 72 MMHG | WEIGHT: 147.5 LBS

## 2022-01-03 DIAGNOSIS — R00.0 TACHYCARDIA: Primary | ICD-10-CM

## 2022-01-03 DIAGNOSIS — F41.9 ANXIETY: ICD-10-CM

## 2022-01-03 DIAGNOSIS — R00.2 PALPITATIONS: ICD-10-CM

## 2022-01-03 PROCEDURE — 99214 OFFICE O/P EST MOD 30 MIN: CPT | Mod: S$GLB,,, | Performed by: NURSE PRACTITIONER

## 2022-01-03 PROCEDURE — 99214 PR OFFICE/OUTPT VISIT, EST, LEVL IV, 30-39 MIN: ICD-10-PCS | Mod: S$GLB,,, | Performed by: NURSE PRACTITIONER

## 2022-01-03 RX ORDER — ALPRAZOLAM 0.25 MG/1
0.25 TABLET ORAL 2 TIMES DAILY PRN
Qty: 40 TABLET | Refills: 0 | Status: SHIPPED | OUTPATIENT
Start: 2022-01-03 | End: 2023-02-01 | Stop reason: SDUPTHER

## 2022-01-03 NOTE — PROGRESS NOTES
"Subjective:       Patient ID: Renate Vang is a 61 y.o. female.    Chief Complaint: Palpitations  the pt is here to discuss palpitations, heart racing.   Pt states that she is high strung  Pt has had anxiety in the past.   Pt has taken many SSRIs as well as wellbutrin without relief.   She was seen by Dr Dudley, for annual in July 2020, EKG done and normal.   She denies CP/Palpitaitons/SOB.  No depression/SI      HPI  Review of Systems   Constitutional: Negative for appetite change, chills and fever.   HENT: Negative for ear pain and postnasal drip.    Eyes: Negative for pain and itching.   Respiratory: Negative for chest tightness and shortness of breath.    Cardiovascular: Positive for palpitations. Negative for chest pain and leg swelling.   Gastrointestinal: Negative for abdominal distention and abdominal pain.   Endocrine: Negative for polydipsia and polyuria.   Genitourinary: Negative for difficulty urinating and flank pain.   Skin: Negative for color change and pallor.   Neurological: Negative for light-headedness and headaches.   Hematological: Negative for adenopathy. Does not bruise/bleed easily.   Psychiatric/Behavioral: Negative for agitation.       Past medical, surgical, family and social history reviewed.  Objective:     Vitals:    01/03/22 1425   BP: 116/72   Pulse: 82   Resp: 20   Temp: 98.2 °F (36.8 °C)   SpO2: 99%   Weight: 66.9 kg (147 lb 7.8 oz)   Height: 5' 3" (1.6 m)   PainSc: 0-No pain     Body mass index is 26.13 kg/m².     Physical Exam  Constitutional:       Appearance: She is well-developed.   HENT:      Head: Normocephalic and atraumatic.      Right Ear: External ear normal.      Left Ear: External ear normal.      Nose: Nose normal.   Eyes:      General: No scleral icterus.        Right eye: No discharge.         Left eye: No discharge.      Conjunctiva/sclera: Conjunctivae normal.   Neck:      Trachea: No tracheal deviation.   Cardiovascular:      Rate and Rhythm: Normal rate and " regular rhythm.      Heart sounds: Normal heart sounds. No murmur heard.  No friction rub.   Pulmonary:      Effort: Pulmonary effort is normal. No respiratory distress.      Breath sounds: Normal breath sounds. No stridor. No wheezing or rales.   Chest:      Chest wall: No tenderness.   Musculoskeletal:         General: Normal range of motion.      Cervical back: Normal range of motion and neck supple.   Lymphadenopathy:      Cervical: No cervical adenopathy.   Skin:     General: Skin is warm and dry.   Neurological:      Mental Status: She is alert and oriented to person, place, and time.         Assessment:       1. Tachycardia    2. Palpitations    3. Anxiety        Plan:       Renate was seen today for palpitations.    Diagnoses and all orders for this visit:    Tachycardia/Palpitations  -     Echo; Future  -     Holter monitor - 48 hour; Future        Anxiety  -     ALPRAZolam (XANAX) 0.25 MG tablet; Take 1 tablet (0.25 mg total) by mouth 2 (two) times daily as needed for Anxiety.     I do think it her her symptoms may be related to anxiety.  I have given her a very low dose of Xanax and she will use extremely sparingly for panic situations.  We will get an echo and Holter cream to be sure.  The patient understands the addiction/tolerance risk with the use of benzodiazepines and she does understand that we will not use these routinely/long term.      I spent 30 minutes on this encounter, time includes face-to-face, chart review, documentation, test review and orders.

## 2022-01-25 ENCOUNTER — PATIENT MESSAGE (OUTPATIENT)
Dept: OBSTETRICS AND GYNECOLOGY | Facility: CLINIC | Age: 62
End: 2022-01-25
Payer: COMMERCIAL

## 2022-01-25 RX ORDER — TINIDAZOLE 500 MG/1
2 TABLET ORAL DAILY
Qty: 8 TABLET | Refills: 0 | Status: SHIPPED | OUTPATIENT
Start: 2022-01-25 | End: 2022-02-26

## 2022-02-10 NOTE — PROGRESS NOTES
"  Subjective:       Patient ID:  Renate Vang is a 57 y.o. female who presents for   Chief Complaint   Patient presents with    Skin Check    Lesion     58 yo F presents for initial skin check.  Last OV 2014 with Dr Mcdowell.  She has multiple complaints:  1.  Rough spot on R upper lip. She has been told that she bites her lip.  No prior treatments  2.  Rough spot on R chest that gets "inflamed" in the sun, bleeds occasionally, no prior treatments   3.  New brown spot in R axilla, asymptomatic, no prior treatments  4. Dark spot on R cheek, asymptomatic, has used creams in the past without much improvement    Derm Hx: BCC on nose (Mohs) and right axilla (Dr Mcdowell)        Past Medical History:   Diagnosis Date    Abnormal Pap smear of cervix     Anxiety     Basal cell carcinoma 3/2011    right axilla    Breast cancer 2003    right    Depression     History of breast cancer 2003    at 42 s/p surg and radiation T1N0M0 ER+    History of HPV infection     LOW RISK    History of parathyroidectomy     R lower gland removed due to adenoma    Kidney stones     Osteoporosis 2016    PONV (postoperative nausea and vomiting)     Skin cancer     BCC     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) 2012     Review of Systems   Skin: Positive for daily sunscreen use and activity-related sunscreen use. Negative for itching, rash, tendency to form keloidal scars and recent sunburn.   Hematologic/Lymphatic: Does not bruise/bleed easily.   Allergic/Immunologic: Positive for environmental allergies.        Objective:    Physical Exam   Constitutional: She appears well-developed and well-nourished.   Neurological: She is alert and oriented to person, place, and time.   Psychiatric: She has a normal mood and affect.   Skin:   Areas Examined (abnormalities noted in diagram):   Scalp / Hair Palpated and Inspected  Head / Face Inspection Performed  Neck Inspection Performed  Chest / Axilla Inspection " MRI screening completed.   Performed  Abdomen Inspection Performed  Genitals / Buttocks / Groin Inspection Performed  Back Inspection Performed  RUE Inspected  LUE Inspection Performed  RLE Inspected  LLE Inspection Performed  Nails and Digits Inspection Performed                       Diagram Legend     Erythematous scaling macule/papule c/w actinic keratosis       Vascular papule c/w angioma      Pigmented verrucoid papule/plaque c/w seborrheic keratosis      Yellow umbilicated papule c/w sebaceous hyperplasia      Irregularly shaped tan macule c/w lentigo     1-2 mm smooth white papules consistent with Milia      Movable subcutaneous cyst with punctum c/w epidermal inclusion cyst      Subcutaneous movable cyst c/w pilar cyst      Firm pink to brown papule c/w dermatofibroma      Pedunculated fleshy papule(s) c/w skin tag(s)      Evenly pigmented macule c/w junctional nevus     Mildly variegated pigmented, slightly irregular-bordered macule c/w mildly atypical nevus      Flesh colored to evenly pigmented papule c/w intradermal nevus       Pink pearly papule/plaque c/w basal cell carcinoma      Erythematous hyperkeratotic cursted plaque c/w SCC      Surgical scar with no sign of skin cancer recurrence      Open and closed comedones      Inflammatory papules and pustules      Verrucoid papule consistent consistent with wart     Erythematous eczematous patches and plaques     Dystrophic onycholytic nail with subungual debris c/w onychomycosis     Umbilicated papule    Erythematous-base heme-crusted tan verrucoid plaque consistent with inflamed seborrheic keratosis     Erythematous Silvery Scaling Plaque c/w Psoriasis     See annotation      Assessment / Plan:      Pathology Orders:     Normal Orders This Visit    Tissue Specimen To Pathology, Dermatology     Questions:    Directional Terms:  Other(comment)    Clinical information:  r/o BCC vs BLK vs other    Specific Site:  R chest        Actinic keratosis/cheilitis vs trauma from biting upper  lip?  -     fluorouracil (EFUDEX) 5 % cream; Apply topically 2 (two) times daily.  Dispense: 40 g; Refill: 1    Neoplasm of uncertain behavior of skin  -     Tissue Specimen To Pathology, Dermatology  Shave biopsy procedure note:    Shave biopsy performed after verbal consent including risk of infection, scar, recurrence, need for additional treatment of site. Area prepped with alcohol, anesthetized with approximately 1.0cc of 1% lidocaine with epinephrine. Lesional tissue shaved with razor blade. Hemostasis achieved with application of aluminum chloride followed by hyfrecation. No complications. Dressing applied. Wound care explained.    Discussed Efudex if AK or SCCIS    Angioma/Telangiectasia  This is a benign vascular lesion. Reassurance given. No treatment required.     Lentigines  This is a benign hyperpigmented sun induced lesion. Daily sun protection will reduce the number of new lesions. Treatment of these benign lesions are considered cosmetic.    Dermatofibroma  Reassurance that this is a benign collection of scar tissue and it is commonly located on the legs    Milium  Reassurance    Seborrheic keratoses  These are benign inherited growths without a malignant potential. Reassurance given to patient. No treatment is necessary    Benign nevus   Reassurance  Continue daily sun protection while outdoors    Personal h/o NMSC  Area of previous skin cancers examined. Site well healed with no signs of recurrence.  Skin examination performed today including at least 12 points as noted in physical examination. No lesions suspicious for malignancy noted.           Follow-up for pending Pathology.

## 2022-02-15 ENCOUNTER — PATIENT MESSAGE (OUTPATIENT)
Dept: FAMILY MEDICINE | Facility: CLINIC | Age: 62
End: 2022-02-15
Payer: COMMERCIAL

## 2022-02-16 ENCOUNTER — PATIENT MESSAGE (OUTPATIENT)
Dept: FAMILY MEDICINE | Facility: CLINIC | Age: 62
End: 2022-02-16
Payer: COMMERCIAL

## 2022-02-16 RX ORDER — VENLAFAXINE HYDROCHLORIDE 37.5 MG/1
37.5 CAPSULE, EXTENDED RELEASE ORAL DAILY
Qty: 90 CAPSULE | Refills: 3 | Status: SHIPPED | OUTPATIENT
Start: 2022-02-16 | End: 2022-07-07

## 2022-02-18 ENCOUNTER — PATIENT MESSAGE (OUTPATIENT)
Dept: FAMILY MEDICINE | Facility: CLINIC | Age: 62
End: 2022-02-18
Payer: COMMERCIAL

## 2022-02-21 NOTE — TELEPHONE ENCOUNTER
----- Message from Roni Hardy MA sent at 2/21/2022  7:40 AM CST -----  This patient is scheduled for a Holter and an Echo tomorrow. The patient Holter is scheduled before the echo which we do not place Holters prior to Echos as stated on the schedule. This patient will need to be rescheduled. If you have any question My Xt is [phone]. Thanks

## 2022-02-26 ENCOUNTER — OFFICE VISIT (OUTPATIENT)
Dept: URGENT CARE | Facility: CLINIC | Age: 62
End: 2022-02-26
Payer: COMMERCIAL

## 2022-02-26 VITALS
OXYGEN SATURATION: 99 % | SYSTOLIC BLOOD PRESSURE: 124 MMHG | TEMPERATURE: 98 F | DIASTOLIC BLOOD PRESSURE: 68 MMHG | WEIGHT: 147 LBS | HEIGHT: 63 IN | BODY MASS INDEX: 26.05 KG/M2 | RESPIRATION RATE: 18 BRPM | HEART RATE: 89 BPM

## 2022-02-26 DIAGNOSIS — R00.2 PALPITATIONS: Primary | ICD-10-CM

## 2022-02-26 PROCEDURE — 93010 ELECTROCARDIOGRAM REPORT: CPT | Mod: S$GLB,,, | Performed by: INTERNAL MEDICINE

## 2022-02-26 PROCEDURE — 99213 PR OFFICE/OUTPT VISIT, EST, LEVL III, 20-29 MIN: ICD-10-PCS | Mod: S$GLB,,, | Performed by: PHYSICIAN ASSISTANT

## 2022-02-26 PROCEDURE — 93005 ELECTROCARDIOGRAM TRACING: CPT | Mod: S$GLB,,, | Performed by: PHYSICIAN ASSISTANT

## 2022-02-26 PROCEDURE — 93005 EKG 12-LEAD: ICD-10-PCS | Mod: S$GLB,,, | Performed by: PHYSICIAN ASSISTANT

## 2022-02-26 PROCEDURE — 93010 EKG 12-LEAD: ICD-10-PCS | Mod: S$GLB,,, | Performed by: INTERNAL MEDICINE

## 2022-02-26 PROCEDURE — 99213 OFFICE O/P EST LOW 20 MIN: CPT | Mod: S$GLB,,, | Performed by: PHYSICIAN ASSISTANT

## 2022-02-26 RX ORDER — GUAIFENESIN 600 MG/1
TABLET, EXTENDED RELEASE ORAL
COMMUNITY
End: 2022-07-07

## 2022-02-26 NOTE — PROGRESS NOTES
"Subjective:       Patient ID: Renate Vang is a 61 y.o. female.    Vitals:  height is 5' 3" (1.6 m) and weight is 66.7 kg (147 lb). Her temperature is 98.1 °F (36.7 °C). Her blood pressure is 124/68 and her pulse is 89. Her respiration is 18 and oxygen saturation is 99%.     Chief Complaint: Palpitations    Pt presents to urgent care with heart palpitations that has been going on for a while that she thinks is due to anxiety     Palpitations   This is a new problem. Episode onset: two months. The problem occurs intermittently. The problem has been gradually worsening. Nothing aggravates the symptoms. Associated symptoms include an irregular heartbeat. Pertinent negatives include no chest pain, diaphoresis, dizziness, nausea or vomiting. She has tried nothing for the symptoms. Risk factors include stress and family history. Her past medical history is significant for anxiety.       Constitution: Negative for sweating.   Cardiovascular: Positive for palpitations. Negative for chest pain.   Gastrointestinal: Negative for nausea and vomiting.   Neurological: Positive for light-headedness. Negative for dizziness, passing out and loss of consciousness.       Objective:      Physical Exam   Constitutional: She does not appear ill. No distress.   HENT:   Head: Normocephalic and atraumatic.   Ears:   Right Ear: External ear normal.   Left Ear: External ear normal.   Eyes: Conjunctivae are normal. Pupils are equal, round, and reactive to light. Right eye exhibits no discharge. Left eye exhibits no discharge.      extraocular movement intact   Cardiovascular: Normal rate, regular rhythm and normal heart sounds.   No murmur heard.  Pulmonary/Chest: Effort normal and breath sounds normal. She has no wheezes. She has no rhonchi. She has no rales.   Abdominal: Normal appearance. She exhibits no distension and no mass. Soft.   Musculoskeletal: Normal range of motion.         General: Normal range of motion.   Neurological: no " focal deficit. She is alert. No cranial nerve deficit. She has a normal Finger-Nose-Finger Test. Coordination: Heel to shin test normal. Coordination normal.   Skin: Skin is warm, dry and not pale. jaundice  Psychiatric: Mood, judgment and thought content normal.   Nursing note and vitals reviewed.        Assessment:       1. Palpitations          Plan:         Palpitations  -     IN OFFICE EKG 12-LEAD (to Muse)    The EKG shows a normal, regular Sinus Rhythm, at a rate of 76, there are not ST Changes. There is a previous EKG for comparison. This EKG was interpreted by me.     Patient has echo and holter scheduled for next month. Normal echo 8/21.  Discussed if begins having any chest pains of new symptoms to ED.  Continue meds prescribed.    Palpitations Discharge Instructions   About this topic   Sometimes you may have an awareness of your heart beating. This may feel like your heart is beating too fast, is beating hard, or seems to skip a beat. You may also feel lightheaded, dizzy or very tired. Some palpitations go away after a short time and are not serious. Other times they can be a sign of a more serious problem.  The doctors may not be able to tell if something serious is causing your palpitations the first time they see you. It is important to follow up with your doctor.       What care is needed at home?   · Ask your doctor what you need to do when you go home. Make sure you ask questions if you do not understand what the doctor says.  · If you have any of these conditions follow your regular doctors orders about ways to keep your blood pressure, cholesterol, or high blood sugar (diabetes) under control.  · Take all your medicines as ordered.  · If you smoke, try to quit. Your doctor or nurse can help.  · Avoid or limit alcohol and caffeine.  · Ask if you need to learn how to check your pulse.  What follow-up care is needed?   Your doctor may ask you to make visits to the office to check on your progress.  Be sure to keep these visits.  What drugs may be needed?   The doctor may order drugs to:  · Keep your heartbeat normal and steady  Will physical activity be limited?   Ask your doctor what activities are good for you. Exercise is good for your overall health.  What problems could happen?   · Heart failure  · Chest pain  · Heart attack  · Stroke  What can be done to prevent this health problem?   · Keep a healthy weight. If you are overweight, lose weight.  · Exercise more often. This will improve your body's blood flow.  · Stay away from drugs that make the heartbeat faster, like those used for colds and cough.  · Do not use street drugs like cocaine or methamphetamines.  When do I need to call the doctor?   Activate the emergency medical system right away if you have signs of a heart attack or stroke. Call 911 in the [place] or [place]. The sooner treatment begins, the better your chances for recovery. Call for emergency help right away if:  · You have signs of a heart attack, which may include:  ? Severe chest pain, pressure, or discomfort with:  § Breathing trouble; sweating; upset stomach; or cold, clammy skin.  § Pain in your arms, back, or jaw.  § Worse pain with activity like walking up stairs.  § Fast or irregular heartbeat.  § Feeling dizzy, faint, or weak.  ? You have signs of a stroke like sudden:  § Numbness or weakness of the face, arm, or leg, especially on one side of the body.  § Confusion, trouble speaking or understanding.  § Trouble seeing in one or both eyes.  § Trouble walking, dizziness, loss of balance or coordination.  § Severe headache with no known cause.  ? You pass out.  ? You continue to have episodes when it feels like your heart is beating too hard or fast, or seems to skip beats.  ? You are more tired than normal or have more trouble breathing with activity.  Teach Back: Helping You Understand   The Teach Back Method helps you understand the information we are giving you. After  you talk with the staff, tell them in your own words what you learned. This helps to make sure the staff has described each thing clearly. It also helps to explain things that may have been confusing. Before going home, make sure you can do these:  · I can tell you about my condition.  · I can tell you what may help prevent this problem.  · I can tell you what I will do if I have signs of a heart attack or stroke.  Where can I learn more?   American Academy of Family Physicians  http://familydoctor.org/familydoctor/en/diseases-conditions/heart-palpitations/orphac-uodq-dusgrmm.html   Better Health Channel  https://www.betterhealth.[place].gov.[place]/health/conditionsandtreatments/heart-arrhythmias-and-palpitations   NHS Choices  http://www.nhs.uk/conditions/Heart-palpitations/Pages/Introduction.aspx   Last Reviewed Date   2021-06-21  Consumer Information Use and Disclaimer   This information is not specific medical advice and does not replace information you receive from your health care provider. This is only a brief summary of general information. It does NOT include all information about conditions, illnesses, injuries, tests, procedures, treatments, therapies, discharge instructions or life-style choices that may apply to you. You must talk with your health care provider for complete information about your health and treatment options. This information should not be used to decide whether or not to accept your health care providers advice, instructions or recommendations. Only your health care provider has the knowledge and training to provide advice that is right for you.  Copyright   Copyright © 2021 UpToDate, Inc. and its affiliates and/or licensors. All rights reserved.

## 2022-03-20 ENCOUNTER — PATIENT MESSAGE (OUTPATIENT)
Dept: FAMILY MEDICINE | Facility: CLINIC | Age: 62
End: 2022-03-20
Payer: COMMERCIAL

## 2022-03-25 ENCOUNTER — PATIENT MESSAGE (OUTPATIENT)
Dept: FAMILY MEDICINE | Facility: CLINIC | Age: 62
End: 2022-03-25
Payer: COMMERCIAL

## 2022-03-28 ENCOUNTER — TELEPHONE (OUTPATIENT)
Dept: CARDIOLOGY | Facility: CLINIC | Age: 62
End: 2022-03-28
Payer: COMMERCIAL

## 2022-04-06 ENCOUNTER — CLINICAL SUPPORT (OUTPATIENT)
Dept: CARDIOLOGY | Facility: HOSPITAL | Age: 62
End: 2022-04-06
Attending: NURSE PRACTITIONER
Payer: COMMERCIAL

## 2022-04-06 DIAGNOSIS — R00.0 TACHYCARDIA: ICD-10-CM

## 2022-04-06 PROCEDURE — 93227 XTRNL ECG REC<48 HR R&I: CPT | Mod: ,,, | Performed by: INTERNAL MEDICINE

## 2022-04-06 PROCEDURE — 93225 XTRNL ECG REC<48 HRS REC: CPT | Mod: PO

## 2022-04-06 PROCEDURE — 93227 HOLTER MONITOR - 48 HOUR (CUPID ONLY): ICD-10-PCS | Mod: ,,, | Performed by: INTERNAL MEDICINE

## 2022-04-13 LAB
OHS CV EVENT MONITOR DAY: 0
OHS CV HOLTER LENGTH DECIMAL HOURS: 48
OHS CV HOLTER LENGTH HOURS: 48
OHS CV HOLTER LENGTH MINUTES: 0
OHS CV HOLTER SINUS AVERAGE HR: 77
OHS CV HOLTER SINUS MAX HR: 114
OHS CV HOLTER SINUS MIN HR: 58

## 2022-04-14 ENCOUNTER — PATIENT MESSAGE (OUTPATIENT)
Dept: FAMILY MEDICINE | Facility: CLINIC | Age: 62
End: 2022-04-14
Payer: COMMERCIAL

## 2022-04-22 ENCOUNTER — PATIENT MESSAGE (OUTPATIENT)
Dept: CARDIOLOGY | Facility: CLINIC | Age: 62
End: 2022-04-22
Payer: COMMERCIAL

## 2022-04-25 ENCOUNTER — OFFICE VISIT (OUTPATIENT)
Dept: CARDIOLOGY | Facility: CLINIC | Age: 62
End: 2022-04-25
Payer: COMMERCIAL

## 2022-04-25 VITALS
DIASTOLIC BLOOD PRESSURE: 79 MMHG | SYSTOLIC BLOOD PRESSURE: 116 MMHG | HEIGHT: 63 IN | WEIGHT: 150.81 LBS | BODY MASS INDEX: 26.72 KG/M2 | HEART RATE: 92 BPM

## 2022-04-25 DIAGNOSIS — I49.1 PAC (PREMATURE ATRIAL CONTRACTION): Primary | ICD-10-CM

## 2022-04-25 PROCEDURE — 99204 PR OFFICE/OUTPT VISIT, NEW, LEVL IV, 45-59 MIN: ICD-10-PCS | Mod: S$GLB,,, | Performed by: INTERNAL MEDICINE

## 2022-04-25 PROCEDURE — 99999 PR PBB SHADOW E&M-EST. PATIENT-LVL III: CPT | Mod: PBBFAC,,, | Performed by: INTERNAL MEDICINE

## 2022-04-25 PROCEDURE — 99204 OFFICE O/P NEW MOD 45 MIN: CPT | Mod: S$GLB,,, | Performed by: INTERNAL MEDICINE

## 2022-04-25 PROCEDURE — 99999 PR PBB SHADOW E&M-EST. PATIENT-LVL III: ICD-10-PCS | Mod: PBBFAC,,, | Performed by: INTERNAL MEDICINE

## 2022-04-25 RX ORDER — METOPROLOL SUCCINATE 25 MG/1
25 TABLET, EXTENDED RELEASE ORAL DAILY
Qty: 90 TABLET | Refills: 3 | Status: SHIPPED | OUTPATIENT
Start: 2022-04-25 | End: 2022-06-20 | Stop reason: DRUGHIGH

## 2022-04-25 NOTE — PROGRESS NOTES
Subjective:    Patient ID:  Renate Vang is a 61 y.o. female who presents for evaluation of No chief complaint on file.      Problem List Items Addressed This Visit    None     Visit Diagnoses     PAC (premature atrial contraction)    -  Primary          HPI    Here to establish care    Had recent Holter monitor that showed symptomatic PACs    The patient states that she was having issues with nonstop palpitations. Worse with Effexor which she has weaned off of. Has a lot of personal stressors as well. Feeling better off of Effexor, but still having some symptoms.    No chest pain.  No shortness of breath out of proportion to exam.    Sleep apnea - Not a snorer  Caffeine/stimulant use - Once daily coffee    Personal history of heart attack or stroke - None that she is aware of   Family history of heart disease - Mom with ventricular arrhythmias, sounds like PVCs.     Past Medical History:   Diagnosis Date    Anxiety resolved    Breast cancer     Depression     History of abnormal cervical Pap smear     History of basal cell carcinoma 2011    right axilla    History of breast cancer     at 42 s/p surg and radiation T1N0M0 ER+    History of HPV infection     LOW RISK    History of parathyroidectomy     R lower gland removed due to adenoma    Kidney stones     Osteopenia     last dexa - refuses additional testing     PONV (postoperative nausea and vomiting)     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)        Past Surgical History:   Procedure Laterality Date    AUGMENTATION OF BREAST      BREAST BIOPSY      BREAST LUMPECTOMY  2003    x2 Right with senital node bx, with radiation    BREAST RECONSTRUCTION      s/p lumpectomy to replace previous implants bilaterally    BREAST SURGERY      breast augmentation prior to lumpectomy    cataract surgery       SECTION, LOW TRANSVERSE      x2    COLONOSCOPY N/A 2018    Procedure: COLONOSCOPY;  Surgeon: Khanh Forrester Jr.,  MD;  Location: [facility];  Service: Endoscopy;  Laterality: N/A;    COLONOSCOPY W/ POLYPECTOMY  09/20/2013    HERBIE.   One 1 mm polyp at the hepatic flexure. TUBULAR ADENOMATOUS POLYP.  One 1 mm polyp in the cecum. TUBULAR ADENOMATOUS POLYP.  The examination was otherwise normal.      HERNIA REPAIR      bilateral groin     LASER ABLATION OF CONDYLOMAS  2012    MALIGNANT SKIN LESION EXCISION      nose & under right arm    PARATHYROID GLAND SURGERY      R lower gland removed due to adenoma    TONSILLECTOMY         Family History   Problem Relation Age of Onset    Glaucoma Mother     Osteoarthritis Mother     Arrhythmia Mother     Fibromyalgia Mother     Kidney disease Mother     Cancer Maternal Uncle         colon    Hypertension Father     Hashimoto's thyroiditis Sister     Anxiety disorder Sister     Heart disease Maternal Grandmother     COPD Maternal Grandmother     Tuberculosis Maternal Grandmother     Hypertension Maternal Grandmother     COPD Maternal Grandfather     Osteoarthritis Maternal Grandfather     COPD Paternal Grandfather     Breast cancer Cousin     Ovarian cancer Neg Hx        Social History     Socioeconomic History    Marital status:    Occupational History     Employer: [employer]   Tobacco Use    Smoking status: Never Smoker    Smokeless tobacco: Never Used   Substance and Sexual Activity    Alcohol use: Yes     Alcohol/week: 4.0 standard drinks     Types: 4 Glasses of wine per week     Comment: 2 drinks three times per week    Drug use: Yes     Types: Benzodiazepines    Sexual activity: Yes     Partners: Male     Birth control/protection: Post-menopausal   Other Topics Concern    Are you pregnant or think you may be? No    Breast-feeding No     Social Determinants of Health     Financial Resource Strain: Low Risk     Difficulty of Paying Living Expenses: Not hard at all   Food Insecurity: No Food Insecurity    Worried About Running Out of Food  in the Last Year: Never true    Ran Out of Food in the Last Year: Never true   Transportation Needs: No Transportation Needs    Lack of Transportation (Medical): No    Lack of Transportation (Non-Medical): No   Physical Activity: Insufficiently Active    Days of Exercise per Week: 1 day    Minutes of Exercise per Session: 30 min   Stress: Unknown    Feeling of Stress : Patient refused   Social Connections: Unknown    Frequency of Communication with Friends and Family: More than three times a week    Frequency of Social Gatherings with Friends and Family: Twice a week    Active Member of Clubs or Organizations: Yes    Attends Club or Organization Meetings: More than 4 times per year    Marital Status:    Housing Stability: Low Risk     Unable to Pay for Housing in the Last Year: No    Number of Places Lived in the Last Year: 1    Unstable Housing in the Last Year: No       Review of patient's allergies indicates:   Allergen Reactions    Adhesive Rash     Blisters with band-aids and surgical dressing       Review of Systems   Constitutional: Negative for decreased appetite, fever and malaise/fatigue.   Eyes: Negative for blurred vision.   Cardiovascular: Negative for chest pain, dyspnea on exertion, irregular heartbeat and leg swelling.   Respiratory: Negative for cough, hemoptysis, shortness of breath and wheezing.    Endocrine: Negative for cold intolerance and heat intolerance.   Hematologic/Lymphatic: Negative for bleeding problem.   Musculoskeletal: Negative for muscle weakness and myalgias.   Gastrointestinal: Negative for abdominal pain, constipation and diarrhea.   Genitourinary: Negative for bladder incontinence.   Neurological: Negative for dizziness and weakness.   Psychiatric/Behavioral: Negative for depression.        Objective:     Vitals:    04/25/22 1359   BP: 116/79   BP Location: Left arm   Patient Position: Sitting   BP Method: Medium (Automatic)   Pulse: 92   Weight: 68.4 kg  "(150 lb 12.7 oz)   Height: 5' 3" (1.6 m)        Physical Exam  Vitals and nursing note reviewed.   Constitutional:       General: She is not in acute distress.     Appearance: She is well-developed.   HENT:      Head: Normocephalic and atraumatic.   Neck:      Vascular: No JVD.   Cardiovascular:      Rate and Rhythm: Normal rate and regular rhythm.      Heart sounds: Normal heart sounds. No murmur heard.    No friction rub. No gallop.   Pulmonary:      Effort: Pulmonary effort is normal. No respiratory distress.      Breath sounds: Normal breath sounds. No wheezing or rales.   Abdominal:      General: Bowel sounds are normal.      Palpations: Abdomen is soft.      Tenderness: There is no abdominal tenderness. There is no guarding or rebound.   Musculoskeletal:         General: No tenderness.      Cervical back: Neck supple.   Skin:     General: Skin is warm and dry.   Neurological:      Mental Status: She is alert and oriented to person, place, and time.   Psychiatric:         Behavior: Behavior normal.             Current Outpatient Medications on File Prior to Visit   Medication Sig    calcium-vitamin D (OSCAL) 250 (625)-125 mg-unit per tablet Take 1 tablet by mouth once daily.    cetirizine (ZYRTEC) 10 MG tablet Take 10 mg by mouth once daily.    glucosamine-chondroitin 500-400 mg tablet Take 1 tablet by mouth 2 (two) times daily.    Lactobacillus rhamnosus GG (CULTURELLE) 10 billion cell capsule Take 1 capsule by mouth once daily.    RESTASIS 0.05 % ophthalmic emulsion     vit A/vit C/vit E/zinc/copper (ICAPS AREDS ORAL) Take by mouth.    vitamin D (VITAMIN D3) 1000 units Tab Take 600 Units by mouth once daily.     zinc gluconate 50 mg tablet Take 25 mg by mouth once daily.     ALPRAZolam (XANAX) 0.25 MG tablet Take 1 tablet (0.25 mg total) by mouth 2 (two) times daily as needed for Anxiety.    guaiFENesin (MUCINEX) 600 mg 12 hr tablet     venlafaxine (EFFEXOR-XR) 37.5 MG 24 hr capsule Take 1 capsule " (37.5 mg total) by mouth once daily.     No current facility-administered medications on file prior to visit.       Lipid Panel:   Lab Results   Component Value Date    CHOL 230 (H) 07/09/2021    HDL 76 (H) 07/09/2021    LDLCALC 138.2 07/09/2021    TRIG 79 07/09/2021    CHOLHDL 33.0 07/09/2021         The 10-year ASCVD risk score (Richi ELLISON Jr., et al., 2013) is: 3.1%    Values used to calculate the score:      Age: 61 years      Sex: Female      Is Non- : No      Diabetic: No      Tobacco smoker: No      Systolic Blood Pressure: 116 mmHg      Is BP treated: No      HDL Cholesterol: 76 mg/dL      Total Cholesterol: 230 mg/dL    All pertinent labs, imaging, and EKGs reviewed.  Patient's most recent EKG tracing was personally interpreted by this provider.    Assessment:       1. PAC (premature atrial contraction)         Plan:     Symptoms of palpitations with stress  BP/Pulse OK today  Most recent echocardiogram and Holter monitor reviewed personally   Sleep apnea symptoms - No snoring    Start metoprolol succinate 25 mg PO Daily and monitor symptoms    Continue other cardiac medications  Mediterranean Diet/Cardiovascular Exercise Program    Patient queried and all questions were answered.    F/u in 6 months to reassess      Signed:    Anton Salazar MD  4/25/2022 8:26 AM

## 2022-05-13 ENCOUNTER — PATIENT MESSAGE (OUTPATIENT)
Dept: CARDIOLOGY | Facility: CLINIC | Age: 62
End: 2022-05-13
Payer: COMMERCIAL

## 2022-06-13 ENCOUNTER — TELEPHONE (OUTPATIENT)
Dept: FAMILY MEDICINE | Facility: CLINIC | Age: 62
End: 2022-06-13
Payer: COMMERCIAL

## 2022-06-13 NOTE — TELEPHONE ENCOUNTER
----- Message from [facility] sent at 6/13/2022 12:11 PM CDT -----  .Type: Appointment Request     Caller is requesting appointment for annual     Was A Appointment Solution Found When Scheduling? None     Best Call Back Number:  [phone]    Additional Information: None

## 2022-06-20 ENCOUNTER — PATIENT MESSAGE (OUTPATIENT)
Dept: CARDIOLOGY | Facility: CLINIC | Age: 62
End: 2022-06-20
Payer: COMMERCIAL

## 2022-06-20 RX ORDER — PROPRANOLOL HYDROCHLORIDE 60 MG/1
60 CAPSULE, EXTENDED RELEASE ORAL DAILY
Qty: 90 CAPSULE | Refills: 2 | Status: SHIPPED | OUTPATIENT
Start: 2022-06-20 | End: 2022-09-23

## 2022-07-07 ENCOUNTER — OFFICE VISIT (OUTPATIENT)
Dept: FAMILY MEDICINE | Facility: CLINIC | Age: 62
End: 2022-07-07
Payer: COMMERCIAL

## 2022-07-07 VITALS
OXYGEN SATURATION: 97 % | BODY MASS INDEX: 25.91 KG/M2 | RESPIRATION RATE: 20 BRPM | DIASTOLIC BLOOD PRESSURE: 68 MMHG | HEART RATE: 64 BPM | HEIGHT: 63 IN | TEMPERATURE: 99 F | WEIGHT: 146.25 LBS | SYSTOLIC BLOOD PRESSURE: 115 MMHG

## 2022-07-07 DIAGNOSIS — Z00.00 LABORATORY EXAM ORDERED AS PART OF ROUTINE GENERAL MEDICAL EXAMINATION: ICD-10-CM

## 2022-07-07 DIAGNOSIS — F41.9 ANXIETY: Primary | ICD-10-CM

## 2022-07-07 DIAGNOSIS — E55.9 VITAMIN D DEFICIENCY: ICD-10-CM

## 2022-07-07 DIAGNOSIS — M85.80 OSTEOPENIA, UNSPECIFIED LOCATION: ICD-10-CM

## 2022-07-07 DIAGNOSIS — R00.2 PALPITATIONS: ICD-10-CM

## 2022-07-07 LAB
25(OH)D3+25(OH)D2 SERPL-MCNC: 44 NG/ML (ref 30–96)
ALBUMIN SERPL BCP-MCNC: 4.2 G/DL (ref 3.5–5.2)
ALP SERPL-CCNC: 72 U/L (ref 55–135)
ALT SERPL W/O P-5'-P-CCNC: 23 U/L (ref 10–44)
ANION GAP SERPL CALC-SCNC: 6 MMOL/L (ref 8–16)
AST SERPL-CCNC: 21 U/L (ref 10–40)
BASOPHILS # BLD AUTO: 0.06 K/UL (ref 0–0.2)
BASOPHILS NFR BLD: 1 % (ref 0–1.9)
BILIRUB SERPL-MCNC: 0.8 MG/DL (ref 0.1–1)
BUN SERPL-MCNC: 17 MG/DL (ref 8–23)
CALCIUM SERPL-MCNC: 10 MG/DL (ref 8.7–10.5)
CHLORIDE SERPL-SCNC: 101 MMOL/L (ref 95–110)
CHOLEST SERPL-MCNC: 229 MG/DL (ref 120–199)
CHOLEST/HDLC SERPL: 3.5 {RATIO} (ref 2–5)
CO2 SERPL-SCNC: 28 MMOL/L (ref 23–29)
CREAT SERPL-MCNC: 0.7 MG/DL (ref 0.5–1.4)
DIFFERENTIAL METHOD: NORMAL
EOSINOPHIL # BLD AUTO: 0.1 K/UL (ref 0–0.5)
EOSINOPHIL NFR BLD: 2.2 % (ref 0–8)
ERYTHROCYTE [DISTWIDTH] IN BLOOD BY AUTOMATED COUNT: 13.2 % (ref 11.5–14.5)
EST. GFR  (AFRICAN AMERICAN): >60 ML/MIN/1.73 M^2
EST. GFR  (NON AFRICAN AMERICAN): >60 ML/MIN/1.73 M^2
ESTIMATED AVG GLUCOSE: 97 MG/DL (ref 68–131)
GLUCOSE SERPL-MCNC: 93 MG/DL (ref 70–110)
HBA1C MFR BLD: 5 % (ref 4–5.6)
HCT VFR BLD AUTO: 40.9 % (ref 37–48.5)
HDLC SERPL-MCNC: 65 MG/DL (ref 40–75)
HDLC SERPL: 28.4 % (ref 20–50)
HGB BLD-MCNC: 13.5 G/DL (ref 12–16)
IMM GRANULOCYTES # BLD AUTO: 0.01 K/UL (ref 0–0.04)
IMM GRANULOCYTES NFR BLD AUTO: 0.2 % (ref 0–0.5)
LDLC SERPL CALC-MCNC: 136 MG/DL (ref 63–159)
LYMPHOCYTES # BLD AUTO: 2.3 K/UL (ref 1–4.8)
LYMPHOCYTES NFR BLD: 36.7 % (ref 18–48)
MCH RBC QN AUTO: 30.5 PG (ref 27–31)
MCHC RBC AUTO-ENTMCNC: 33 G/DL (ref 32–36)
MCV RBC AUTO: 93 FL (ref 82–98)
MONOCYTES # BLD AUTO: 0.4 K/UL (ref 0.3–1)
MONOCYTES NFR BLD: 5.8 % (ref 4–15)
NEUTROPHILS # BLD AUTO: 3.4 K/UL (ref 1.8–7.7)
NEUTROPHILS NFR BLD: 54.1 % (ref 38–73)
NONHDLC SERPL-MCNC: 164 MG/DL
NRBC BLD-RTO: 0 /100 WBC
PLATELET # BLD AUTO: 267 K/UL (ref 150–450)
PMV BLD AUTO: 10.5 FL (ref 9.2–12.9)
POTASSIUM SERPL-SCNC: 4.4 MMOL/L (ref 3.5–5.1)
PROT SERPL-MCNC: 6.8 G/DL (ref 6–8.4)
RBC # BLD AUTO: 4.42 M/UL (ref 4–5.4)
SODIUM SERPL-SCNC: 135 MMOL/L (ref 136–145)
TRIGL SERPL-MCNC: 140 MG/DL (ref 30–150)
TSH SERPL DL<=0.005 MIU/L-ACNC: 1.23 UIU/ML (ref 0.4–4)
WBC # BLD AUTO: 6.24 K/UL (ref 3.9–12.7)

## 2022-07-07 PROCEDURE — 84443 ASSAY THYROID STIM HORMONE: CPT | Performed by: NURSE PRACTITIONER

## 2022-07-07 PROCEDURE — 80053 COMPREHEN METABOLIC PANEL: CPT | Performed by: NURSE PRACTITIONER

## 2022-07-07 PROCEDURE — 82306 VITAMIN D 25 HYDROXY: CPT | Performed by: NURSE PRACTITIONER

## 2022-07-07 PROCEDURE — 80061 LIPID PANEL: CPT | Performed by: NURSE PRACTITIONER

## 2022-07-07 PROCEDURE — 83036 HEMOGLOBIN GLYCOSYLATED A1C: CPT | Performed by: NURSE PRACTITIONER

## 2022-07-07 PROCEDURE — 36415 COLL VENOUS BLD VENIPUNCTURE: CPT | Mod: S$GLB,,, | Performed by: NURSE PRACTITIONER

## 2022-07-07 PROCEDURE — 99214 OFFICE O/P EST MOD 30 MIN: CPT | Mod: S$GLB,,, | Performed by: NURSE PRACTITIONER

## 2022-07-07 PROCEDURE — 85025 COMPLETE CBC W/AUTO DIFF WBC: CPT | Performed by: NURSE PRACTITIONER

## 2022-07-07 PROCEDURE — 99214 PR OFFICE/OUTPT VISIT, EST, LEVL IV, 30-39 MIN: ICD-10-PCS | Mod: S$GLB,,, | Performed by: NURSE PRACTITIONER

## 2022-07-07 PROCEDURE — 36415 PR COLLECTION VENOUS BLOOD,VENIPUNCTURE: ICD-10-PCS | Mod: S$GLB,,, | Performed by: NURSE PRACTITIONER

## 2022-07-07 NOTE — PROGRESS NOTES
"Subjective:       Patient ID: Renate Vang is a 61 y.o. female.    Chief Complaint: Annual Exam  the pt is here for an annual exam. Pt feels like she is not "herself."  Decreased ability to focus, Decreased motivation, dimished interest. She is not sure that anxiety is causing these issues.   She has tried and failed  lexapro  wellbutrin  effexor    Pt has had palpitations in which she had holter that showed PACs. Pt was seen by cardiology (Dr. Walton)and he was put on propranolol 60 mg daily. She does feel like this medication is making her worse but she is giving it a chance.     HPI  Review of Systems   Constitutional: Negative for appetite change, chills and fever.   HENT: Negative for ear pain and postnasal drip.    Eyes: Negative for pain and itching.   Respiratory: Negative for chest tightness and shortness of breath.    Gastrointestinal: Negative for abdominal distention and abdominal pain.   Endocrine: Negative for polydipsia and polyuria.   Genitourinary: Negative for difficulty urinating and flank pain.   Skin: Negative for color change and pallor.   Neurological: Negative for light-headedness and headaches.   Hematological: Negative for adenopathy. Does not bruise/bleed easily.   Psychiatric/Behavioral: Negative for agitation.       Past medical, surgical, family and social history reviewed.  Objective:     Vitals:    07/07/22 0954   BP: 115/68   Pulse: 64   Resp: 20   Temp: 98.6 °F (37 °C)   SpO2: 97%   Weight: 66.3 kg (146 lb 4.4 oz)   Height: 5' 3" (1.6 m)   PainSc:   5   PainLoc: Arm     Body mass index is 25.91 kg/m².     Physical Exam  Constitutional:       General: She is not in acute distress.     Appearance: She is well-developed. She is not diaphoretic.   HENT:      Head: Normocephalic and atraumatic.      Right Ear: Hearing, tympanic membrane, ear canal and external ear normal.      Left Ear: Hearing, tympanic membrane, ear canal and external ear normal.      Nose: Nose normal.      " Mouth/Throat:      Pharynx: Uvula midline.   Eyes:      General:         Right eye: No discharge.         Left eye: No discharge.      Conjunctiva/sclera: Conjunctivae normal.      Pupils: Pupils are equal, round, and reactive to light.   Neck:      Thyroid: No thyromegaly.      Vascular: No carotid bruit or JVD.      Trachea: Trachea normal.   Cardiovascular:      Rate and Rhythm: Normal rate and regular rhythm.      Heart sounds: No murmur heard.    No friction rub. No gallop.   Pulmonary:      Effort: Pulmonary effort is normal. No respiratory distress.      Breath sounds: Normal breath sounds. No wheezing or rales.   Chest:      Chest wall: No tenderness.   Abdominal:      General: Bowel sounds are normal. There is no distension.      Palpations: Abdomen is soft. There is no mass.      Tenderness: There is no abdominal tenderness. There is no guarding or rebound.   Musculoskeletal:         General: Normal range of motion.      Cervical back: Normal range of motion and neck supple.   Skin:     General: Skin is warm and dry.   Neurological:      Mental Status: She is alert and oriented to person, place, and time.      Coordination: Coordination normal.   Psychiatric:         Behavior: Behavior normal.         Thought Content: Thought content normal.         Judgment: Judgment normal.         Assessment:       1. Anxiety    2. Palpitations    3. Laboratory exam ordered as part of routine general medical examination    4. Vitamin D deficiency    5. Osteopenia, unspecified location        Plan:       Renate was seen today for annual exam.    Diagnoses and all orders for this visit:    Anxiety  Pt will think about starting another medication    Palpitations  Continue inderal and f/u with Dr Waltno    Laboratory exam ordered as part of routine general medical examination  -     CBC Auto Differential  -     Comprehensive Metabolic Panel  -     Hemoglobin A1C  -     Lipid Panel  -     TSH    Vitamin D deficiency  -      Vitamin D    Osteopenia, unspecified location  -     DXA Bone Density Spine And Hip; Future        I spent 30 minutes on this encounter, time includes face-to-face, chart review, documentation, test review and orders.

## 2022-08-03 ENCOUNTER — HOSPITAL ENCOUNTER (OUTPATIENT)
Dept: RADIOLOGY | Facility: HOSPITAL | Age: 62
Discharge: HOME | End: 2022-08-03
Attending: ORTHOPAEDIC SURGERY
Payer: COMMERCIAL

## 2022-08-03 ENCOUNTER — OFFICE VISIT (OUTPATIENT)
Dept: ORTHOPEDICS | Facility: CLINIC | Age: 62
End: 2022-08-03
Payer: COMMERCIAL

## 2022-08-03 VITALS — BODY MASS INDEX: 25.87 KG/M2 | WEIGHT: 146 LBS | HEIGHT: 63 IN

## 2022-08-03 DIAGNOSIS — M79.641 PAIN IN RIGHT HAND: ICD-10-CM

## 2022-08-03 DIAGNOSIS — M79.641 PAIN IN RIGHT HAND: Primary | ICD-10-CM

## 2022-08-03 DIAGNOSIS — G56.90 NEUROPATHY OF UPPER EXTREMITY, UNSPECIFIED LATERALITY: ICD-10-CM

## 2022-08-03 PROCEDURE — 73130 X-RAY EXAM OF HAND: CPT | Mod: 26,RT,, | Performed by: RADIOLOGY

## 2022-08-03 PROCEDURE — 99204 OFFICE O/P NEW MOD 45 MIN: CPT | Mod: S$GLB,,, | Performed by: ORTHOPAEDIC SURGERY

## 2022-08-03 PROCEDURE — 73130 X-RAY EXAM OF HAND: CPT | Mod: TC,PO,RT

## 2022-08-03 PROCEDURE — 99999 PR PBB SHADOW E&M-EST. PATIENT-LVL III: CPT | Mod: PBBFAC,,, | Performed by: ORTHOPAEDIC SURGERY

## 2022-08-03 PROCEDURE — 99204 PR OFFICE/OUTPT VISIT, NEW, LEVL IV, 45-59 MIN: ICD-10-PCS | Mod: S$GLB,,, | Performed by: ORTHOPAEDIC SURGERY

## 2022-08-03 PROCEDURE — 73130 XR HAND COMPLETE 3 VIEW RIGHT: ICD-10-PCS | Mod: 26,RT,, | Performed by: RADIOLOGY

## 2022-08-03 PROCEDURE — 99999 PR PBB SHADOW E&M-EST. PATIENT-LVL III: ICD-10-PCS | Mod: PBBFAC,,, | Performed by: ORTHOPAEDIC SURGERY

## 2022-08-03 NOTE — PROGRESS NOTES
8/3/2022    Chief Complaint:  Chief Complaint   Patient presents with    Left Hand - Pain, Numbness     L index finger    Right Hand - Pain, Numbness     R index finger       HPI:  Renate Vang is a 61 y.o. female, who presents to clinic today she has a history of numbness of her fingertips on both hands but mainly on the right.  There is also some soreness about the knuckles specifically on the right index finger.  She also has a sense of hesitation whenever she is typing to get her index fingers to move.  She is here today for evaluation.  She states that the symptoms have been going on for several months.  She had no injuries prior to their onset.  She has not had any treatment or evaluation up to this point.    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)        PSHX:  Past Surgical History:   Procedure Laterality Date    AUGMENTATION OF BREAST      BREAST BIOPSY      BREAST LUMPECTOMY  2003    x2 Right with senital node bx, with radiation    BREAST RECONSTRUCTION      s/p lumpectomy to replace previous implants bilaterally    BREAST SURGERY      breast augmentation prior to lumpectomy    cataract surgery       SECTION, LOW TRANSVERSE      x2    COLONOSCOPY N/A 2018    Procedure: COLONOSCOPY;  Surgeon: Khanh Forrester Jr., MD;  [facility]  Service: Endoscopy;  Laterality: N/A;    COLONOSCOPY W/ POLYPECTOMY  2013    HERBIE.   One 1 mm polyp  at the hepatic flexure. TUBULAR ADENOMATOUS POLYP.  One 1 mm polyp in the cecum. TUBULAR ADENOMATOUS POLYP.  The examination was otherwise normal.      HERNIA REPAIR      bilateral groin     LASER ABLATION OF CONDYLOMAS  2012    MALIGNANT SKIN LESION EXCISION      nose & under right arm    PARATHYROID GLAND SURGERY      R lower gland removed due to adenoma    TONSILLECTOMY         FMHX:  Family History   Problem Relation Age of Onset    Glaucoma Mother     Osteoarthritis Mother     Arrhythmia Mother     Fibromyalgia Mother     Kidney disease Mother     Cancer Maternal Uncle         colon    Hypertension Father     Hashimoto's thyroiditis Sister     Anxiety disorder Sister     Heart disease Maternal Grandmother     COPD Maternal Grandmother     Tuberculosis Maternal Grandmother     Hypertension Maternal Grandmother     COPD Maternal Grandfather     Osteoarthritis Maternal Grandfather     COPD Paternal Grandfather     Breast cancer Cousin     Ovarian cancer Neg Hx        SOCHX:  Social History     Tobacco Use    Smoking status: Never Smoker    Smokeless tobacco: Never Used   Substance Use Topics    Alcohol use: Yes     Alcohol/week: 4.0 standard drinks     Types: 4 Glasses of wine per week     Comment: 2 drinks three times per week       ALLERGIES:  Adhesive    CURRENT MEDICATIONS:  Current Outpatient Medications on File Prior to Visit   Medication Sig Dispense Refill    calcium-vitamin D (OSCAL) 250 (625)-125 mg-unit per tablet Take 1 tablet by mouth once daily.      cetirizine (ZYRTEC) 10 MG tablet Take 10 mg by mouth once daily.      glucosamine-chondroitin 500-400 mg tablet Take 1 tablet by mouth 2 (two) times daily.      Lactobacillus rhamnosus GG (CULTURELLE) 10 billion cell capsule Take 1 capsule by mouth once daily.      propranoloL (INDERAL LA) 60 MG 24 hr capsule Take 1 capsule (60 mg total) by mouth once daily. 90 capsule 2    RESTASIS 0.05 % ophthalmic emulsion       vit  "A/vit C/vit E/zinc/copper (ICAPS AREDS ORAL) Take by mouth.      vitamin D (VITAMIN D3) 1000 units Tab Take 600 Units by mouth once daily.       zinc gluconate 50 mg tablet Take 25 mg by mouth once daily.       ALPRAZolam (XANAX) 0.25 MG tablet Take 1 tablet (0.25 mg total) by mouth 2 (two) times daily as needed for Anxiety. 40 tablet 0     No current facility-administered medications on file prior to visit.       REVIEW OF SYSTEMS:  Review of Systems   Constitutional: Negative.    HENT: Positive for ear pain.    Eyes: Negative.    Respiratory: Negative.    Cardiovascular: Positive for palpitations.   Gastrointestinal: Negative.    Genitourinary: Negative.    Musculoskeletal: Positive for joint pain.   Skin: Negative.    Neurological: Positive for weakness.   Endo/Heme/Allergies: Negative.    Psychiatric/Behavioral: The patient is nervous/anxious.        GENERAL PHYSICAL EXAM:   Ht 5' 3" (1.6 m)   Wt 66.2 kg (146 lb)   LMP 09/20/2006   BMI 25.86 kg/m²    GEN: well developed, well nourished, no acute distress   HENT: Normocephalic, atraumatic   EYES: No discharge, conjunctiva normal   NECK: Supple, non-tender   PULM: No wheezing, no respiratory distress   CV: RRR   ABD: Soft, non-tender    ORTHO EXAM:   Examination of bilateral hands reveals that there is no edema.  There are no major skin changes.  Sensation is intact in the median radial ulnar distributions bilaterally.  She does have mild tenderness to palpation over the index finger MCP joint with a small amount of enlargement of that joint.  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  She has negative Tinel's and negative Durkan's tests bilaterally.  She has 5/5 thenar and intrinsic muscular strength.  She is able make full composite fist and fully extend the fingers.    RADIOLOGY:   X-rays of the right hand were taken in clinic today.  The films reviewed by me.  There are mild degenerative changes noted.  There " are no specific areas of pathology.    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 Will send the patient for EMG and nerve conduction study to assess for neuropathy versus compressive neuropathy    2. She will follow up with me after the nerve conduction study.  At that point will discuss any further treatment options versus  2.

## 2022-08-08 ENCOUNTER — PATIENT MESSAGE (OUTPATIENT)
Dept: ORTHOPEDICS | Facility: CLINIC | Age: 62
End: 2022-08-08
Payer: COMMERCIAL

## 2022-08-09 ENCOUNTER — TELEPHONE (OUTPATIENT)
Dept: ORTHOPEDICS | Facility: CLINIC | Age: 62
End: 2022-08-09
Payer: COMMERCIAL

## 2022-08-09 ENCOUNTER — PATIENT MESSAGE (OUTPATIENT)
Dept: ORTHOPEDICS | Facility: CLINIC | Age: 62
End: 2022-08-09
Payer: COMMERCIAL

## 2022-08-09 DIAGNOSIS — M79.641 PAIN IN RIGHT HAND: Primary | ICD-10-CM

## 2022-08-09 RX ORDER — MELOXICAM 15 MG/1
15 TABLET ORAL DAILY
Qty: 28 TABLET | Refills: 0 | Status: SHIPPED | OUTPATIENT
Start: 2022-08-09 | End: 2022-10-12

## 2022-09-20 DIAGNOSIS — G56.90 NEUROPATHY OF UPPER EXTREMITY, UNSPECIFIED LATERALITY: Primary | ICD-10-CM

## 2022-09-22 ENCOUNTER — PATIENT MESSAGE (OUTPATIENT)
Dept: CARDIOLOGY | Facility: CLINIC | Age: 62
End: 2022-09-22
Payer: COMMERCIAL

## 2022-09-22 ENCOUNTER — TELEPHONE (OUTPATIENT)
Dept: FAMILY MEDICINE | Facility: CLINIC | Age: 62
End: 2022-09-22
Payer: COMMERCIAL

## 2022-09-22 ENCOUNTER — OFFICE VISIT (OUTPATIENT)
Dept: FAMILY MEDICINE | Facility: CLINIC | Age: 62
End: 2022-09-22
Payer: COMMERCIAL

## 2022-09-22 DIAGNOSIS — I49.1 PAC (PREMATURE ATRIAL CONTRACTION): ICD-10-CM

## 2022-09-22 DIAGNOSIS — I49.3 PVC (PREMATURE VENTRICULAR CONTRACTION): ICD-10-CM

## 2022-09-22 DIAGNOSIS — Z00.00 ANNUAL PHYSICAL EXAM: ICD-10-CM

## 2022-09-22 DIAGNOSIS — R53.83 FATIGUE, UNSPECIFIED TYPE: Primary | ICD-10-CM

## 2022-09-22 PROCEDURE — 99213 PR OFFICE/OUTPT VISIT, EST, LEVL III, 20-29 MIN: ICD-10-PCS | Mod: 95,,, | Performed by: FAMILY MEDICINE

## 2022-09-22 PROCEDURE — 99213 OFFICE O/P EST LOW 20 MIN: CPT | Mod: 95,,, | Performed by: FAMILY MEDICINE

## 2022-09-22 NOTE — PROGRESS NOTES
Subjective:       Patient ID: Renate Vang is a 61 y.o. female.    Chief Complaint: Fatigue (Mental and physical)    HPI    The patient is a 61-year-old who is here today concerned about some recent confusion.  She believes that this confusion has been gradually becoming more and more over the past 4 months but has been especially noticeable for the past 2 weeks.  She finds that she is confused.  She provides several examples of the confusion.  She has to reread things several times to understand what she is reading.  She has been making mistakes at work.  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  She sometimes has trouble finding the right word to say.  She understands that this takes could happen to people but feels that this has not been an issue for her before.  She feels as if she is mentally fatigued and physically fatigued.  She wonders if her symptoms may be related to the medicines she has been taking for her PACs and PVCs      Regarding palpitations, she has been having episodes of heart racing since last July.  She has had an echo which was unremarkable and a Holter which showed PACs (averaging 250 per hour) and PVCs (averaging 0.13 per hour).  In April she met with the cardiologist Dr. Kimball.  At that time, she was started on Toprol.  She was still having palpitations and so she was changed to Inderal.  She still has palpitations with the Inderal.  Sometimes she takes Xanax for her palpitations and this helps.  She is scheduled to meet with her cardiologist tomorrow.    While she is meeting with her cardiologist tomorrow, she did ask that I draw her annual labs for her insurance    Review of Systems   Constitutional:  Positive for unexpected weight change. Negative for activity change, appetite change, chills, diaphoresis, fatigue and fever.   HENT:  Positive for  rhinorrhea. Negative for congestion, ear pain, hearing loss, postnasal drip, sinus pressure, sneezing, sore throat and trouble swallowing.    Eyes:  Negative for pain, discharge and visual disturbance.   Respiratory:  Positive for chest tightness. Negative for cough, shortness of breath and wheezing.    Cardiovascular:  Positive for palpitations. Negative for chest pain and leg swelling.   Gastrointestinal:  Negative for abdominal distention, abdominal pain, blood in stool, constipation, diarrhea, nausea and vomiting.   Endocrine: Negative for polydipsia and polyuria.   Genitourinary:  Negative for difficulty urinating, dysuria, hematuria and menstrual problem.   Musculoskeletal:  Positive for arthralgias. Negative for joint swelling and neck pain.   Skin:  Negative for rash.   Neurological:  Negative for weakness and headaches.        Per HPI   Psychiatric/Behavioral:  Negative for confusion and dysphoric mood. The patient is nervous/anxious.      Objective:      Physical Exam  Constitutional:       General: She is not in acute distress.     Appearance: Normal appearance.   Neurological:      Mental Status: She is alert.   Psychiatric:         Attention and Perception: Attention and perception normal.         Mood and Affect: Affect normal. Mood is anxious.         Speech: Speech is rapid and pressured and tangential.         Behavior: Behavior normal. Behavior is cooperative.         Cognition and Memory: Cognition and memory normal.         Judgment: Judgment normal.     Last menstrual period 09/20/2006.There is no height or weight on file to calculate BMI.          A/P:  1) mental and physical fatigue.  New.  We will check labs today.  I also recommend she discuss changing her Inderal to another agent tomorrow at her cardiology appointment and see if that helps her current symptoms.  If she develops any new or worsening symptoms, we will proceed with further evaluation but I am hopeful that changing away from a  beta-blocker would be helpful  2) PACs.  Persistent.  She will discuss this further with her cardiologist tomorrow  3)  Health maintenance issues.  We will check fasting labs soon          The patient location is: home  The chief complaint leading to consultation is Fatigue (Mental and physical)     Visit type: Virtual visit with synchronous audio and video    Each patient to whom he or she provides medical services by telemedicine is:  (1) informed of the relationship between the physician and patient and the respective role of any other health care provider with respect to management of the patient; and (2) notified that he or she may decline to receive medical services by telemedicine and may withdraw from such care at any time.    I spent 21 minutes on this encounter.  This time includes face-to-face time and non-face to face time including previsit chart review, obtaining and/or reviewing separately obtained history, documenting clinical information in the electronic or other health record, independently interpreting results and communicating results to the patient/family/caregiver, or care coordinator.

## 2022-09-23 ENCOUNTER — LAB VISIT (OUTPATIENT)
Dept: LAB | Facility: HOSPITAL | Age: 62
End: 2022-09-23
Attending: FAMILY MEDICINE
Payer: COMMERCIAL

## 2022-09-23 ENCOUNTER — OFFICE VISIT (OUTPATIENT)
Dept: CARDIOLOGY | Facility: CLINIC | Age: 62
End: 2022-09-23
Payer: COMMERCIAL

## 2022-09-23 DIAGNOSIS — I49.3 PVC (PREMATURE VENTRICULAR CONTRACTION): ICD-10-CM

## 2022-09-23 DIAGNOSIS — Z00.00 ANNUAL PHYSICAL EXAM: ICD-10-CM

## 2022-09-23 DIAGNOSIS — I49.1 PREMATURE ATRIAL CONTRACTIONS: ICD-10-CM

## 2022-09-23 DIAGNOSIS — I49.1 PAC (PREMATURE ATRIAL CONTRACTION): ICD-10-CM

## 2022-09-23 LAB
ALBUMIN SERPL BCP-MCNC: 4.2 G/DL (ref 3.5–5.2)
ALP SERPL-CCNC: 64 U/L (ref 55–135)
ALT SERPL W/O P-5'-P-CCNC: 19 U/L (ref 10–44)
ANION GAP SERPL CALC-SCNC: 7 MMOL/L (ref 8–16)
AST SERPL-CCNC: 21 U/L (ref 10–40)
BASOPHILS # BLD AUTO: 0.05 K/UL (ref 0–0.2)
BASOPHILS NFR BLD: 1 % (ref 0–1.9)
BILIRUB SERPL-MCNC: 0.7 MG/DL (ref 0.1–1)
BUN SERPL-MCNC: 14 MG/DL (ref 8–23)
CALCIUM SERPL-MCNC: 9.5 MG/DL (ref 8.7–10.5)
CHLORIDE SERPL-SCNC: 105 MMOL/L (ref 95–110)
CHOLEST SERPL-MCNC: 257 MG/DL (ref 120–199)
CHOLEST/HDLC SERPL: 3.4 {RATIO} (ref 2–5)
CO2 SERPL-SCNC: 28 MMOL/L (ref 23–29)
CREAT SERPL-MCNC: 0.8 MG/DL (ref 0.5–1.4)
DIFFERENTIAL METHOD: NORMAL
EOSINOPHIL # BLD AUTO: 0.1 K/UL (ref 0–0.5)
EOSINOPHIL NFR BLD: 2 % (ref 0–8)
ERYTHROCYTE [DISTWIDTH] IN BLOOD BY AUTOMATED COUNT: 13.2 % (ref 11.5–14.5)
EST. GFR  (NO RACE VARIABLE): >60 ML/MIN/1.73 M^2
ESTIMATED AVG GLUCOSE: 100 MG/DL (ref 68–131)
GLUCOSE SERPL-MCNC: 94 MG/DL (ref 70–110)
HBA1C MFR BLD: 5.1 % (ref 4–5.6)
HCT VFR BLD AUTO: 41 % (ref 37–48.5)
HDLC SERPL-MCNC: 75 MG/DL (ref 40–75)
HDLC SERPL: 29.2 % (ref 20–50)
HGB BLD-MCNC: 13.4 G/DL (ref 12–16)
IMM GRANULOCYTES # BLD AUTO: 0.02 K/UL (ref 0–0.04)
IMM GRANULOCYTES NFR BLD AUTO: 0.4 % (ref 0–0.5)
LDLC SERPL CALC-MCNC: 164.6 MG/DL (ref 63–159)
LYMPHOCYTES # BLD AUTO: 2.1 K/UL (ref 1–4.8)
LYMPHOCYTES NFR BLD: 41.5 % (ref 18–48)
MCH RBC QN AUTO: 30 PG (ref 27–31)
MCHC RBC AUTO-ENTMCNC: 32.7 G/DL (ref 32–36)
MCV RBC AUTO: 92 FL (ref 82–98)
MONOCYTES # BLD AUTO: 0.4 K/UL (ref 0.3–1)
MONOCYTES NFR BLD: 8.1 % (ref 4–15)
NEUTROPHILS # BLD AUTO: 2.4 K/UL (ref 1.8–7.7)
NEUTROPHILS NFR BLD: 47 % (ref 38–73)
NONHDLC SERPL-MCNC: 182 MG/DL
NRBC BLD-RTO: 0 /100 WBC
PLATELET # BLD AUTO: 295 K/UL (ref 150–450)
PMV BLD AUTO: 10 FL (ref 9.2–12.9)
POTASSIUM SERPL-SCNC: 4.9 MMOL/L (ref 3.5–5.1)
PROT SERPL-MCNC: 7.1 G/DL (ref 6–8.4)
RBC # BLD AUTO: 4.47 M/UL (ref 4–5.4)
SODIUM SERPL-SCNC: 140 MMOL/L (ref 136–145)
TRIGL SERPL-MCNC: 87 MG/DL (ref 30–150)
TSH SERPL DL<=0.005 MIU/L-ACNC: 1.16 UIU/ML (ref 0.4–4)
WBC # BLD AUTO: 5.08 K/UL (ref 3.9–12.7)

## 2022-09-23 PROCEDURE — 36415 COLL VENOUS BLD VENIPUNCTURE: CPT | Mod: PO | Performed by: FAMILY MEDICINE

## 2022-09-23 PROCEDURE — 99214 OFFICE O/P EST MOD 30 MIN: CPT | Mod: 95,,, | Performed by: INTERNAL MEDICINE

## 2022-09-23 PROCEDURE — 84443 ASSAY THYROID STIM HORMONE: CPT | Performed by: FAMILY MEDICINE

## 2022-09-23 PROCEDURE — 80061 LIPID PANEL: CPT | Performed by: FAMILY MEDICINE

## 2022-09-23 PROCEDURE — 85025 COMPLETE CBC W/AUTO DIFF WBC: CPT | Performed by: FAMILY MEDICINE

## 2022-09-23 PROCEDURE — 99214 PR OFFICE/OUTPT VISIT, EST, LEVL IV, 30-39 MIN: ICD-10-PCS | Mod: 95,,, | Performed by: INTERNAL MEDICINE

## 2022-09-23 PROCEDURE — 83036 HEMOGLOBIN GLYCOSYLATED A1C: CPT | Performed by: FAMILY MEDICINE

## 2022-09-23 PROCEDURE — 80053 COMPREHEN METABOLIC PANEL: CPT | Performed by: FAMILY MEDICINE

## 2022-09-23 NOTE — PROGRESS NOTES
Subjective:    Patient ID:  Renate Vang is a 61 y.o. female who presents for follow-up of No chief complaint on file.      TELEMEDICINE VISIT      Problem List Items Addressed This Visit          Cardiac/Vascular    Premature atrial contractions       HPI    Patient was last seen on 04/25/2022 at which time she was evaluated for symptomatic PACs.  Metoprolol was started.    Patient presents for telemedicine visit.    On assessment today, the patient states that she is having issues with mental confusion and being unable to concentrate. Making some mental mistakes at work.  Issues started within the past 4 months.    Palpitations better with propranolol - Still having some tachycardia issues in the day, just has the sensation  Concerned mental side effects are from propranolol          Objective:   There were no vitals filed for this visit.     Physical Exam  Constitutional:       General: She is not in acute distress.     Appearance: She is well-developed. She is not diaphoretic.   HENT:      Head: Normocephalic and atraumatic.   Eyes:      General: No scleral icterus.     Conjunctiva/sclera: Conjunctivae normal.   Pulmonary:      Effort: No respiratory distress.      Breath sounds: No stridor.   Musculoskeletal:         General: No signs of injury.      Cervical back: Normal range of motion.   Skin:     Coloration: Skin is not jaundiced.   Neurological:      Mental Status: She is alert. Mental status is at baseline.   Psychiatric:         Mood and Affect: Mood normal.         Behavior: Behavior normal.           Current Outpatient Medications on File Prior to Visit   Medication Sig    ALPRAZolam (XANAX) 0.25 MG tablet Take 1 tablet (0.25 mg total) by mouth 2 (two) times daily as needed for Anxiety.    calcium-vitamin D (OSCAL) 250 (625)-125 mg-unit per tablet Take 1 tablet by mouth once daily.    cetirizine (ZYRTEC) 10 MG tablet Take 10 mg by mouth once daily.    glucosamine-chondroitin 500-400 mg tablet Take 1  tablet by mouth 2 (two) times daily.    Lactobacillus rhamnosus GG (CULTURELLE) 10 billion cell capsule Take 1 capsule by mouth once daily.    meloxicam (MOBIC) 15 MG tablet Take 1 tablet (15 mg total) by mouth once daily.    propranoloL (INDERAL LA) 60 MG 24 hr capsule Take 1 capsule (60 mg total) by mouth once daily.    RESTASIS 0.05 % ophthalmic emulsion     vit A/vit C/vit E/zinc/copper (ICAPS AREDS ORAL) Take by mouth.    vitamin D (VITAMIN D3) 1000 units Tab Take 600 Units by mouth once daily.     zinc gluconate 50 mg tablet Take 25 mg by mouth once daily.      No current facility-administered medications on file prior to visit.       Lipid Panel:   Lab Results   Component Value Date    CHOL 229 (H) 07/07/2022    HDL 65 07/07/2022    LDLCALC 136.0 07/07/2022    TRIG 140 07/07/2022    CHOLHDL 28.4 07/07/2022       The 10-year ASCVD risk score (Omer LONDON, et al., 2019) is: 2.9%    Values used to calculate the score:      Age: 61 years      Sex: Female      Is Non- : No      Diabetic: No      Tobacco smoker: No      Systolic Blood Pressure: 108 mmHg      Is BP treated: No      HDL Cholesterol: 65 mg/dL      Total Cholesterol: 229 mg/dL    All pertinent labs, imaging, and EKGs reviewed.  Patient's most recent EKG tracing was personally interpreted by this provider.    Most Recent Echocardiogram Results  Results for orders placed in visit on 07/11/21    Echo    Interpretation Summary  · The left ventricle is normal in size with concentric remodeling and normal systolic function.  · The estimated ejection fraction is 65%.  · Normal left ventricular diastolic function.  · Normal right ventricular size with normal right ventricular systolic function.  · Normal central venous pressure (3 mmHg).        Most Recent EKG  Results for orders placed or performed in visit on 02/26/22   IN OFFICE EKG 12-LEAD (to [place])    Collection Time: 02/26/22  9:34 AM    Narrative    Test Reason : R00.2,    Vent.  Rate : 076 BPM     Atrial Rate : 076 BPM     P-R Int : 122 ms          QRS Dur : 098 ms      QT Int : 394 ms       P-R-T Axes : 071 084 064 degrees     QTc Int : 443 ms    Normal sinus rhythm  Normal ECG  When compared with ECG of 08-JUL-2021 07:03,  No significant change was found  Confirmed by Sourav Enciso MD ([id number]) on 2/27/2022 7:08:50 AM    Referred By:             Confirmed By:Sourav Enciso MD       No valid procedures specified.   No results found for this or any previous visit.    No valid procedures specified.    Assessment:       1. Premature atrial contractions         Plan:     Symptoms of some palpitations  BP/Pulse unable to be assessed on telemedicine visit  Most recent echocardiogram reviewed personally      Stop propranolol at this time and monitor mental clarity and monitor palpitations  If palpitations worsen and willing to consider alternative medication, then would start diltiazem  mg PO Daily  Could also consider propranolol PRN dosing    Continue other cardiac medications  Mediterranean Diet/Cardiovascular Exercise Program    Patient queried and all questions were answered.    F/u as scheduled in December    The patient location is: Home   The chief complaint leading to consultation is:  Please see problem list above  Visit type: Virtual visit with synchronous audio and video  Total time spent with patient: 15 minutes   Each patient to whom he or she provides medical services by telemedicine is:  (1) informed of the relationship between the physician and patient and the respective role of any other health care provider with respect to management of the patient; and (2) notified that he or she may decline to receive medical services by telemedicine and may withdraw from such care at any time.    Notes: See above       Signed:    Anton Salazar MD  9/23/2022 7:50 PM

## 2022-09-25 ENCOUNTER — PATIENT MESSAGE (OUTPATIENT)
Dept: FAMILY MEDICINE | Facility: CLINIC | Age: 62
End: 2022-09-25
Payer: COMMERCIAL

## 2022-09-25 NOTE — TELEPHONE ENCOUNTER
The 10-year ASCVD risk score (Omer LONDON, et al., 2019) is: 2.9%    Values used to calculate the score:      Age: 61 years      Sex: Female      Is Non- : No      Diabetic: No      Tobacco smoker: No      Systolic Blood Pressure: 108 mmHg      Is BP treated: No      HDL Cholesterol: 75 mg/dL      Total Cholesterol: 257 mg/dL

## 2022-09-29 NOTE — PROGRESS NOTES
[facility]  [address] [place] [address]             Full Name: eRnate Vang Gender: Female  Patient ID: [id number] YOB: 1960  History: Presents for electrodiagnostic study secondary to complaints of bilateral hand pains (right worse than left) with noted weakness in  strength. Reports numbnes in all fingers. Denies neck pain. Patient denies history of neck surgery. History of radiation for breast cancer, right side. Also history of tamoxifen for 5 years. No history of diabetes, excessive alcohol abuse, blood thinner use, or implantable electronic devices.     Upper extremity manual muscle testing 5/5 in bilateral shoulder abduction, bilateral elbow flexion, bilateral wrist extension, bilateral elbow extension, bilateral finger flexion, bilateral finger abduction. Reflexes 2/4 to bilateral biceps tendon, bilateral triceps tendon, and bilateral brachioradialis tendon. Sensory intact to light touch in the upper extremities. Negative bilateral Spurling's. Absent bilateral wrist clonus. Absent bilateral Emery's.         Visit Date: 9/30/2022 7:13 AM  Age: 61 Years  Examining Physician: Madeline Alas DO  Referring Physician: Jose Cage MD      Sensory NCS      Nerve / Sites Rec. Site Onset Lat Peak Lat NP Amp PP Amp Segments Distance Velocity     ms ms µV µV  cm m/s   L Median - Digit III (Antidromic)      Wrist Dig III 3.19 4.10 24.2 43.6 Wrist - Dig III 14 44   R Median - Digit III (Antidromic)      Wrist Dig III 3.38 4.23 26.8 46.9 Wrist - Dig III 14 41   L Ulnar - Digit V (Antidromic)      Wrist Dig V 2.31 2.69 4.2 16.6 Wrist - Dig V 14 61   R Ulnar - Digit V (Antidromic)      Wrist Dig V 3.21 4.08 18.7 39.8 Wrist - Dig V 14 44   L Radial - Anatomical snuff box (Forearm)      Forearm Wrist 2.21 2.75 6.6 6.9 Forearm - Wrist 10 45   R Radial - Anatomical snuff box (Forearm)      Forearm Wrist 2.27 2.75 6.0 10.8 Forearm - Wrist 10 44   L Sural - Ankle (Calf)      Calf  Ankle 2.33 2.71 3.9 22.6 Calf - Ankle 14 60       Motor NCS      Nerve / Sites Muscle Latency Amplitude Amp % Duration Segments Distance Lat Diff Velocity     ms mV % ms  cm ms m/s   R Median - APB      Wrist APB 4.15 8.0 100 6.98 Wrist - APB 8        Elbow APB 7.90 7.9 98.5 7.06 Elbow - Wrist 19 3.75 51   L Median - APB      Wrist APB 4.13 6.9 100 6.38 Wrist - APB 8        Elbow APB 7.54 9.2 133 6.60 Elbow - Wrist 18 3.42 53   R Ulnar - ADM      Wrist ADM 3.79 9.1 100 6.06 Wrist - ADM 8        B.Elbow ADM 7.67 9.1 100 5.90 B.Elbow - Wrist 22 3.88 57      A.Elbow ADM 9.13 10.2 112 6.23 A.Elbow - B.Elbow 10 1.46 69   L Ulnar - ADM      Wrist ADM 3.46 8.7 100 7.44 Wrist - ADM 8        B.Elbow ADM 6.98 8.7 100 7.60 B.Elbow - Wrist 22 3.52 62      A.Elbow ADM 8.92 8.9 103 7.42 A.Elbow - B.Elbow 10 1.94 52   L Peroneal - EDB      Ankle EDB 4.83 6.3 100 6.81 Ankle - EDB 8        Fib head EDB 11.04 6.3 99.6 7.08 Fib head - Ankle 28 6.21 45       Summary    The motor conduction test was performed on 5 nerve(s). The results were normal in 4 nerve(s): R Median - APB, L Median - APB, L Ulnar - ADM, L Peroneal - EDB. Results outside the specified normal range were found in 1 nerve(s), as follows:  In the R Ulnar - ADM study  the take off latency result was increased for Wrist stimulation    The sensory conduction test had results outside of the specified normal range in all 7 of the tested nerves:  In the L Median - Digit III (Antidromic) study  the peak latency result was increased for Wrist stimulation  In the R Median - Digit III (Antidromic) study  the peak latency result was increased for Wrist stimulation  In the L Ulnar - Digit V (Antidromic) study  the peak amplitude result was reduced for Wrist stimulation  In the R Ulnar - Digit V (Antidromic) study  the peak latency result was increased for Wrist stimulation  In the L Radial - Anatomical snuff box (Forearm) study  the peak amplitude result was reduced for Forearm  stimulation  In the R Radial - Anatomical snuff box (Forearm) study  the peak amplitude result was reduced for Forearm stimulation  In the L Sural - Ankle (Calf) study  the peak amplitude result was reduced for Calf stimulation        Renate Vang [id number] 9/30/2022 7:13 AM     2 of 3  Impression:  Abnormal study.  Sensory, demyelinating greater than axonal, peripheral neuropathy affecting both upper extremities as well as left lower extremity.    Plan:   We discussed findings at length.  She has evidence of a primarily sensory peripheral neuropathy affecting both of her upper extremities as well as her left lower extremity tested today with his suspicion that findings would be consistent in her right lower extremity as well.  There is low suspicion that there is any other superimposed distal median or ulnar mononeuropathy at this time.  Recommend further clinical correlation.      ------------------------------  DO Renate Mensah [id number] 9/30/2022 7:13 AM     2 of 3

## 2022-09-30 ENCOUNTER — OFFICE VISIT (OUTPATIENT)
Dept: PHYSICAL MEDICINE AND REHAB | Facility: CLINIC | Age: 62
End: 2022-09-30
Payer: COMMERCIAL

## 2022-09-30 DIAGNOSIS — G56.90 NEUROPATHY OF UPPER EXTREMITY, UNSPECIFIED LATERALITY: Primary | ICD-10-CM

## 2022-09-30 DIAGNOSIS — G60.8 SENSORY PERIPHERAL NEUROPATHY: ICD-10-CM

## 2022-09-30 PROCEDURE — 95912 NRV CNDJ TEST 11-12 STUDIES: CPT | Mod: S$GLB,,, | Performed by: PHYSICAL MEDICINE & REHABILITATION

## 2022-09-30 PROCEDURE — 99999 PR PBB SHADOW E&M-EST. PATIENT-LVL II: ICD-10-PCS | Mod: PBBFAC,,, | Performed by: PHYSICAL MEDICINE & REHABILITATION

## 2022-09-30 PROCEDURE — 99202 PR OFFICE/OUTPT VISIT, NEW, LEVL II, 15-29 MIN: ICD-10-PCS | Mod: S$GLB,,, | Performed by: PHYSICAL MEDICINE & REHABILITATION

## 2022-09-30 PROCEDURE — 99999 PR PBB SHADOW E&M-EST. PATIENT-LVL II: CPT | Mod: PBBFAC,,, | Performed by: PHYSICAL MEDICINE & REHABILITATION

## 2022-09-30 PROCEDURE — 95912 PR NERVE CONDUCTION STUDY; 11 -12 STUDIES: ICD-10-PCS | Mod: S$GLB,,, | Performed by: PHYSICAL MEDICINE & REHABILITATION

## 2022-09-30 PROCEDURE — 99202 OFFICE O/P NEW SF 15 MIN: CPT | Mod: S$GLB,,, | Performed by: PHYSICAL MEDICINE & REHABILITATION

## 2022-10-12 ENCOUNTER — OFFICE VISIT (OUTPATIENT)
Dept: ORTHOPEDICS | Facility: CLINIC | Age: 62
End: 2022-10-12
Payer: COMMERCIAL

## 2022-10-12 VITALS — HEIGHT: 63 IN | WEIGHT: 146 LBS | BODY MASS INDEX: 25.87 KG/M2

## 2022-10-12 DIAGNOSIS — G56.90 NEUROPATHY OF UPPER EXTREMITY, UNSPECIFIED LATERALITY: Primary | ICD-10-CM

## 2022-10-12 DIAGNOSIS — G62.9 PERIPHERAL POLYNEUROPATHY: Primary | ICD-10-CM

## 2022-10-12 PROCEDURE — 99213 OFFICE O/P EST LOW 20 MIN: CPT | Mod: S$GLB,,, | Performed by: ORTHOPAEDIC SURGERY

## 2022-10-12 PROCEDURE — 99999 PR PBB SHADOW E&M-EST. PATIENT-LVL III: CPT | Mod: PBBFAC,,, | Performed by: ORTHOPAEDIC SURGERY

## 2022-10-12 PROCEDURE — 99213 PR OFFICE/OUTPT VISIT, EST, LEVL III, 20-29 MIN: ICD-10-PCS | Mod: S$GLB,,, | Performed by: ORTHOPAEDIC SURGERY

## 2022-10-12 PROCEDURE — 99999 PR PBB SHADOW E&M-EST. PATIENT-LVL III: ICD-10-PCS | Mod: PBBFAC,,, | Performed by: ORTHOPAEDIC SURGERY

## 2022-10-17 NOTE — PROGRESS NOTES
Ms. Vang returns to clinic today.  She has a history of numbness and tingling to the fingertips on both hands which is worse on the right.  She was sent for nerve conduction study to assess for possible peripheral neuropathy versus compressive neuropathy.  She is here today for further evaluation.      Physical exam:  Examination of bilateral upper extremities reveals that there is no edema.  There is no muscular wasting.  There are no major skin changes.  Palpation does not produce tenderness.  Sensation is grossly intact but mildly decreased throughout the median radial ulnar distributions bilaterally.  There is some paresthesia noted.  She does have 5/5 thenar muscular strength bilaterally     EMG/nerve conduction study: Nerve conduction study is consistent with peripheral neuropathy without significant signs of a compressive neuropathy     Assessment:  Peripheral polyneuropathy    Plan:    1. I will refer the patient for neurologic evaluation    2.  Follow up with me on a p.r.n. basis

## 2022-11-04 ENCOUNTER — OFFICE VISIT (OUTPATIENT)
Dept: CARDIOLOGY | Facility: CLINIC | Age: 62
End: 2022-11-04
Payer: COMMERCIAL

## 2022-11-04 DIAGNOSIS — R07.89 ATYPICAL CHEST PAIN: ICD-10-CM

## 2022-11-04 DIAGNOSIS — I49.1 PREMATURE ATRIAL CONTRACTIONS: Primary | ICD-10-CM

## 2022-11-04 PROCEDURE — 99214 PR OFFICE/OUTPT VISIT, EST, LEVL IV, 30-39 MIN: ICD-10-PCS | Mod: 95,,, | Performed by: INTERNAL MEDICINE

## 2022-11-04 PROCEDURE — 99214 OFFICE O/P EST MOD 30 MIN: CPT | Mod: 95,,, | Performed by: INTERNAL MEDICINE

## 2022-11-14 ENCOUNTER — TELEPHONE (OUTPATIENT)
Dept: CARDIOLOGY | Facility: CLINIC | Age: 62
End: 2022-11-14
Payer: COMMERCIAL

## 2022-11-14 NOTE — TELEPHONE ENCOUNTER
----- Message from Silvana Gonsalves sent at 11/14/2022  4:31 PM CST -----  Contact: Patient  Type: Patient Call Back         Who called: Patient         What is the request in detail:  I have Malika Vang calling to resched her nuclear stress test that is for 11/16; prefers some time in December; prefers a Friday afternoon; please advise         Best call back number: [phone]         Additional Information: states she cxl'd 11/16 via [place]           Thank You

## 2022-11-15 ENCOUNTER — TELEPHONE (OUTPATIENT)
Dept: CARDIOLOGY | Facility: CLINIC | Age: 62
End: 2022-11-15
Payer: COMMERCIAL

## 2022-11-15 DIAGNOSIS — I49.1 PREMATURE ATRIAL CONTRACTIONS: Primary | ICD-10-CM

## 2022-11-15 NOTE — TELEPHONE ENCOUNTER
Please let the patient know that her insurance company apparently knows how to practice Cardiology better than I do.  Have her come in for an exercise stress echocardiogram and if she does not reach target heart rate, then we can consider the appropriate stress test at that time.  Please have her hold her beta blocker prior to the test.     Delio

## 2022-11-29 ENCOUNTER — OFFICE VISIT (OUTPATIENT)
Dept: NEUROLOGY | Facility: CLINIC | Age: 62
End: 2022-11-29
Payer: COMMERCIAL

## 2022-11-29 VITALS
RESPIRATION RATE: 17 BRPM | HEART RATE: 81 BPM | WEIGHT: 152.13 LBS | DIASTOLIC BLOOD PRESSURE: 76 MMHG | SYSTOLIC BLOOD PRESSURE: 121 MMHG | BODY MASS INDEX: 26.95 KG/M2

## 2022-11-29 DIAGNOSIS — G56.90 NEUROPATHY OF UPPER EXTREMITY, UNSPECIFIED LATERALITY: Primary | ICD-10-CM

## 2022-11-29 DIAGNOSIS — R20.9 SENSORY DISTURBANCE: ICD-10-CM

## 2022-11-29 PROCEDURE — 99999 PR PBB SHADOW E&M-EST. PATIENT-LVL IV: CPT | Mod: PBBFAC,,, | Performed by: PSYCHIATRY & NEUROLOGY

## 2022-11-29 PROCEDURE — 99204 OFFICE O/P NEW MOD 45 MIN: CPT | Mod: S$GLB,,, | Performed by: PSYCHIATRY & NEUROLOGY

## 2022-11-29 PROCEDURE — 99204 PR OFFICE/OUTPT VISIT, NEW, LEVL IV, 45-59 MIN: ICD-10-PCS | Mod: S$GLB,,, | Performed by: PSYCHIATRY & NEUROLOGY

## 2022-11-29 PROCEDURE — 99999 PR PBB SHADOW E&M-EST. PATIENT-LVL IV: ICD-10-PCS | Mod: PBBFAC,,, | Performed by: PSYCHIATRY & NEUROLOGY

## 2022-11-29 NOTE — PROGRESS NOTES
NEUROLOGY  Outpatient CONSULT  [facility]  [address] [facility] [address], [place] [address]  [phone] (office) / [phone] (fax)    Patient Name:  Renate Vang  :  1960  MR #:  [id number]  Acct #:  [id number]    Date of Neurology Consult: 2022  Name of Neurologist: Ester Palacios D.O, ABPN, AOBNP, ABEM    Other Physicians:  Sameera Dudley MD (Primary Care Physician); Jose Cage MD (Referring)      Chief Complaint: Peripheral Neuropathy      History of Present Illness (HPI):  Renate Vang is a 62 y.o. female referred by orthopedics for evaluation of neuropathy.    She noticed numbness in her right hand starting 6 or more months ago.  She states it was not obvious, but sometimes when she would pick things up the sensation was not as strong.  Sometimes it was hard to pick pages apart.  She has noticed this in the left hand as well.    She has no tingling.  She can't say that there are really any symptoms at night.      She notices that with heavy lifting, she will get some pain in her right index finger.  This is mild.  Doing yoga poses does not bother it.      When she lays down to go to bed at night she will get occasional jabbing pain in a toe or bottom of her feet at random.  Nothing persistent.      She feels like her symptoms were better while on vacation.    In  she had a benign parathyroid growth. She is on Calcium.  She gets her Vit D checked as well.  These are both normal.    She averages 1-2 alcoholic beverages a day. She is not a smoker.  She has no history of diabetes.    She has a history of breast CA treated with lumpectomy, radiation and tamoxifen for 5 years (ended ).    Treatment to date:    N/A    Review of Systems:   General: Weight gain: No, Weight Loss: No, Fatigue: No,   Fever: No, Chills: No, Night Sweats: No, Insomnia: No, Excessive sleeping: No   Respiratory:  Cough: No, Shortness of Breath: No,   Wheezing: No, Excessive Snoring:  No, Coughing up blood: No  Endocrine: Heat Intolerance: No, Cold Intolerance: No,   Excessive Thirst: No, Excessive Hunger: No,   Eyes:  Blurred Vision: No, Double Vision: No,   Light Sensitivity: Yes, Eye pain: Yes  Musculoskeletal: Muscle Aches/Pain: No, Joint Pain/Swelling: No, Muscle Cramps: No, Muscle Weakness: No, Neck Pain: No, Back Pain: No   Neurological: Difficulty Walking/Falls: No, Headache Migraine: No, Dizziness/Vertigo: No, Fainting: No, Difficulty with Speech: Yes, Weakness: No, Tingling/Numbness: Yes, Tremors: No, Memory Problems: Yes, Seizures: No, Difficulty Swallowing: No, Altered Taste: No.  Cardiovascular: Chest Pain: No, Shortness of Breath: No,   Palpitations: Yes,  Gastrointestinal: Nausea/Vomiting: No, Constipation: No, Diarrhea: No, Bloody Stools: No   Psych/Cog:  Depression: No, Anxiety: Yes, Hallucinations: No, Problems Concentrating: Yes  : Frequent Urination: No, Incontinence: No, Urinary Infections: No, Blood of Urine: No,   ENT:Hearing Loss: No, Earache: No, Ringing in Ears: No,   Facial Pain: No, Chronic Congestion: No   Immune: Seasonal Allergies: No, Hives and/or Rashes: No  The remainder of the review of twelve body systems was reviewed and normal.    Past Medical, Surgical, Family & Social History:   Past Medical History:   Diagnosis Date    Anxiety resolved    Breast cancer     Depression     History of abnormal cervical Pap smear     History of basal cell carcinoma 03/2011    right axilla    History of breast cancer 2003    at 42 s/p surg and radiation [id number] ER+    History of HPV infection     LOW RISK    History of parathyroidectomy     R lower gland removed due to adenoma    Kidney stones     Osteopenia     last dexa - refuses additional testing     PONV (postoperative nausea and vomiting)     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) 2012     Past Surgical History:   Procedure Laterality Date    AUGMENTATION OF BREAST      BREAST BIOPSY      BREAST LUMPECTOMY   2003    x2 Right with senital node bx, with radiation    BREAST RECONSTRUCTION      s/p lumpectomy to replace previous implants bilaterally    BREAST SURGERY      breast augmentation prior to lumpectomy    cataract surgery       SECTION, LOW TRANSVERSE      x2    COLONOSCOPY N/A 2018    Procedure: COLONOSCOPY;  Surgeon: Khanh Forrester Jr., MD;  Location: [facility] END[facility];  Service: Endoscopy;  Laterality: N/A;    COLONOSCOPY W/ POLYPECTOMY  2013    HERBIE.   One 1 mm polyp at the hepatic flexure. TUBULAR ADENOMATOUS POLYP.  One 1 mm polyp in the cecum. TUBULAR ADENOMATOUS POLYP.  The examination was otherwise normal.      HERNIA REPAIR      bilateral groin     LASER ABLATION OF CONDYLOMAS      MALIGNANT SKIN LESION EXCISION      nose & under right arm    PARATHYROID GLAND SURGERY      R lower gland removed due to adenoma    TONSILLECTOMY       Family History   Problem Relation Age of Onset    Glaucoma Mother     Osteoarthritis Mother     Arrhythmia Mother     Fibromyalgia Mother     Kidney disease Mother     Cancer Maternal Uncle         colon    Hypertension Father     Hashimoto's thyroiditis Sister     Anxiety disorder Sister     Heart disease Maternal Grandmother     COPD Maternal Grandmother     Tuberculosis Maternal Grandmother     Hypertension Maternal Grandmother     COPD Maternal Grandfather     Osteoarthritis Maternal Grandfather     COPD Paternal Grandfather     Breast cancer Cousin     Ovarian cancer Neg Hx      Alcohol use:  reports current alcohol use of about 4.0 standard drinks per week.   (Of note, 0.6 oz = 1 beer or 6 oz = 10 beers).  Tobacco use:  reports that she has never smoked. She has never used smokeless tobacco.  Street drug use:  reports current drug use. Drug: Benzodiazepines.  Allergies: Propranolol, Wellbutrin [bupropion hcl], and Adhesive.    Home Medications:     Current Outpatient Medications:     calcium-vitamin D (OSCAL) 250 (625)-125 mg-unit per tablet, Take 1  tablet by mouth once daily., Disp: , Rfl:     glucosamine-chondroitin 500-400 mg tablet, Take 1 tablet by mouth 2 (two) times daily., Disp: , Rfl:     Lactobacillus rhamnosus GG (CULTURELLE) 10 billion cell capsule, Take 1 capsule by mouth once daily., Disp: , Rfl:     RESTASIS 0.05 % ophthalmic emulsion, , Disp: , Rfl:     vit A/vit C/vit E/zinc/copper (ICAPS AREDS ORAL), Take by mouth., Disp: , Rfl:     vitamin D (VITAMIN D3) 1000 units Tab, Take 600 Units by mouth once daily. , Disp: , Rfl:     zinc gluconate 50 mg tablet, Take 25 mg by mouth once daily. , Disp: , Rfl:     ALPRAZolam (XANAX) 0.25 MG tablet, Take 1 tablet (0.25 mg total) by mouth 2 (two) times daily as needed for Anxiety., Disp: 40 tablet, Rfl: 0    cetirizine (ZYRTEC) 10 MG tablet, Take 10 mg by mouth once daily., Disp: , Rfl:     Physical Examination:  /76 (BP Location: Left arm, Patient Position: Sitting, BP Method: Medium (Automatic))   Pulse 81   Resp 17   Wt 69 kg (152 lb 1.9 oz)   LMP 09/20/2006   BMI 26.95 kg/m²     GENERAL:  General appearance: Well, non-toxic appearing.  No apparent distress..  Extremities: normal.    MENTAL STATUS:  Alertness, attention span & concentration: normal.  Language: normal.  Orientation to self, place & time:  normal.  Memory, recent & remote: normal.  Fund of knowledge: normal.    SPEECH:  Clear and fluent.  Follows complex commands.    CRANIAL NERVES:  Cranial Nerves II-XII were examined.  II - Visual fields: normal.  III, IV, VI: PERRL, EOMI, No ptosis, No nystagmus.  V - Facial sensation: normal.  VII - Face symmetry & mobility: normal.  VIII - Hearing: normal.  IX, X - Palate: mobile & midline.  XI - Shoulder shrug: normal.  XII - Tongue protrusion: normal.    GROSS MOTOR:  Gait & station: normal.  Tone: normal.  Abnormal movements: none.  Finger-nose & Heel-knee-shin: normal.  Rapid alternating movements & drift: normal.    MUSCLE STRENGTH:     Fascics Atrophy RIGHT    LEFT Atrophy Fascics      5 Neck Ext. 5       5 Neck Flex 5       5 Deltoids 5       5 Sh.Ext.Rot. 5       5 Sh.Int.Rot. 5       5 Biceps 5       5 Triceps 5       5 Forearm.Pr. 5       5 Wrist Ext. 5       5 Wrist Flex 5       5 Finger Ext. 5       5 Finger Flex 5       5 FPL 5       5 Inteross. 5                         5 Iliopsoas 5       5 Hip Abduct 5       5 Hip Adduct 5       5 Quads 5       5 Hams 5       5 Dorsiflex 5       5 Plantar Flex 5       5 Ankle Nick 5       5 Ankle Invert 5       5 Toe Ext. 5       5 Toe Flex 5                         REFLEXES:    RIGHT Reflex   LEFT   2+ Biceps 2+   2+ Brachiorad. 2+   2+ Triceps 2+        2+ Patellar 2+   2+ Ankle 2+        Down PLANTAR Down     SENSORY:  Sharp touch: sl decreased right great toe, also slightly decreased bilateral proximal upper limbs.  Vibration: Normal throughout.      Diagnostic Data Reviewed:     Component      Latest Ref Rng & Units 9/23/2022   WBC      3.90 - 12.70 K/uL 5.08   RBC      4.00 - 5.40 M/uL 4.47   Hemoglobin      12.0 - 16.0 g/dL 13.4   Hematocrit      37.0 - 48.5 % 41.0   MCV      82 - 98 fL 92   MCH      27.0 - 31.0 pg 30.0   MCHC      32.0 - 36.0 g/dL 32.7   RDW      11.5 - 14.5 % 13.2   Platelets      150 - 450 K/uL 295   MPV      9.2 - 12.9 fL 10.0   Immature Granulocytes      0.0 - 0.5 % 0.4   Gran # (ANC)      1.8 - 7.7 K/uL 2.4   Immature Grans (Abs)      0.00 - 0.04 K/uL 0.02   Lymph #      1.0 - 4.8 K/uL 2.1   Mono #      0.3 - 1.0 K/uL 0.4   Eos #      0.0 - 0.5 K/uL 0.1   Baso #      0.00 - 0.20 K/uL 0.05   nRBC      0 /100 WBC 0   Gran %      38.0 - 73.0 % 47.0   Lymph %      18.0 - 48.0 % 41.5   Mono %      4.0 - 15.0 % 8.1   Eosinophil %      0.0 - 8.0 % 2.0   Basophil %      0.0 - 1.9 % 1.0   Differential Method       Automated   Sodium      136 - 145 mmol/L 140   Potassium      3.5 - 5.1 mmol/L 4.9   Chloride      95 - 110 mmol/L 105   CO2      23 - 29 mmol/L 28   Glucose      70 - 110 mg/dL 94   BUN      8 - 23 mg/dL 14    Creatinine      0.5 - 1.4 mg/dL 0.8   Calcium      8.7 - 10.5 mg/dL 9.5   PROTEIN TOTAL      6.0 - 8.4 g/dL 7.1   Albumin      3.5 - 5.2 g/dL 4.2   BILIRUBIN TOTAL      0.1 - 1.0 mg/dL 0.7   Alkaline Phosphatase      55 - 135 U/L 64   AST      10 - 40 U/L 21   ALT      10 - 44 U/L 19   Anion Gap      8 - 16 mmol/L 7 (L)   eGFR      >60 mL/min/1.73 m:2 >60.0   Hemoglobin A1C External      4.0 - 5.6 % 5.1   Estimated Avg Glucose      68 - 131 mg/dL 100   TSH      0.400 - 4.000 uIU/mL 1.157     Nerve Conduction Studies only with Dr. Alas 9/30/22:  Sensory, demyelinating greater than axonal, peripheral neuropathy affecting both upper extremities as well as left lower extremity.    MRI lumbar spine 4/3/14  Technique: Multiplanar MR imaging of the lumbar spine was performed utilizing axial and sagittal T1 and T2 weighted pulse sequences.   Findings:   The lumbar vertebral bodies show normal height, signal intensity, and alignment without evidence of acute compression fracture or pathologic marrow replacement process. There is degenerative disc desiccation present at L2-L3 through L5-S1 with   intervertebral disc space narrowing noted at L3-L4. The conus medullaris terminates at L1. The visualized retroperitoneal/abdominal soft tissue  structures show no significant abnormalities.   T12-L1: No central canal or neuroforaminal stenosis. No disc protrusion or extrusion.   L1-L2: No central canal or neuroforaminal stenosis. No disc protrusion or extrusion.   L2-L3: No central canal or neuroforaminal stenosis. No disc protrusion or extrusion.   L3-L4: No central canal or neuroforaminal stenosis. No disc protrusion or extrusion.   L4-L5: There is a minimal broad disc bulge.  There is bilateral facet arthropathy.  There is minimal left ligamentum flavum thickening. No central canal or neuroforaminal stenosis. No disc protrusion or extrusion.   L5-S1: There is bilateral facet arthropathy. No central canal or neuroforaminal  stenosis. No disc protrusion or extrusion.  Impression   Mild multilevel degenerative change of the lower lumbar spine, primarily in the form of degenerative facet arthropathy.  No central canal stenosis or neuroforaminal stenosis at any lumbar level.     Assessment and Plan:  Renate Vang is a 62 y.o. woman with subjective anesthesia in the hands and occasional dysesthesias of the feet.  Her neurological exam is not impressive for neuropathy or a focal nerve lesion.      Her previous nerve conduction studies reported neuropathy but her history and physical are not consistent with this.  There was some suggestion of an ulnar neuropathy as well.    Will repeat NCS with EMG of both upper extremities and the right lower for further evaluation.  Consider blood work and imaging once this is completed.    Per patient AVS:  Will plan to get an follow up EMG to further evaluate whether neuropathy is really the issue and to also look for nerve impingement syndromes.  Your previous EMGs both show signs of a left ulnar neuropathy.  This could cause hand numbness.        Important to note, also  has a past medical history of Anxiety (resolved), Breast cancer, Depression, History of abnormal cervical Pap smear, History of basal cell carcinoma (03/2011), History of breast cancer (2003), History of HPV infection, History of parathyroidectomy, Kidney stones, Osteopenia, PONV (postoperative nausea and vomiting), and 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) (2012).    Time Spent: I spent a total of 48 minutes on the day of the visit.This includes face to face time and non-face to face time preparing to see the patient (eg, review of tests), Obtaining and/or reviewing separately obtained history, Documenting clinical information in the electronic or other health record, Independently interpreting resultsand communicating results to the patient/family/caregiver, or Care coordination.         Ester Palacios D.O, ABPN,  BOB AGUILERA    This note was created with voice recognition software.  Grammatical, syntax and spelling errors may be inevitable.

## 2022-11-29 NOTE — PATIENT INSTRUCTIONS
Will plan to get an follow up EMG to further evaluate whether neuropathy is really the issue and to also look for nerve impingement syndromes.  Your previous EMGs both show signs of a left ulnar neuropathy.  This could cause hand numbness.

## 2022-12-02 ENCOUNTER — TELEPHONE (OUTPATIENT)
Dept: OBSTETRICS AND GYNECOLOGY | Facility: CLINIC | Age: 62
End: 2022-12-02
Payer: COMMERCIAL

## 2022-12-02 ENCOUNTER — TELEPHONE (OUTPATIENT)
Dept: DERMATOLOGY | Facility: CLINIC | Age: 62
End: 2022-12-02
Payer: COMMERCIAL

## 2022-12-02 NOTE — TELEPHONE ENCOUNTER
----- Message from Maya Marcus sent at 12/2/2022  2:43 PM CST -----  Regarding: pt called  Name of Who is Calling: LAKSHMI CARRASCO [[id number]]      What is the request in detail:please place order for pt mammogram  she would like it scheduled in the morning before the end of he year. Please advise       Can the clinic reply by [facility]: No      What [facility] to [facility] Back [facility] [phone]

## 2022-12-02 NOTE — TELEPHONE ENCOUNTER
----- Message from Maya Marcus sent at 12/2/2022  2:46 PM CST -----  Regarding: pt called  Name of Who is Calling: LAKSHMI CARRASCO [[id number]]      What is the request in detail: pt has suspicious spot above eyebrow , back, and needs a skin screening. Pt is a former pt of  Dr Tan and needs an  appt  to be seen .Please advise       Can the clinic reply by [facility]: No      What Number to Call Back if not in [facility]: [phone]

## 2022-12-02 NOTE — TELEPHONE ENCOUNTER
LMOV in attempt to reach pt in reference to message pt left about scheduling her mammogram. LM informing pt that she can have her mammogram done the same day as her appointment on 12/6/22. LM for pt to call clinic back whenever she gets a chance.

## 2022-12-09 ENCOUNTER — PROCEDURE VISIT (OUTPATIENT)
Dept: NEUROLOGY | Facility: CLINIC | Age: 62
End: 2022-12-09
Payer: COMMERCIAL

## 2022-12-09 DIAGNOSIS — R20.0 NUMBNESS IN FEET: ICD-10-CM

## 2022-12-09 DIAGNOSIS — R20.2 NUMBNESS AND TINGLING IN BOTH HANDS: Primary | ICD-10-CM

## 2022-12-09 DIAGNOSIS — G56.90 NEUROPATHY OF UPPER EXTREMITY, UNSPECIFIED LATERALITY: ICD-10-CM

## 2022-12-09 DIAGNOSIS — R41.3 COMPLAINTS OF MEMORY DISTURBANCE: ICD-10-CM

## 2022-12-09 DIAGNOSIS — R20.0 NUMBNESS AND TINGLING IN BOTH HANDS: Primary | ICD-10-CM

## 2022-12-09 PROCEDURE — 95886 MUSC TEST DONE W/N TEST COMP: CPT | Mod: S$GLB,,, | Performed by: PSYCHIATRY & NEUROLOGY

## 2022-12-09 PROCEDURE — 95886 PR EMG COMPLETE, W/ NERVE CONDUCTION STUDIES, 5+ MUSCLES: ICD-10-PCS | Mod: S$GLB,,, | Performed by: PSYCHIATRY & NEUROLOGY

## 2022-12-09 PROCEDURE — 95913 NRV CNDJ TEST 13/> STUDIES: CPT | Mod: S$GLB,,, | Performed by: PSYCHIATRY & NEUROLOGY

## 2022-12-09 PROCEDURE — 95913 PR NERVE CONDUCTION STUDY; 13 OR MORE STUDIES: ICD-10-PCS | Mod: S$GLB,,, | Performed by: PSYCHIATRY & NEUROLOGY

## 2022-12-17 ENCOUNTER — LAB VISIT (OUTPATIENT)
Dept: LAB | Facility: HOSPITAL | Age: 62
End: 2022-12-17
Attending: PSYCHIATRY & NEUROLOGY
Payer: COMMERCIAL

## 2022-12-17 DIAGNOSIS — R41.3 COMPLAINTS OF MEMORY DISTURBANCE: ICD-10-CM

## 2022-12-17 DIAGNOSIS — G56.90 NEUROPATHY OF UPPER EXTREMITY, UNSPECIFIED LATERALITY: ICD-10-CM

## 2022-12-17 LAB
ALBUMIN SERPL BCP-MCNC: 4.2 G/DL (ref 3.5–5.2)
ALP SERPL-CCNC: 73 U/L (ref 55–135)
ALT SERPL W/O P-5'-P-CCNC: 20 U/L (ref 10–44)
ANION GAP SERPL CALC-SCNC: 10 MMOL/L (ref 8–16)
AST SERPL-CCNC: 24 U/L (ref 10–40)
BASOPHILS # BLD AUTO: 0.05 K/UL (ref 0–0.2)
BASOPHILS NFR BLD: 0.7 % (ref 0–1.9)
BILIRUB SERPL-MCNC: 0.6 MG/DL (ref 0.1–1)
BUN SERPL-MCNC: 15 MG/DL (ref 8–23)
CALCIUM SERPL-MCNC: 9.2 MG/DL (ref 8.7–10.5)
CHLORIDE SERPL-SCNC: 104 MMOL/L (ref 95–110)
CO2 SERPL-SCNC: 26 MMOL/L (ref 23–29)
CREAT SERPL-MCNC: 0.7 MG/DL (ref 0.5–1.4)
DIFFERENTIAL METHOD: NORMAL
EOSINOPHIL # BLD AUTO: 0.1 K/UL (ref 0–0.5)
EOSINOPHIL NFR BLD: 1.2 % (ref 0–8)
ERYTHROCYTE [DISTWIDTH] IN BLOOD BY AUTOMATED COUNT: 13.1 % (ref 11.5–14.5)
EST. GFR  (NO RACE VARIABLE): >60 ML/MIN/1.73 M^2
FOLATE SERPL-MCNC: 9.9 NG/ML (ref 4–24)
GLUCOSE SERPL-MCNC: 88 MG/DL (ref 70–110)
HCT VFR BLD AUTO: 41.1 % (ref 37–48.5)
HGB BLD-MCNC: 13.4 G/DL (ref 12–16)
IMM GRANULOCYTES # BLD AUTO: 0.01 K/UL (ref 0–0.04)
IMM GRANULOCYTES NFR BLD AUTO: 0.1 % (ref 0–0.5)
LYMPHOCYTES # BLD AUTO: 2.6 K/UL (ref 1–4.8)
LYMPHOCYTES NFR BLD: 34.2 % (ref 18–48)
MCH RBC QN AUTO: 29.5 PG (ref 27–31)
MCHC RBC AUTO-ENTMCNC: 32.6 G/DL (ref 32–36)
MCV RBC AUTO: 91 FL (ref 82–98)
MONOCYTES # BLD AUTO: 0.4 K/UL (ref 0.3–1)
MONOCYTES NFR BLD: 5.9 % (ref 4–15)
NEUTROPHILS # BLD AUTO: 4.4 K/UL (ref 1.8–7.7)
NEUTROPHILS NFR BLD: 57.9 % (ref 38–73)
NRBC BLD-RTO: 0 /100 WBC
PLATELET # BLD AUTO: 260 K/UL (ref 150–450)
PMV BLD AUTO: 10 FL (ref 9.2–12.9)
POTASSIUM SERPL-SCNC: 4 MMOL/L (ref 3.5–5.1)
PROT SERPL-MCNC: 7.1 G/DL (ref 6–8.4)
RBC # BLD AUTO: 4.54 M/UL (ref 4–5.4)
SODIUM SERPL-SCNC: 140 MMOL/L (ref 136–145)
VIT B12 SERPL-MCNC: 577 PG/ML (ref 210–950)
WBC # BLD AUTO: 7.52 K/UL (ref 3.9–12.7)

## 2022-12-17 PROCEDURE — 84207 ASSAY OF VITAMIN B-6: CPT | Performed by: PSYCHIATRY & NEUROLOGY

## 2022-12-17 PROCEDURE — 80053 COMPREHEN METABOLIC PANEL: CPT | Performed by: PSYCHIATRY & NEUROLOGY

## 2022-12-17 PROCEDURE — 86334 PATHOLOGIST INTERPRETATION IFE: ICD-10-PCS | Mod: 26,,, | Performed by: PATHOLOGY

## 2022-12-17 PROCEDURE — 86334 IMMUNOFIX E-PHORESIS SERUM: CPT | Mod: 26,,, | Performed by: PATHOLOGY

## 2022-12-17 PROCEDURE — 84165 PROTEIN E-PHORESIS SERUM: CPT | Mod: 26,,, | Performed by: PATHOLOGY

## 2022-12-17 PROCEDURE — 82607 VITAMIN B-12: CPT | Performed by: PSYCHIATRY & NEUROLOGY

## 2022-12-17 PROCEDURE — 84165 PATHOLOGIST INTERPRETATION SPE: ICD-10-PCS | Mod: 26,,, | Performed by: PATHOLOGY

## 2022-12-17 PROCEDURE — 82746 ASSAY OF FOLIC ACID SERUM: CPT | Performed by: PSYCHIATRY & NEUROLOGY

## 2022-12-17 PROCEDURE — 84165 PROTEIN E-PHORESIS SERUM: CPT | Performed by: PSYCHIATRY & NEUROLOGY

## 2022-12-17 PROCEDURE — 36415 COLL VENOUS BLD VENIPUNCTURE: CPT | Mod: PO | Performed by: PSYCHIATRY & NEUROLOGY

## 2022-12-17 PROCEDURE — 86334 IMMUNOFIX E-PHORESIS SERUM: CPT | Performed by: PSYCHIATRY & NEUROLOGY

## 2022-12-17 PROCEDURE — 85025 COMPLETE CBC W/AUTO DIFF WBC: CPT | Performed by: PSYCHIATRY & NEUROLOGY

## 2022-12-18 ENCOUNTER — PATIENT MESSAGE (OUTPATIENT)
Dept: CARDIOLOGY | Facility: CLINIC | Age: 62
End: 2022-12-18
Payer: COMMERCIAL

## 2022-12-18 DIAGNOSIS — R00.2 PALPITATIONS: ICD-10-CM

## 2022-12-18 DIAGNOSIS — I49.1 PREMATURE ATRIAL CONTRACTIONS: Primary | ICD-10-CM

## 2022-12-19 LAB
ALBUMIN SERPL ELPH-MCNC: 4.39 G/DL (ref 3.35–5.55)
ALPHA1 GLOB SERPL ELPH-MCNC: 0.25 G/DL (ref 0.17–0.41)
ALPHA2 GLOB SERPL ELPH-MCNC: 0.61 G/DL (ref 0.43–0.99)
B-GLOBULIN SERPL ELPH-MCNC: 0.71 G/DL (ref 0.5–1.1)
GAMMA GLOB SERPL ELPH-MCNC: 0.95 G/DL (ref 0.67–1.58)
INTERPRETATION SERPL IFE-IMP: NORMAL
PROT SERPL-MCNC: 6.9 G/DL (ref 6–8.4)

## 2022-12-19 RX ORDER — DILTIAZEM HYDROCHLORIDE 120 MG/1
120 CAPSULE, COATED, EXTENDED RELEASE ORAL DAILY
Qty: 30 CAPSULE | Refills: 11 | Status: SHIPPED | OUTPATIENT
Start: 2022-12-19 | End: 2023-05-29

## 2022-12-20 LAB
PATHOLOGIST INTERPRETATION IFE: NORMAL
PATHOLOGIST INTERPRETATION SPE: NORMAL

## 2022-12-22 LAB — PYRIDOXAL SERPL-MCNC: 58 UG/L (ref 5–50)

## 2022-12-27 NOTE — PROCEDURES
[facility]  [facility]  [address] [facility]  [place] [address]  [phone]      Full Name: Renate Vang Gender: Female  Patient ID: [id number] YOB: 1960      Visit Date: 12/9/2022 3:20 PM  Age: 62 Years  Examining Physician: Ester Palacios D.O., ABPN, AOBNP, ABEM   Referring Physician: Ester Palacios D.O.   Technologist: ELIEL Engel   Height: 5 feet 3 inch  History: Patient complaining of burning pain in the feet and numbness in bilateral hands.  Evaluate the right upper and lower extremities for peripheral neuropathy versus focal neuroapthy.      Sensory NCS      Nerve / Sites Rec. Site Onset Lat Peak Lat NP Amp Segments Distance Velocity Temp.     ms ms µV  cm m/s °C   L Median - Digit II (Antidromic)      Wrist Dig II 2.85 3.79 18.5 Wrist - Dig II 13 46 33      Ref.   ?3.80 ?10.0 Ref.  ?50    R Median - Digit II (Antidromic)      Wrist Dig II 3.00 3.52 16.6 Wrist - Dig II 13 43 33      Ref.   ?3.80 ?10.0 Ref.  ?50    L Ulnar - Digit V (Antidromic)      Wrist Dig V 2.33 3.27 19.4 Wrist - Dig V 11 47 33      Ref.   ?3.20 ?10.0 Ref.  ?50    R Ulnar - Digit V (Antidromic)      Wrist Dig V 2.44 3.04 11.9 Wrist - Dig V 11 45 33      Ref.   ?3.20 ?10.0 Ref.  ?50    R Sural - Ankle (Calf)      Calf Ankle 2.83 3.67 7.3 Calf - Ankle 14 49 33      Ref.   ?4.60 ?3.0 Ref.  ?40    R Superficial peroneal - Ankle      Lat leg Ankle 2.44 3.10 5.2 Lat leg - Ankle 12 49 33      Ref.   ?4.60 ?3.0 Ref.          Motor NCS      Nerve / Sites Muscle Latency Ref. Amplitude Ref. Amp % Duration Segments Distance Lat Diff Ref. Velocity Ref. Temp.     ms ms mV mV % ms  cm ms ms m/s m/s °C   L Median - APB      Wrist APB 3.88 ?4.00 6.9 ?5.0 100 5.04 Wrist - APB      33      Elbow APB 7.98  6.9  100 5.31 Elbow - Wrist 20.5 4.10  50 ?50 33   R Median - APB      Wrist APB 3.67 ?4.00 7.3 ?5.0 100 6.23 Wrist - APB      33      Elbow APB 7.71  6.7  92.4 6.44 Elbow - Wrist 22  4.04  54 ?50 33   L Ulnar - ADM      Wrist ADM 2.92 ?3.10 11.4 ?7.0 100 5.75 Wrist - ADM      33      B.Elbow ADM 6.02  11.2  98.7 6.25 B.Elbow - Wrist 16 3.10  52 ?50 33      A.Elbow ADM 7.90  10.9  96.2 5.85 A.Elbow - B.Elbow 10 1.87  53  33   R Ulnar - ADM      Wrist ADM 3.08 ?3.10 10.2 ?7.0 100 6.77 Wrist - ADM      33      B.Elbow ADM 6.23  10.0  98.2 6.81 B.Elbow - Wrist 17.5 3.15  56 ?50 33      A.Elbow ADM 7.88  9.9  97.3 6.73 A.Elbow - B.Elbow 10 1.65  61  33   R Peroneal - EDB      Ankle EDB 4.90 ?6.00 6.5 ?2.5 100 6.92 Ankle - EDB      33      Fib head EDB 11.92  5.7  88.4 7.19 Fib head - Ankle 31.5 7.02  45 ?40 33      Pop fossa EDB 14.15  4.1  63.3  Pop fossa - Fib head 10 2.23  45  33   R Tibial - AH      Ankle AH 4.79 ?6.00 9.0 ?4.0 100 8.06 Ankle - AH      33      Pop fossa AH 13.81  6.0  65.9 9.19 Pop fossa - Ankle 37.5 9.02  42 ?40 33   L Median, Ulnar - Lumbrical-Interossei      Median Wrist Lumb II 3.69  1.2  100 7.33 Median Wrist - Lumb II 10     33      Ulnar Wrist Lumb II 3.81  3.2  271 5.56 Ulnar Wrist - Lumb II 10     33           Median Wrist - Ulnar Wrist  -0.13 ?0.50   33   R Median, Ulnar - Lumbrical-Interossei      Median Wrist Lumb II 3.71  0.8  100 6.92 Median Wrist - Lumb II 10     33      Ulnar Wrist Lumb II 3.67  6.0  759 5.08 Ulnar Wrist - Lumb II 10     33           Median Wrist - Ulnar Wrist  0.04 ?0.50   33       F  Wave      Nerve Fmin Ref.    ms ms   L Median - APB 26.98 ?31.00   L Ulnar - ADM 25.83 ?32.00   R Median - APB 26.41 ?31.00   R Ulnar - ADM 25.89 ?32.00   R Peroneal - EDB 49.74 ?56.00   R Tibial - AH 41.41 ?56.00       EMG Summary Table     Spontaneous Recruitment Activation Duration Amplitude Polyphasia Comment   Muscle Ins Act Fib Fasc Pattern - - - - -   R. First dorsal interosseous Normal 0 0 Normal Normal Normal Normal Normal Normal   R. Abductor pollicis brevis Normal 0 0 Normal Normal Normal Normal Normal Normal   R. Pronator teres Normal 0 0 Normal Normal  Normal Normal Normal Normal   R. Biceps brachii Normal 0 0 Normal Normal Normal Normal Normal Normal   R. Triceps brachii Normal 0 0 Normal Normal Normal Normal Normal Normal   R. Deltoid Normal 0 0 Normal Normal Normal Normal Normal Normal   R. Tibialis anterior Normal 0 0 Normal Normal Normal Normal Normal Normal   R. Gastrocnemius (Medial head) Normal 0 0 Normal Normal Normal Normal Normal Normal   R. Extensor hallucis longus Normal 0 0 Normal Normal Normal Normal Normal Normal   R. Vastus medialis Normal 0 0 Normal Normal Normal Normal Normal Normal   R. Tensor fasciae latae Normal 0 0 Normal Normal Normal Normal Normal Normal   R. Lumbar paraspinals Normal 0 0      Normal   R. Cervical paraspinals Normal 0 0      Normal   L. First dorsal interosseous Normal 0 0 Normal Normal Normal Normal Normal Normal   L. Abductor pollicis Normal 0 0 Normal Normal Normal Normal Normal Normal   L. Pronator teres Normal 0 0 Normal Normal Normal Normal Normal Normal   L. Biceps brachii Normal 0 0 Normal Normal Normal Normal Normal Normal   L. Triceps brachii Normal 0 0 Normal Normal Normal Normal Normal Normal   L. Deltoid Normal 0 0 Normal Normal Normal Normal Normal Normal         Renate Vang [id number] 12/9/2022 3:20 PM     5 of 5    Summary:  Nerve conduction studies were performed in the right upper and lower extremities with comparison studies in the left upper extremity.  Bilateral median sensory responses are normal in amplitude and latency.  Left ulnar sensory response was borderline prolonged with normal amplitude.  Right ulnar sensory response was normal in amplitude and latency.  Right sural and superficial peroneal sensory responses were normal in amplitude and latency.  Bilateral median and ulnar motor responses were normal in amplitude, latency and velocity.  Right peroneal and tibial motor responses were normal in amplitude, latency and velocity.  Due to hand numbness and concerns for carpal tunnel syndrome further  internal comparison studies were performed.  Bilateral median versus ulnar 2nd lumbrical interosseous comparison studies revealed no significant difference in latency between the median and ulnar motor nerves.  Bilateral median and ulnar minimal F wave latencies were normal.  Right peroneal and tibial minimal F-wave latencies were normal.  Needle EMG was performed in the right upper and lower extremities with additional studies of the left upper extremity.  No active denervation was present in any muscle tested.  Motor unit morphology and recruitment patterns were normal in every muscle tested.    Impression:  This is an essentially normal EMG of the right upper and lower extremities with additional studies of the left upper extremity.  The borderline prolongation of the left ulnar sensory response could be suggestive of a left ulnar neuropathy, but is not diagnostic at this time.  Clinical correlation is advised.  There is no evidence of peripheral neuropathy, any other focal neuropathy, plexopathy or radiculopathy on the study.    Thank you for referring to the [facility] in [place]. Please feel free to contact the clinic if you have any further questions regarding this study or report.       _____________________________  Ester Palacios D.O., ABPN, AOBNP, BOB Vang [id number] 12/9/2022 3:20 PM     5 of 5

## 2023-01-31 ENCOUNTER — OFFICE VISIT (OUTPATIENT)
Dept: OBSTETRICS AND GYNECOLOGY | Facility: CLINIC | Age: 63
End: 2023-01-31
Payer: COMMERCIAL

## 2023-01-31 ENCOUNTER — PATIENT MESSAGE (OUTPATIENT)
Dept: OBSTETRICS AND GYNECOLOGY | Facility: CLINIC | Age: 63
End: 2023-01-31

## 2023-01-31 VITALS
WEIGHT: 152.75 LBS | DIASTOLIC BLOOD PRESSURE: 74 MMHG | SYSTOLIC BLOOD PRESSURE: 118 MMHG | BODY MASS INDEX: 27.07 KG/M2 | HEIGHT: 63 IN

## 2023-01-31 DIAGNOSIS — Z12.31 ENCOUNTER FOR SCREENING MAMMOGRAM FOR BREAST CANCER: ICD-10-CM

## 2023-01-31 DIAGNOSIS — Z17.0 ESTROGEN RECEPTOR POSITIVE STATUS (ER+): ICD-10-CM

## 2023-01-31 DIAGNOSIS — N90.89 VULVAR LESION: ICD-10-CM

## 2023-01-31 DIAGNOSIS — A63.0 HPV (HUMAN PAPILLOMA VIRUS) ANOGENITAL INFECTION: ICD-10-CM

## 2023-01-31 DIAGNOSIS — F41.9 ANXIETY: ICD-10-CM

## 2023-01-31 DIAGNOSIS — Z85.3 HISTORY OF BREAST CANCER: ICD-10-CM

## 2023-01-31 DIAGNOSIS — Z01.419 ENCOUNTER FOR WELL WOMAN EXAM WITH ROUTINE GYNECOLOGICAL EXAM: Primary | ICD-10-CM

## 2023-01-31 PROCEDURE — 88341 IMHCHEM/IMCYTCHM EA ADD ANTB: CPT | Performed by: PATHOLOGY

## 2023-01-31 PROCEDURE — 88305 TISSUE EXAM BY PATHOLOGIST: CPT | Performed by: PATHOLOGY

## 2023-01-31 PROCEDURE — 99396 PR PREVENTIVE VISIT,EST,40-64: ICD-10-PCS | Mod: S$GLB,,, | Performed by: OBSTETRICS & GYNECOLOGY

## 2023-01-31 PROCEDURE — 88305 TISSUE EXAM BY PATHOLOGIST: ICD-10-PCS | Mod: 26,,, | Performed by: PATHOLOGY

## 2023-01-31 PROCEDURE — 99999 PR PBB SHADOW E&M-EST. PATIENT-LVL IV: ICD-10-PCS | Mod: PBBFAC,,, | Performed by: OBSTETRICS & GYNECOLOGY

## 2023-01-31 PROCEDURE — 99999 PR PBB SHADOW E&M-EST. PATIENT-LVL IV: CPT | Mod: PBBFAC,,, | Performed by: OBSTETRICS & GYNECOLOGY

## 2023-01-31 PROCEDURE — 88312 SPECIAL STAINS GROUP 1: CPT | Performed by: PATHOLOGY

## 2023-01-31 PROCEDURE — 88312 SPECIAL STAINS GROUP 1: CPT | Mod: 26,,, | Performed by: PATHOLOGY

## 2023-01-31 PROCEDURE — 88342 IMHCHEM/IMCYTCHM 1ST ANTB: CPT | Mod: 26,,, | Performed by: PATHOLOGY

## 2023-01-31 PROCEDURE — 88305 TISSUE EXAM BY PATHOLOGIST: CPT | Mod: 26,,, | Performed by: PATHOLOGY

## 2023-01-31 PROCEDURE — 88364 INSITU HYBRIDIZATION (FISH): CPT | Performed by: PATHOLOGY

## 2023-01-31 PROCEDURE — 88342 IMHCHEM/IMCYTCHM 1ST ANTB: CPT | Performed by: PATHOLOGY

## 2023-01-31 PROCEDURE — 87624 HPV HI-RISK TYP POOLED RSLT: CPT | Performed by: OBSTETRICS & GYNECOLOGY

## 2023-01-31 PROCEDURE — 88312 PR  SPECIAL STAINS,GROUP I: ICD-10-PCS | Mod: 26,,, | Performed by: PATHOLOGY

## 2023-01-31 PROCEDURE — 88341 IMHCHEM/IMCYTCHM EA ADD ANTB: CPT | Mod: 26,,, | Performed by: PATHOLOGY

## 2023-01-31 PROCEDURE — 88341 PR IHC OR ICC EACH ADD'L SINGLE ANTIBODY  STAINPR: ICD-10-PCS | Mod: 26,,, | Performed by: PATHOLOGY

## 2023-01-31 PROCEDURE — 88342 CHG IMMUNOCYTOCHEMISTRY: ICD-10-PCS | Mod: 26,,, | Performed by: PATHOLOGY

## 2023-01-31 PROCEDURE — 88365 INSITU HYBRIDIZATION (FISH): CPT | Performed by: PATHOLOGY

## 2023-01-31 PROCEDURE — 99396 PREV VISIT EST AGE 40-64: CPT | Mod: S$GLB,,, | Performed by: OBSTETRICS & GYNECOLOGY

## 2023-01-31 PROCEDURE — 88175 CYTOPATH C/V AUTO FLUID REDO: CPT | Performed by: OBSTETRICS & GYNECOLOGY

## 2023-01-31 RX ORDER — AMOXICILLIN 500 MG
CAPSULE ORAL DAILY
COMMUNITY
End: 2024-03-04

## 2023-01-31 NOTE — PROGRESS NOTES
Chief Complaint   Patient presents with    Well Woman    Mammogram       History of Present Illness: Renate Vang is a 62 y.o. female that presents today 2023 for well gyn visit.    Past Medical History:   Diagnosis Date    Abnormal Pap smear of cervix     Anxiety resolved    Breast cancer     Depression     History of abnormal cervical Pap smear     History of basal cell carcinoma 2011    right axilla    History of breast cancer 2003    at 42 s/p surg and radiation T1N0M0 ER+    History of HPV infection     LOW RISK    History of parathyroidectomy     R lower gland removed due to adenoma    Kidney stones     Osteopenia     last dexa - refuses additional testing     PONV (postoperative nausea and vomiting)     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)        Past Surgical History:   Procedure Laterality Date    AUGMENTATION OF BREAST      BREAST BIOPSY      BREAST LUMPECTOMY  2003    x2 Right with senital node bx, with radiation    BREAST RECONSTRUCTION      s/p lumpectomy to replace previous implants bilaterally    BREAST SURGERY      breast augmentation prior to lumpectomy    cataract surgery       SECTION, LOW TRANSVERSE      x2    COLONOSCOPY N/A 2018    Procedure: COLONOSCOPY;  Surgeon: Khanh Forrester Jr., MD;  Location: [facility];  Service: Endoscopy;  Laterality: N/A;    COLONOSCOPY W/ POLYPECTOMY  2013    HERBIE.   One 1 mm polyp at the hepatic flexure. TUBULAR ADENOMATOUS POLYP.  One 1 mm polyp in the cecum. TUBULAR ADENOMATOUS POLYP.  The examination was otherwise normal.      HERNIA REPAIR      bilateral groin     LASER ABLATION OF CONDYLOMAS      MALIGNANT SKIN LESION EXCISION      nose & under right arm    PARATHYROID GLAND SURGERY      R lower gland removed due to adenoma    TONSILLECTOMY         Current Outpatient Medications   Medication Sig Dispense Refill    calcium-vitamin D (OSCAL) 250 (625)-125 mg-unit per tablet Take 1 tablet by mouth once daily.       diltiaZEM (CARDIZEM CD) 120 MG Cp24 Take 1 capsule (120 mg total) by mouth once daily. 30 capsule 11    glucosamine-chondroitin 500-400 mg tablet Take 1 tablet by mouth 2 (two) times daily.      Lactobacillus rhamnosus GG (CULTURELLE) 10 billion cell capsule Take 1 capsule by mouth once daily.      omega-3 fatty acids/fish oil (FISH OIL-OMEGA-3 FATTY ACIDS) 300-1,000 mg capsule Take by mouth once daily.      RESTASIS 0.05 % ophthalmic emulsion       vit A/vit C/vit E/zinc/copper (ICAPS AREDS ORAL) Take by mouth.      vitamin D (VITAMIN D3) 1000 units Tab Take 600 Units by mouth once daily.       zinc gluconate 50 mg tablet Take 25 mg by mouth once daily.       ALPRAZolam (XANAX) 0.25 MG tablet Take 1 tablet (0.25 mg total) by mouth 2 (two) times daily as needed for Anxiety. 40 tablet 0     No current facility-administered medications for this visit.       Review of patient's allergies indicates:   Allergen Reactions    Propranolol Other (See Comments)     Memory     Wellbutrin [bupropion hcl] Other (See Comments)     Memory loss     Adhesive Rash     Blisters with band-aids and surgical dressing       Family History   Problem Relation Age of Onset    Glaucoma Mother     Osteoarthritis Mother     Arrhythmia Mother     Fibromyalgia Mother     Kidney disease Mother     Cancer Maternal Uncle         colon    Hypertension Father     Hashimoto's thyroiditis Sister     Anxiety disorder Sister     Heart disease Maternal Grandmother     COPD Maternal Grandmother     Tuberculosis Maternal Grandmother     Hypertension Maternal Grandmother     COPD Maternal Grandfather     Osteoarthritis Maternal Grandfather     COPD Paternal Grandfather     Breast cancer Cousin     Ovarian cancer Neg Hx        Social History     Socioeconomic History    Marital status:    Occupational History     Employer: [employer]   Tobacco Use    Smoking status: Never    Smokeless tobacco: Never   Substance and Sexual Activity     Alcohol use: Yes     Alcohol/week: 4.0 standard drinks     Types: 4 Glasses of wine per week     Comment: 2 drinks three times per week    Drug use: Yes     Types: Benzodiazepines    Sexual activity: Yes     Partners: Male     Birth control/protection: Post-menopausal   Other Topics Concern    Are you pregnant or think you may be? No    Breast-feeding No     Social Determinants of Health     Financial Resource Strain: Low Risk     Difficulty of Paying Living Expenses: Not hard at all   Food Insecurity: No Food Insecurity    Worried About Running Out of Food in the Last Year: Never true    Ran Out of Food in the Last Year: Never true   Transportation Needs: No Transportation Needs    Lack of Transportation (Medical): No    Lack of Transportation (Non-Medical): No   Physical Activity: Insufficiently Active    Days of Exercise per Week: 1 day    Minutes of Exercise per Session: 60 min   Stress: No Stress Concern Present    Feeling of Stress : Only a little   Social Connections: Unknown    Frequency of Communication with Friends and Family: More than three times a week    Frequency of Social Gatherings with Friends and Family: Twice a week    Active Member of Clubs or Organizations: Yes    Attends Club or Organization Meetings: More than 4 times per year    Marital Status:    Housing Stability: Low Risk     Unable to Pay for Housing in the Last Year: No    Number of Places Lived in the Last Year: 1    Unstable Housing in the Last Year: No       OB History    Para Term  AB Living   5 4 2   1     SAB IAB Ectopic Multiple Live Births     1            # Outcome Date GA Lbr Manfred/2nd Weight Sex Delivery Anes PTL Lv   5 Term            4 Term            3 IAB            2 Para      CS-LTranv      1 Para      CS-LTranv          Review of Symptoms:  GENERAL: Denies weight gain or weight loss. Feeling well overall.   SKIN: Denies rash or lesions.   HEAD: Denies head injury or headache.   NODES: Denies enlarged  "lymph nodes.   CHEST: Denies chest pain or shortness of breath.   CARDIOVASCULAR: Denies palpitations or left sided chest pain.   ABDOMEN: No abdominal pain, constipation, diarrhea, nausea, vomiting or rectal bleeding.   URINARY: No frequency, dysuria, hematuria, or burning on urination.  HEMATOLOGIC: No easy bruisability or excessive bleeding.   MUSCULOSKELETAL: Denies joint pain or swelling.     /74   Ht 5' 3" (1.6 m)   Wt 69.3 kg (152 lb 12.5 oz)   LMP 09/20/2006   Physical Exam:  APPEARANCE: Well nourished, well developed, in no acute distress.  SKIN: Normal skin turgor, no lesions.  NECK: Neck symmetric without masses   RESPIRATORY: Normal respiratory effort with no retractions or use of accessory muscles  CARDIOVASCULAR: Peripheral vascular system with no swelling no varicosities and palpation of pulses normal  LYMPHATIC: No enlargements of the lymph nodes noted in the neck, axillae, or groin  ABDOMEN: Soft. No tenderness or masses. No hepatosplenomegaly. No hernias.  BREASTS: Symmetrical, no skin changes or visible lesions. No palpable masses, nipple discharge or adenopathy bilaterally.  PELVIC: Normal external female genitalia without lesions. Normal hair distribution. Adequate perineal body, normal urethral meatus. Urethra with no masses.  Bladder nontender. Vagina moist and well rugated without lesions or discharge. Cervix pink and without lesions. No significant cystocele or rectocele. Bimanual exam showed uterus normal size, shape, position, mobile and nontender. Adnexa without masses or tenderness. Urethra and bladder normal.   EXTREMITIES: No clubbing cyanosis or edema.    ASSESSMENT/PLAN:  Encounter for well woman exam with routine gynecological exam  -     Liquid-Based Pap Smear, Screening  -     HPV High Risk Genotypes, PCR    Encounter for screening mammogram for breast cancer  -     Mammo Digital Screening Bilat w/ Scottie; Future; Expected date: 01/31/2023          Patient was counseled " today on Pelvic exams and Pap Smear guidelines.   We discussed STD screening if at high risk for a STD.  We discussed recommendation for breast cancer screening with mammogram every other year after the age of 40 and annually after the age of 50.    We discussed colon cancer screening when indicated.   Osteoporosis screening discussed when indicated.   She was advised to see her primary care physician for all other health maintenance.     FOLLOW-UP with me for next routine visit.

## 2023-02-01 RX ORDER — ALPRAZOLAM 0.25 MG/1
0.25 TABLET ORAL 2 TIMES DAILY PRN
Qty: 40 TABLET | Refills: 0 | Status: SHIPPED | OUTPATIENT
Start: 2023-02-01 | End: 2023-10-12 | Stop reason: SDUPTHER

## 2023-02-02 ENCOUNTER — HOSPITAL ENCOUNTER (OUTPATIENT)
Dept: RADIOLOGY | Facility: HOSPITAL | Age: 63
Discharge: HOME | End: 2023-02-02
Attending: OBSTETRICS & GYNECOLOGY
Payer: COMMERCIAL

## 2023-02-02 DIAGNOSIS — Z12.31 ENCOUNTER FOR SCREENING MAMMOGRAM FOR BREAST CANCER: ICD-10-CM

## 2023-02-02 PROCEDURE — 77067 SCR MAMMO BI INCL CAD: CPT | Mod: 26,,, | Performed by: RADIOLOGY

## 2023-02-02 PROCEDURE — 77067 MAMMO DIGITAL SCREENING BILAT WITH TOMO: ICD-10-PCS | Mod: 26,,, | Performed by: RADIOLOGY

## 2023-02-02 PROCEDURE — 77067 SCR MAMMO BI INCL CAD: CPT | Mod: TC,PN

## 2023-02-02 PROCEDURE — 77063 MAMMO DIGITAL SCREENING BILAT WITH TOMO: ICD-10-PCS | Mod: 26,,, | Performed by: RADIOLOGY

## 2023-02-02 PROCEDURE — 77063 BREAST TOMOSYNTHESIS BI: CPT | Mod: 26,,, | Performed by: RADIOLOGY

## 2023-02-09 ENCOUNTER — TELEPHONE (OUTPATIENT)
Dept: OBSTETRICS AND GYNECOLOGY | Facility: CLINIC | Age: 63
End: 2023-02-09
Payer: COMMERCIAL

## 2023-02-10 ENCOUNTER — PATIENT MESSAGE (OUTPATIENT)
Dept: OBSTETRICS AND GYNECOLOGY | Facility: CLINIC | Age: 63
End: 2023-02-10
Payer: COMMERCIAL

## 2023-02-23 LAB
FINAL PATHOLOGIC DIAGNOSIS: NORMAL
GROSS: NORMAL
Lab: NORMAL
MICROSCOPIC EXAM: NORMAL

## 2023-04-03 ENCOUNTER — OFFICE VISIT (OUTPATIENT)
Dept: FAMILY MEDICINE | Facility: CLINIC | Age: 63
End: 2023-04-03
Payer: COMMERCIAL

## 2023-04-03 VITALS
DIASTOLIC BLOOD PRESSURE: 70 MMHG | HEART RATE: 81 BPM | BODY MASS INDEX: 25.21 KG/M2 | TEMPERATURE: 98 F | SYSTOLIC BLOOD PRESSURE: 118 MMHG | RESPIRATION RATE: 20 BRPM | HEIGHT: 63 IN | WEIGHT: 142.31 LBS | OXYGEN SATURATION: 97 %

## 2023-04-03 DIAGNOSIS — R31.9 HEMATURIA, UNSPECIFIED TYPE: ICD-10-CM

## 2023-04-03 DIAGNOSIS — R39.9 UTI SYMPTOMS: ICD-10-CM

## 2023-04-03 DIAGNOSIS — R10.9 FLANK PAIN: Primary | ICD-10-CM

## 2023-04-03 DIAGNOSIS — R10.32 LEFT LOWER QUADRANT ABDOMINAL PAIN: ICD-10-CM

## 2023-04-03 LAB
BILIRUBIN, UA POC OHS: NEGATIVE
BLOOD, UA POC OHS: ABNORMAL
CLARITY, UA POC OHS: CLEAR
COLOR, UA POC OHS: YELLOW
GLUCOSE, UA POC OHS: NEGATIVE
KETONES, UA POC OHS: NEGATIVE
LEUKOCYTES, UA POC OHS: NEGATIVE
NITRITE, UA POC OHS: NEGATIVE
PH, UA POC OHS: 7
PROTEIN, UA POC OHS: NEGATIVE
SPECIFIC GRAVITY, UA POC OHS: 1.01
UROBILINOGEN, UA POC OHS: 0.2

## 2023-04-03 PROCEDURE — 81003 POCT URINALYSIS(INSTRUMENT): ICD-10-PCS | Mod: QW,S$GLB,, | Performed by: NURSE PRACTITIONER

## 2023-04-03 PROCEDURE — 99213 PR OFFICE/OUTPT VISIT, EST, LEVL III, 20-29 MIN: ICD-10-PCS | Mod: S$GLB,,, | Performed by: NURSE PRACTITIONER

## 2023-04-03 PROCEDURE — 81003 URINALYSIS AUTO W/O SCOPE: CPT | Mod: QW,S$GLB,, | Performed by: NURSE PRACTITIONER

## 2023-04-03 PROCEDURE — 99213 OFFICE O/P EST LOW 20 MIN: CPT | Mod: S$GLB,,, | Performed by: NURSE PRACTITIONER

## 2023-04-03 NOTE — PROGRESS NOTES
Subjective:       Patient ID: Renate Vang is a 62 y.o. female.    Chief Complaint: Back Pain    HPI New patient to me. History of kidney stones. Having more Left flank pain off and on. Two weeks ago had flare up and it lasted about 1 day.  Then again Saturday. Took a pain pill with nausea medication. Took motrin and used heating pad. Last CT scan was in 2020 with bilateral non-obstructing stones.  Pain not worse with movement. No heavy lifting or injury. She does have issues with constipation at times. Will take a stool softener prn. Hydrates with about 30-36 oz water daily. See ROS    The following portion of the patients history was reviewed and updated as appropriate: allergies, current medications, past medical and surgical history. Past social history and problem list reviewed. Family PMH and Past social history reviewed. Tobacco, Illicit drug use reviewed.      Review of patient's allergies indicates:   Allergen Reactions    Propranolol Other (See Comments)     Memory     Wellbutrin [bupropion hcl] Other (See Comments)     Memory loss     Adhesive Rash     Blisters with band-aids and surgical dressing         Current Outpatient Medications:     ALPRAZolam (XANAX) 0.25 MG tablet, Take 1 tablet (0.25 mg total) by mouth 2 (two) times daily as needed for Anxiety., Disp: 40 tablet, Rfl: 0    calcium-vitamin D (OSCAL) 250 (625)-125 mg-unit per tablet, Take 1 tablet by mouth once daily., Disp: , Rfl:     diltiaZEM (CARDIZEM CD) 120 MG Cp24, Take 1 capsule (120 mg total) by mouth once daily., Disp: 30 capsule, Rfl: 11    glucosamine-chondroitin 500-400 mg tablet, Take 1 tablet by mouth 2 (two) times daily., Disp: , Rfl:     Lactobacillus rhamnosus GG (CULTURELLE) 10 billion cell capsule, Take 1 capsule by mouth once daily., Disp: , Rfl:     omega-3 fatty acids/fish oil (FISH OIL-OMEGA-3 FATTY ACIDS) 300-1,000 mg capsule, Take by mouth once daily., Disp: , Rfl:     RESTASIS 0.05 % ophthalmic emulsion, , Disp: ,  Rfl:     vit A/vit C/vit E/zinc/copper (ICAPS AREDS ORAL), Take by mouth., Disp: , Rfl:     vitamin D (VITAMIN D3) 1000 units Tab, Take 600 Units by mouth once daily. , Disp: , Rfl:     zinc gluconate 50 mg tablet, Take 25 mg by mouth once daily. , Disp: , Rfl:     Past Medical History:   Diagnosis Date    Abnormal Pap smear of cervix     Anxiety resolved    Breast cancer     Depression     History of abnormal cervical Pap smear     History of basal cell carcinoma 2011    right axilla    History of breast cancer 2003    at 42 s/p surg and radiation T1N0M0 ER+    History of HPV infection     LOW RISK    History of parathyroidectomy     R lower gland removed due to adenoma    Kidney stones     Osteopenia     last dexa - refuses additional testing     PONV (postoperative nausea and vomiting)     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)        Past Surgical History:   Procedure Laterality Date    AUGMENTATION OF BREAST      BREAST BIOPSY      BREAST LUMPECTOMY  2003    x2 Right with senital node bx, with radiation    BREAST RECONSTRUCTION      s/p lumpectomy to replace previous implants bilaterally    BREAST SURGERY      breast augmentation prior to lumpectomy    cataract surgery       SECTION, LOW TRANSVERSE      x2    COLONOSCOPY N/A 2018    Procedure: COLONOSCOPY;  Surgeon: Khanh Forrester Jr., MD;  Location: [facility];  Service: Endoscopy;  Laterality: N/A;    COLONOSCOPY W/ POLYPECTOMY  2013    HERBIE.   One 1 mm polyp at the hepatic flexure. TUBULAR ADENOMATOUS POLYP.  One 1 mm polyp in the cecum. TUBULAR ADENOMATOUS POLYP.  The examination was otherwise normal.      HERNIA REPAIR      bilateral groin     LASER ABLATION OF CONDYLOMAS      MALIGNANT SKIN LESION EXCISION      nose & under right arm    PARATHYROID GLAND SURGERY      R lower gland removed due to adenoma    TONSILLECTOMY         Social History     Socioeconomic History    Marital status:    Occupational  History     Employer: [employer]   Tobacco Use    Smoking status: Never    Smokeless tobacco: Never   Substance and Sexual Activity    Alcohol use: Yes     Alcohol/week: 4.0 standard drinks     Types: 4 Glasses of wine per week     Comment: 2 drinks three times per week    Drug use: Yes     Types: Benzodiazepines    Sexual activity: Yes     Partners: Male     Birth control/protection: Post-menopausal   Other Topics Concern    Are you pregnant or think you may be? No    Breast-feeding No     Social Determinants of Health     Financial Resource Strain: Low Risk     Difficulty of Paying Living Expenses: Not hard at all   Food Insecurity: No Food Insecurity    Worried About Running Out of Food in the Last Year: Never true    Ran Out of Food in the Last Year: Never true   Transportation Needs: No Transportation Needs    Lack of Transportation (Medical): No    Lack of Transportation (Non-Medical): No   Physical Activity: Insufficiently Active    Days of Exercise per Week: 1 day    Minutes of Exercise per Session: 60 min   Stress: No Stress Concern Present    Feeling of Stress : Only a little   Social Connections: Unknown    Frequency of Communication with Friends and Family: More than three times a week    Frequency of Social Gatherings with Friends and Family: Twice a week    Active Member of Clubs or Organizations: Yes    Attends Club or Organization Meetings: More than 4 times per year    Marital Status:    Housing Stability: Low Risk     Unable to Pay for Housing in the Last Year: No    Number of Places Lived in the Last Year: 1    Unstable Housing in the Last Year: No       Review of Systems   Constitutional:  Negative for fatigue and fever.   Respiratory:  Negative for cough, chest tightness, shortness of breath and wheezing.    Cardiovascular:  Negative for chest pain, palpitations and leg swelling.   Gastrointestinal:  Negative for abdominal pain, blood in stool, constipation, diarrhea, nausea and  "vomiting.   Genitourinary:  Positive for flank pain. Negative for decreased urine volume, difficulty urinating and dysuria.   Musculoskeletal:  Negative for arthralgias, back pain and gait problem.        Left sided back pain   Skin:  Negative for rash and wound.   Neurological:  Negative for dizziness, weakness and headaches.   Hematological:  Negative for adenopathy. Does not bruise/bleed easily.   Psychiatric/Behavioral:  Negative for dysphoric mood and sleep disturbance. The patient is not nervous/anxious.      Objective:      /70   Pulse 81   Temp 98.1 °F (36.7 °C)   Resp 20   Ht 5' 3" (1.6 m)   Wt 64.5 kg (142 lb 4.9 oz)   LMP 09/20/2006   SpO2 97%   BMI 25.21 kg/m²      Physical Exam  Vitals reviewed.   Constitutional:       General: She is not in acute distress.     Appearance: Normal appearance. She is well-developed and normal weight.   HENT:      Head: Normocephalic.   Neck:      Thyroid: No thyromegaly.      Vascular: No carotid bruit.      Trachea: Trachea normal. No tracheal tenderness or tracheal deviation.   Cardiovascular:      Rate and Rhythm: Normal rate and regular rhythm.      Pulses: Normal pulses.           Carotid pulses are 2+ on the right side and 2+ on the left side.       Radial pulses are 2+ on the right side and 2+ on the left side.      Heart sounds: Normal heart sounds. No murmur heard.    No gallop.   Pulmonary:      Effort: Pulmonary effort is normal. No respiratory distress.      Breath sounds: Normal breath sounds. No stridor. No wheezing, rhonchi or rales.   Abdominal:      General: Bowel sounds are normal.      Palpations: Abdomen is soft. There is no splenomegaly or mass.      Tenderness: There is no abdominal tenderness. There is left CVA tenderness. There is no right CVA tenderness, guarding or rebound.   Musculoskeletal:         General: Normal range of motion.      Cervical back: Full passive range of motion without pain, normal range of motion and neck " supple.      Thoracic back: Tenderness (over laft flank area) present. No spasms. Normal range of motion.      Lumbar back: No spasms. Normal range of motion. Negative right straight leg raise test and negative left straight leg raise test.      Right lower leg: No edema.      Left lower leg: No edema.      Comments: Gait and coordination normal.  strong, equal. Upper and lower extremity strength normal.    Skin:     General: Skin is warm and dry.      Capillary Refill: Capillary refill takes less than 2 seconds.   Neurological:      General: No focal deficit present.      Mental Status: She is alert and oriented to person, place, and time.      Gait: Gait normal.   Psychiatric:         Attention and Perception: Attention and perception normal.         Mood and Affect: Mood and affect normal.         Speech: Speech normal.         Behavior: Behavior normal.       Assessment:       1. Flank pain    2. Hematuria, unspecified type    3. Left lower quadrant abdominal pain    4. UTI symptoms        Plan:       Flank pain: mild hematuria.   -     POCT Urinalysis(Instrument)    Hematuria, unspecified type: mild no other indication of infection.    Left lower quadrant abdominal pain: will get CT to rule out kidney stone  -     CT Renal Stone Study ABD Pelvis WO; Future; Expected date: 04/03/2023    UTI symptoms  -     POCT Urinalysis(Instrument)    Continue current medication  Take medications only as prescribed  Healthy diet, exercise  Adequate rest  Adequate hydration  Avoid allergens  Avoid excessive caffeine      Follow up if symptoms worsen.

## 2023-04-04 ENCOUNTER — HOSPITAL ENCOUNTER (OUTPATIENT)
Dept: RADIOLOGY | Facility: HOSPITAL | Age: 63
Discharge: HOME | End: 2023-04-04
Attending: NURSE PRACTITIONER
Payer: COMMERCIAL

## 2023-04-04 DIAGNOSIS — M85.80 OSTEOPENIA, UNSPECIFIED LOCATION: ICD-10-CM

## 2023-04-04 DIAGNOSIS — R10.32 LEFT LOWER QUADRANT ABDOMINAL PAIN: ICD-10-CM

## 2023-04-04 PROCEDURE — 74176 CT RENAL STONE STUDY ABD PELVIS WO: ICD-10-PCS | Mod: 26,,, | Performed by: RADIOLOGY

## 2023-04-04 PROCEDURE — 77080 DEXA BONE DENSITY SPINE HIP: ICD-10-PCS | Mod: 26,,, | Performed by: RADIOLOGY

## 2023-04-04 PROCEDURE — 74176 CT ABD & PELVIS W/O CONTRAST: CPT | Mod: 26,,, | Performed by: RADIOLOGY

## 2023-04-04 PROCEDURE — 77080 DXA BONE DENSITY AXIAL: CPT | Mod: 26,,, | Performed by: RADIOLOGY

## 2023-04-04 PROCEDURE — 74176 CT ABD & PELVIS W/O CONTRAST: CPT | Mod: TC,PO

## 2023-04-04 PROCEDURE — 77080 DXA BONE DENSITY AXIAL: CPT | Mod: TC,PO

## 2023-04-06 ENCOUNTER — PATIENT MESSAGE (OUTPATIENT)
Dept: FAMILY MEDICINE | Facility: CLINIC | Age: 63
End: 2023-04-06
Payer: COMMERCIAL

## 2023-04-06 DIAGNOSIS — N28.1 PARAPELVIC RENAL CYST: Primary | ICD-10-CM

## 2023-04-09 ENCOUNTER — PATIENT MESSAGE (OUTPATIENT)
Dept: CARDIOLOGY | Facility: CLINIC | Age: 63
End: 2023-04-09
Payer: COMMERCIAL

## 2023-04-10 ENCOUNTER — PATIENT MESSAGE (OUTPATIENT)
Dept: CARDIOLOGY | Facility: CLINIC | Age: 63
End: 2023-04-10
Payer: COMMERCIAL

## 2023-04-11 ENCOUNTER — OFFICE VISIT (OUTPATIENT)
Dept: UROLOGY | Facility: CLINIC | Age: 63
End: 2023-04-11
Payer: COMMERCIAL

## 2023-04-11 VITALS — WEIGHT: 145.5 LBS | BODY MASS INDEX: 25.78 KG/M2 | HEIGHT: 63 IN

## 2023-04-11 DIAGNOSIS — Z87.442 HISTORY OF KIDNEY STONES: Primary | ICD-10-CM

## 2023-04-11 DIAGNOSIS — N28.1 PARAPELVIC RENAL CYST: ICD-10-CM

## 2023-04-11 PROCEDURE — 99204 OFFICE O/P NEW MOD 45 MIN: CPT | Mod: S$GLB,,,

## 2023-04-11 PROCEDURE — 99204 PR OFFICE/OUTPT VISIT, NEW, LEVL IV, 45-59 MIN: ICD-10-PCS | Mod: S$GLB,,,

## 2023-04-11 PROCEDURE — 99999 PR PBB SHADOW E&M-EST. PATIENT-LVL III: ICD-10-PCS | Mod: PBBFAC,,,

## 2023-04-11 PROCEDURE — 99999 PR PBB SHADOW E&M-EST. PATIENT-LVL III: CPT | Mod: PBBFAC,,,

## 2023-04-11 NOTE — PROGRESS NOTES
[facility] Note  Staff: Christiana Rogers FNP-C    PCP: MD Luis Enrique    Chief Complaint: Left Flank Pain    Subjective:        HPI: Renate Vang is a 62 y.o. female NEW PATIENT presents today for evaluation of left flank pain. She states she had a recent CT which showed parapelvic cysts. Her CT RSS from 4/4/2023 showed There is a 2 mm stable left lower lobe subpleural pulmonary nodule.  This is unchanged relative to 2016.  There is some apparent scarring in the lingula and right middle lobe similar to previous exam.  There is evidence of prior bilateral breast surgery.  The adrenal glands are unremarkable.  There is a 3-4 mm calculus in the left mid kidney.  There is a dextroscoliotic curvature in the thoracolumbar junction region and a levo scoliotic curvature in the lumbar region.  There is a 2 mm calculus in the left lower kidney.  There is a 2 mm calculus in the right mid kidney.  Multiple parapelvic cysts are seen on the left.  There is no evidence of hydronephrosis or ureteral calculus. I reviewed imaging with her today. She has a prior CT from 2020 which showed similar. She states she feels her pain is from the cysts. CT does not suggest obstruction or hydronephrosis. She denies dysuria, hematuria, fever, nausea, vomiting, urgency, frequency, incontinence, and difficulty urinating. She is currently not taking any bladder medications. She does have a history of kidney stones. She states she is using ibuprofen and heating pad for her pain. She denies flank pain today, she states it is intermittent.     Questions asked pt during office visit today:  Urgency:No, incontinence with urgency? No;   DysuriaNo  Gross HematuriaNo  History of UTI: No    History of Kidney Stones?:  Yes    Constipation issues?:  No     REVIEW OF SYSTEMS:  Review of Systems   Constitutional: Negative.  Negative for chills and fever.   HENT: Negative.     Eyes: Negative.    Respiratory: Negative.     Cardiovascular:  Negative.    Gastrointestinal: Negative.  Negative for abdominal pain, constipation, diarrhea, nausea and vomiting.   Genitourinary:  Positive for flank pain. Negative for dysuria, frequency, hematuria and urgency.   Musculoskeletal:  Negative for back pain.   Skin: Negative.    Neurological: Negative.    Endo/Heme/Allergies: Negative.    Psychiatric/Behavioral: Negative.       PMHx:  Past Medical History:   Diagnosis Date    Abnormal Pap smear of cervix     Anxiety resolved    Breast cancer     Depression     History of abnormal cervical Pap smear     History of basal cell carcinoma 2011    right axilla    History of breast cancer 2003    at 42 s/p surg and radiation T1N0M0 ER+    History of HPV infection     LOW RISK    History of parathyroidectomy     R lower gland removed due to adenoma    Kidney stones     Osteopenia     last dexa - refuses additional testing     PONV (postoperative nausea and vomiting)     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)        PSHx:  Past Surgical History:   Procedure Laterality Date    AUGMENTATION OF BREAST      BREAST BIOPSY      BREAST LUMPECTOMY  2003    x2 Right with senital node bx, with radiation    BREAST RECONSTRUCTION      s/p lumpectomy to replace previous implants bilaterally    BREAST SURGERY      breast augmentation prior to lumpectomy    cataract surgery       SECTION, LOW TRANSVERSE      x2    COLONOSCOPY N/A 2018    Procedure: COLONOSCOPY;  Surgeon: Khanh Forrester Jr., MD;  Location: [facility];  Service: Endoscopy;  Laterality: N/A;    COLONOSCOPY W/ POLYPECTOMY  2013    HERBIE.   One 1 mm polyp at the hepatic flexure. TUBULAR ADENOMATOUS POLYP.  One 1 mm polyp in the cecum. TUBULAR ADENOMATOUS POLYP.  The examination was otherwise normal.      HERNIA REPAIR      bilateral groin     LASER ABLATION OF CONDYLOMAS  2012    MALIGNANT SKIN LESION EXCISION      nose & under right arm    PARATHYROID GLAND SURGERY      R lower gland  removed due to adenoma    TONSILLECTOMY         Fam Hx:   malignancies: No , gyn malignancies: Yes - cousin breast cancer   kidney stones: No     Soc Hx:  , lives in [place]    Allergies:  Propranolol, Wellbutrin [bupropion hcl], and Adhesive    Medications: reviewed     Objective:   There were no vitals filed for this visit.    Physical Exam  Constitutional:       Appearance: Normal appearance.   HENT:      Head: Normocephalic.      Mouth/Throat:      Mouth: Mucous membranes are moist.   Eyes:      Conjunctiva/sclera: Conjunctivae normal.   Pulmonary:      Effort: Pulmonary effort is normal.   Abdominal:      General: There is no distension.      Palpations: Abdomen is soft.      Tenderness: There is no abdominal tenderness. There is no right CVA tenderness or left CVA tenderness.   Musculoskeletal:         General: Normal range of motion.      Cervical back: Normal range of motion.   Skin:     General: Skin is warm.   Neurological:      Mental Status: She is alert and oriented to person, place, and time.   Psychiatric:         Mood and Affect: Mood normal.         Behavior: Behavior normal.       LABS REVIEW:  UA today:   Color:Clear, Yellow  Spec. Grav.  1.025  PH  6.5  Negative for leukocytes, nitrates, protein, glucose, ketones, urobili, bili  Trace blood    Assessment:       1. History of kidney stones    2. Parapelvic renal cyst          Plan:      Pt advised to continue conservative measures including ice/heat and antiinflammatories  Will discuss further with Dr. Keyes    F/u As Needed    [facility]: Active    TONJA Person

## 2023-04-13 ENCOUNTER — TELEPHONE (OUTPATIENT)
Dept: UROLOGY | Facility: CLINIC | Age: 63
End: 2023-04-13
Payer: COMMERCIAL

## 2023-04-13 NOTE — TELEPHONE ENCOUNTER
----- Message from Christiana Rogers NP sent at 4/13/2023  9:20 AM CDT -----  Can you let patient know Dr. Keyes reviewed her CT and no changes since 2020 and he states cysts are very small and not likely causing her pain. Thanks!  ----- Message -----  From: EMILY Keyes MD  Sent: 4/12/2023   5:27 PM CDT  To: Christiana Rogers NP    I reviewed her CT scan.  There is no hydronephrosis.  I cannot see any parapelvic cysts.  There may be small cysts but difficult to see on a noncontrast CT scan.  There are a couple of tiny nonobstructing stones which I would not expect to cause pain.    ----- Message -----  From: Christiana Rogers NP  Sent: 4/11/2023   2:18 PM CDT  To: EMILY Keyes MD    Please review her most recent CT. She was referred due to parapelvic renal cysts that have been present since CT in 2020. She is having left flank pain and swears it is from the cysts. I told her they were small and not much changed since 2020. Please let me know if you see any concerns. Thank you!!

## 2023-04-24 ENCOUNTER — PATIENT MESSAGE (OUTPATIENT)
Dept: CARDIOLOGY | Facility: CLINIC | Age: 63
End: 2023-04-24
Payer: COMMERCIAL

## 2023-04-24 ENCOUNTER — TELEPHONE (OUTPATIENT)
Dept: FAMILY MEDICINE | Facility: CLINIC | Age: 63
End: 2023-04-24
Payer: COMMERCIAL

## 2023-04-24 DIAGNOSIS — R00.2 PALPITATIONS: Primary | ICD-10-CM

## 2023-04-24 NOTE — TELEPHONE ENCOUNTER
----- Message from Selina Navarrete NP sent at 4/4/2023  5:29 PM CDT -----  Your bone density shows osteopenia but not enough to start medication. Continue calcium and vitamin d as well as weight bearing exercise. We will repeat the bone density testing in 2 years.     STANLEY Carrasco-BC [facility]  [address]  [place] [address]  [phone] (phone)  [phone] (fax)

## 2023-05-01 ENCOUNTER — PATIENT MESSAGE (OUTPATIENT)
Dept: CARDIOLOGY | Facility: CLINIC | Age: 63
End: 2023-05-01
Payer: COMMERCIAL

## 2023-05-12 ENCOUNTER — PATIENT MESSAGE (OUTPATIENT)
Dept: CARDIOLOGY | Facility: CLINIC | Age: 63
End: 2023-05-12
Payer: COMMERCIAL

## 2023-05-12 DIAGNOSIS — I49.1 PREMATURE ATRIAL CONTRACTIONS: Primary | ICD-10-CM

## 2023-05-24 ENCOUNTER — CLINICAL SUPPORT (OUTPATIENT)
Dept: CARDIOLOGY | Facility: HOSPITAL | Age: 63
End: 2023-05-24
Attending: INTERNAL MEDICINE
Payer: COMMERCIAL

## 2023-05-24 DIAGNOSIS — R00.2 PALPITATIONS: ICD-10-CM

## 2023-05-24 PROCEDURE — 93244 HOLTER MONITOR - 72 HOUR: ICD-10-PCS | Mod: ,,, | Performed by: INTERNAL MEDICINE

## 2023-05-24 PROCEDURE — 93244 EXT ECG>48HR<7D REV&INTERPJ: CPT | Mod: ,,, | Performed by: INTERNAL MEDICINE

## 2023-05-24 PROCEDURE — 93242 EXT ECG>48HR<7D RECORDING: CPT | Mod: PO

## 2023-05-28 NOTE — PROGRESS NOTES
"Subjective:    Patient ID:  Renate Vang is a 62 y.o. female who presents for follow-up of premature atrial contractions      Problem List Items Addressed This Visit          Cardiac/Vascular    Premature atrial contractions - Primary     Other Visit Diagnoses       Encounter for cardiac risk counseling                HPI    Patient was last seen on 11/14/2022 at which time a nuclear stress test was ordered for evaluation.  This was not completed.    On assessment today, the patient states that she is not feeling great. Does not feel like herself, having more palpitations,    Handheld EKGs reviewed which show sinus with frequent PACs       Objective:     Vitals:    05/29/23 1102   BP: 117/66   BP Location: Left arm   Patient Position: Sitting   BP Method: Medium (Automatic)   Pulse: 74   Weight: 66 kg (145 lb 8.1 oz)   Height: 5' 3" (1.6 m)       BP Readings from Last 5 Encounters:   05/29/23 117/66   04/03/23 118/70   01/31/23 118/74   11/29/22 121/76   07/07/22 115/68        Physical Exam  Vitals and nursing note reviewed.   Constitutional:       General: She is not in acute distress.     Appearance: She is well-developed.   HENT:      Head: Normocephalic and atraumatic.   Neck:      Vascular: No JVD.   Cardiovascular:      Rate and Rhythm: Normal rate and regular rhythm.      Heart sounds: Normal heart sounds. No murmur heard.    No friction rub. No gallop.   Pulmonary:      Effort: Pulmonary effort is normal. No respiratory distress.      Breath sounds: Normal breath sounds. No wheezing or rales.   Abdominal:      General: Bowel sounds are normal.      Palpations: Abdomen is soft.      Tenderness: There is no abdominal tenderness. There is no guarding or rebound.   Musculoskeletal:         General: No tenderness.      Cervical back: Neck supple.   Skin:     General: Skin is warm and dry.   Neurological:      Mental Status: She is alert and oriented to person, place, and time.   Psychiatric:         Behavior: " Behavior normal.           Current Outpatient Medications   Medication Instructions    ALPRAZolam (XANAX) 0.25 mg, Oral, 2 times daily PRN    calcium-vitamin D (OSCAL) 250 (625)-125 mg-unit per tablet 1 tablet, Oral, Daily    diltiaZEM (CARDIZEM CD) 120 mg, Oral, Daily    glucosamine-chondroitin 500-400 mg tablet 1 tablet, Oral, 2 times daily    Lactobacillus rhamnosus GG (CULTURELLE) 10 billion cell capsule 1 capsule, Oral, Daily    omega-3 fatty acids/fish oil (FISH OIL-OMEGA-3 FATTY ACIDS) 300-1,000 mg capsule Oral, Daily    RESTASIS 0.05 % ophthalmic emulsion No dose, route, or frequency recorded.    vit A/vit C/vit E/zinc/copper (ICAPS AREDS ORAL) Oral    vitamin D (VITAMIN D3) 600 Units, Oral, Daily    zinc gluconate 25 mg, Oral, Daily       Lipid Panel:   Lab Results   Component Value Date    CHOL 257 (H) 09/23/2022    HDL 75 09/23/2022    LDLCALC 164.6 (H) 09/23/2022    TRIG 87 09/23/2022    CHOLHDL 29.2 09/23/2022       The 10-year ASCVD risk score (Omer DK, et al., 2019) is: 3.8%    Values used to calculate the score:      Age: 62 years      Sex: Female      Is Non- : No      Diabetic: No      Tobacco smoker: No      Systolic Blood Pressure: 117 mmHg      Is BP treated: No      HDL Cholesterol: 75 mg/dL      Total Cholesterol: 257 mg/dL    All pertinent labs, imaging, and EKGs reviewed.  Patient's most recent EKG tracing was personally interpreted by this provider.    Most Recent EKG Results  Results for orders placed or performed in visit on 02/26/22   IN OFFICE EKG 12-LEAD (to [place])    Collection Time: 02/26/22  9:34 AM    Narrative    Test Reason : R00.2,    Vent. Rate : 076 BPM     Atrial Rate : 076 BPM     P-R Int : 122 ms          QRS Dur : 098 ms      QT Int : 394 ms       P-R-T Axes : 071 084 064 degrees     QTc Int : 443 ms    Normal sinus rhythm  Normal ECG  When compared with ECG of 08-JUL-2021 07:03,  No significant change was found  Confirmed by Fernie RIVERA, Sourav ([id number])  on 2/27/2022 7:08:50 AM    Referred By:             Confirmed By:Sourav Enciso MD       Most Recent Echocardiogram Results  Results for orders placed in visit on 07/11/21    Echo    Interpretation Summary  · The left ventricle is normal in size with concentric remodeling and normal systolic function.  · The estimated ejection fraction is 65%.  · Normal left ventricular diastolic function.  · Normal right ventricular size with normal right ventricular systolic function.  · Normal central venous pressure (3 mmHg).      Most Recent Nuclear Stress Test Results  No results found for this or any previous visit.      Most Recent Cardiac PET Stress Test Results  No results found for this or any previous visit.      Most Recent Cardiovascular Angiogram results  No results found for this or any previous visit.      Other Most Recent Cardiology Results  Results for orders placed in visit on 04/06/22    Holter monitor - 48 hour    Interpretation Summary  · Predominant Rhythm Sinus rhythm with heart rates varying between 58 and 114 BPM with an average of 77BPM.  · Ventricular Arrhythmias There were very rare PVCs totalling 6 and averaging 0.13 per hour.  · Supraventricular Arrhythmias There were very frequent PACs totalling 72926 and averaging 250.17 per hour.  · Patient reported numerous symptoms including heart beating fast, awareness of something different, heaviness in the chest, and irregularity. Most of these symptoms corresponded to PACs.    Holter monitor most consistent with symptomatic PACs.        Assessment:       1. Premature atrial contractions    2. Encounter for cardiac risk counseling         Plan:     Symptoms of palpitations  BP/Pulse OK today  Most recent echocardiogram reviewed personally   Handheld EKGs reviewed which show sinus with frequent PACs  Symptoms of sleep apnea are snoring and fatigue  Holter pending at this time    Sleep study/sleep referral   Increase diltiazem to 180mg PO Daily - Educated on  risks/benefits of medication    Continue other cardiac medications  Mediterranean Diet/Cardiovascular Exercise Program    Patient queried and all questions were answered.    F/u in 3 months to reassess      Signed:    Anton Salazar MD  5/29/2023 2:15 PM

## 2023-05-29 ENCOUNTER — OFFICE VISIT (OUTPATIENT)
Dept: CARDIOLOGY | Facility: CLINIC | Age: 63
End: 2023-05-29
Payer: COMMERCIAL

## 2023-05-29 VITALS
HEIGHT: 63 IN | BODY MASS INDEX: 25.78 KG/M2 | SYSTOLIC BLOOD PRESSURE: 117 MMHG | HEART RATE: 74 BPM | DIASTOLIC BLOOD PRESSURE: 66 MMHG | WEIGHT: 145.5 LBS

## 2023-05-29 DIAGNOSIS — I49.1 PREMATURE ATRIAL CONTRACTIONS: Primary | ICD-10-CM

## 2023-05-29 DIAGNOSIS — R00.2 PALPITATIONS: ICD-10-CM

## 2023-05-29 DIAGNOSIS — G47.30 SLEEP APNEA, UNSPECIFIED TYPE: ICD-10-CM

## 2023-05-29 DIAGNOSIS — Z71.89 ENCOUNTER FOR CARDIAC RISK COUNSELING: ICD-10-CM

## 2023-05-29 PROCEDURE — 99999 PR PBB SHADOW E&M-EST. PATIENT-LVL IV: ICD-10-PCS | Mod: PBBFAC,,, | Performed by: INTERNAL MEDICINE

## 2023-05-29 PROCEDURE — 99214 OFFICE O/P EST MOD 30 MIN: CPT | Mod: S$GLB,,, | Performed by: INTERNAL MEDICINE

## 2023-05-29 PROCEDURE — 99999 PR PBB SHADOW E&M-EST. PATIENT-LVL IV: CPT | Mod: PBBFAC,,, | Performed by: INTERNAL MEDICINE

## 2023-05-29 PROCEDURE — 99214 PR OFFICE/OUTPT VISIT, EST, LEVL IV, 30-39 MIN: ICD-10-PCS | Mod: S$GLB,,, | Performed by: INTERNAL MEDICINE

## 2023-05-29 RX ORDER — DILTIAZEM HYDROCHLORIDE 180 MG/1
180 CAPSULE, COATED, EXTENDED RELEASE ORAL DAILY
Qty: 90 CAPSULE | Refills: 3 | Status: SHIPPED | OUTPATIENT
Start: 2023-05-29 | End: 2024-05-28

## 2023-05-30 LAB
OHS CV EVENT MONITOR DAY: 0
OHS CV HOLTER LENGTH DECIMAL HOURS: 24
OHS CV HOLTER LENGTH HOURS: 24
OHS CV HOLTER LENGTH MINUTES: 0

## 2023-06-20 DIAGNOSIS — I20.89 ANGINAL EQUIVALENT: Primary | ICD-10-CM

## 2023-06-20 PROBLEM — R06.02 SOB (SHORTNESS OF BREATH): Status: ACTIVE | Noted: 2023-06-20

## 2023-06-27 ENCOUNTER — CLINICAL SUPPORT (OUTPATIENT)
Dept: CARDIOLOGY | Facility: HOSPITAL | Age: 63
End: 2023-06-27
Attending: INTERNAL MEDICINE
Payer: COMMERCIAL

## 2023-06-27 VITALS — WEIGHT: 146 LBS | HEIGHT: 63 IN | BODY MASS INDEX: 25.87 KG/M2

## 2023-06-27 DIAGNOSIS — I20.89 ANGINAL EQUIVALENT: ICD-10-CM

## 2023-06-27 LAB
CV STRESS BASE HR: 83 BPM
DIASTOLIC BLOOD PRESSURE: 90 MMHG
OHS CV CPX 1 MINUTE RECOVERY HEART RATE: 142 BPM
OHS CV CPX 85 PERCENT MAX PREDICTED HEART RATE MALE: 128
OHS CV CPX ESTIMATED METS: 11
OHS CV CPX MAX PREDICTED HEART RATE: 151
OHS CV CPX PATIENT IS FEMALE: 1
OHS CV CPX PATIENT IS MALE: 0
OHS CV CPX PEAK DIASTOLIC BLOOD PRESSURE: 71 MMHG
OHS CV CPX PEAK HEAR RATE: 169 BPM
OHS CV CPX PEAK RATE PRESSURE PRODUCT: NORMAL
OHS CV CPX PEAK SYSTOLIC BLOOD PRESSURE: 197 MMHG
OHS CV CPX PERCENT MAX PREDICTED HEART RATE ACHIEVED: 112
OHS CV CPX RATE PRESSURE PRODUCT PRESENTING: 9628
STRESS ECHO POST EXERCISE DUR MIN: 6 MINUTES
STRESS ECHO POST EXERCISE DUR SEC: 8 SECONDS
SYSTOLIC BLOOD PRESSURE: 116 MMHG

## 2023-06-27 PROCEDURE — 93018 EXERCISE STRESS - EKG (CUPID ONLY): ICD-10-PCS | Mod: ,,, | Performed by: INTERNAL MEDICINE

## 2023-06-27 PROCEDURE — 93016 CV STRESS TEST SUPVJ ONLY: CPT | Mod: ,,, | Performed by: INTERNAL MEDICINE

## 2023-06-27 PROCEDURE — 93017 CV STRESS TEST TRACING ONLY: CPT | Mod: PO

## 2023-06-27 PROCEDURE — 93016 EXERCISE STRESS - EKG (CUPID ONLY): ICD-10-PCS | Mod: ,,, | Performed by: INTERNAL MEDICINE

## 2023-06-27 PROCEDURE — 93018 CV STRESS TEST I&R ONLY: CPT | Mod: ,,, | Performed by: INTERNAL MEDICINE

## 2023-07-23 ENCOUNTER — OFFICE VISIT (OUTPATIENT)
Dept: URGENT CARE | Facility: CLINIC | Age: 63
End: 2023-07-23
Payer: COMMERCIAL

## 2023-07-23 VITALS
HEIGHT: 63 IN | SYSTOLIC BLOOD PRESSURE: 125 MMHG | TEMPERATURE: 101 F | OXYGEN SATURATION: 98 % | DIASTOLIC BLOOD PRESSURE: 79 MMHG | RESPIRATION RATE: 18 BRPM | BODY MASS INDEX: 25.87 KG/M2 | WEIGHT: 146 LBS | HEART RATE: 96 BPM

## 2023-07-23 DIAGNOSIS — U07.1 COVID-19: Primary | ICD-10-CM

## 2023-07-23 DIAGNOSIS — R05.9 COUGH, UNSPECIFIED TYPE: ICD-10-CM

## 2023-07-23 LAB
CTP QC/QA: YES
SARS-COV-2 AG RESP QL IA.RAPID: POSITIVE

## 2023-07-23 PROCEDURE — 99214 OFFICE O/P EST MOD 30 MIN: CPT | Mod: S$GLB,,, | Performed by: PHYSICIAN ASSISTANT

## 2023-07-23 PROCEDURE — 87811 SARS-COV-2 COVID19 W/OPTIC: CPT | Mod: QW,S$GLB,, | Performed by: PHYSICIAN ASSISTANT

## 2023-07-23 PROCEDURE — 99214 PR OFFICE/OUTPT VISIT, EST, LEVL IV, 30-39 MIN: ICD-10-PCS | Mod: S$GLB,,, | Performed by: PHYSICIAN ASSISTANT

## 2023-07-23 PROCEDURE — 87811 SARS CORONAVIRUS 2 ANTIGEN POCT, MANUAL READ: ICD-10-PCS | Mod: QW,S$GLB,, | Performed by: PHYSICIAN ASSISTANT

## 2023-07-23 RX ORDER — AZELASTINE 1 MG/ML
1 SPRAY, METERED NASAL 2 TIMES DAILY
Qty: 30 ML | Refills: 0 | Status: SHIPPED | OUTPATIENT
Start: 2023-07-23 | End: 2023-10-12

## 2023-07-23 RX ORDER — ONDANSETRON 4 MG/1
4 TABLET, FILM COATED ORAL EVERY 8 HOURS PRN
Qty: 30 TABLET | Refills: 0 | Status: SHIPPED | OUTPATIENT
Start: 2023-07-23 | End: 2023-10-12

## 2023-07-23 RX ORDER — BENZONATATE 200 MG/1
200 CAPSULE ORAL 3 TIMES DAILY PRN
Qty: 30 CAPSULE | Refills: 0 | Status: SHIPPED | OUTPATIENT
Start: 2023-07-23 | End: 2023-08-02

## 2023-07-23 NOTE — PATIENT INSTRUCTIONS
Your test was POSITIVE for COVID-19 (coronavirus).       Please isolate yourself at home.  You may leave home and/or return to work once the following conditions are met:    If you were not hospitalized and are not moderately to severely immunocompromised:   More than 5 days since symptoms first appeared AND  More than 24 hours fever free without medications AND  Symptoms are improving  Continue to wear a mask around others for 5 additional days.    If you were hospitalized OR are moderately to severely immunocompromised:  More than 20 days since symptoms first appeared  More than 24 hours fever free without medications  Symptoms have improved    If you had no symptoms but tested positive:  More than 5 days since the date of the first positive test (20 days if moderately to severely immunocompromised). If you develop symptoms, then use the guidelines above.  Continue to wear a mask around others for 5 additional days.      Contact Tracing    As one of the next steps, you will receive a call or text from the [place] Department of Health ([facility]) COVID-19 Tracing Team. See the contact information below so you know not to ignore the health departments call. It is important that you contact them back immediately so they can help.      Contact Tracer Number:  [phone]  Caller ID for most carriers: [facility]     What is contact tracing?  Contact tracing is a process that helps identify everyone who has been in close contact with an infected person. Contact tracers let those people know they may have been exposed and guide them on next steps. Confidentiality is important for everyone; no one will be told who may have exposed them to the virus.  Your involvement is important. The more we know about where and how this virus is spreading, the better chance we have at stopping it from spreading further.  What does exposure mean?  Exposure means you have been within 6 feet for more than 15 minutes with a person who  has or had COVID-19.  What kind of questions do the contact tracers ask?  A contact tracer will confirm your basic contact information including name, address, phone number, and next of kin, as well as asking about any symptoms you may have had. Theyll also ask you how you think you may have gotten sick, such as places where you may have been exposed to the virus, and people you were with. Those names will never be shared with anyone outside of that call, and will only be used to help trace and stop the spread of the virus.   I have privacy concerns. How will the state use my information?  Your privacy about your health is important. All calls are completed using call centers that use the appropriate health privacy protection measures (HIPAA compliance), meaning that your patient information is safe. No one will ever ask you any questions related to immigration status. Your health comes first.   Do I have to participate?  You do not have to participate, but we strongly encourage you to. Contact tracing can help us catch and control new outbreaks as theyre developing to keep your friends and family safe.   What if I dont hear from anyone?  If you dont receive a call within 24 hours, you can call the number above right away to inquire about your status. That line is open from 8:00 am - 8:00 p.m., 7 days a week.  Contact tracing saves lives! Together, we have the power to beat this virus and keep our loved ones and neighbors safe.    For more information see CDC link below.      https://www.cdc.gov/coronavirus/2019-ncov/hcp/guidance-prevent-spread.html#precautions        Sources:  [facility], [place] [facility] and [facility]           Sincerely,     JUILOCESAR Sosa

## 2023-07-23 NOTE — LETTER
[address] [address], [address] ? [place] [address] ? Phone [phone] ? Fax [phone]           Return to Work/School    Patient: Renate Vang  YOB: 1960   Date: 07/23/2023      To Whom It May Concern:     Renate Vang was in contact with/seen in my office on 07/23/2023. COVID-19 is present in our communities across the [facility]. Not all patients are eligible or appropriate to be tested. In this situation, your employee meets the following criteria:     Renate Vang has met the criteria for COVID-19 testing and has a POSITIVE result. She can return to work once they are asymptomatic for 24 hours without the use of fever reducing medications AND at least five days from the start of symptoms (or from the first positive result if they have no symptoms).      If you have any questions or concerns, or if I can be of further assistance, please do not hesitate to contact me.     Sincerely,    JULIOCESAR Sosa

## 2023-07-23 NOTE — PROGRESS NOTES
"Subjective:      Patient ID: Renate Vang is a 62 y.o. female.    Vitals:  height is 5' 3" (1.6 m) and weight is 66.2 kg (146 lb). Her oral temperature is 101 °F (38.3 °C) (abnormal). Her blood pressure is 125/79 and her pulse is 96. Her respiration is 18 and oxygen saturation is 98%.     Chief Complaint: COVID-19 Concerns    Pt present today c/o covid +. Pt wants meds for symptoms.  Pt says she have really bad body aches and she also have cancer, tested + this morning. Pt symptoms started two days ago. Took otc meds.     Other  This is a new problem. The current episode started yesterday. The problem occurs constantly. The problem has been unchanged. Associated symptoms include congestion, coughing, fatigue, nausea, a sore throat and weakness. Nothing aggravates the symptoms. She has tried NSAIDs for the symptoms. The treatment provided no relief.   Constitution: Positive for fatigue.   HENT:  Positive for congestion and sore throat.    Respiratory:  Positive for cough.    Gastrointestinal:  Positive for nausea.    Objective:     Physical Exam   Constitutional: She does not appear ill. No distress.   HENT:   Head: Normocephalic and atraumatic.   Ears:   Right Ear: External ear normal.   Left Ear: External ear normal.   Eyes: Conjunctivae are normal. Right eye exhibits no discharge. Left eye exhibits no discharge. Extraocular movement intact   Cardiovascular: Normal rate, regular rhythm and normal heart sounds.   No murmur heard.  Pulmonary/Chest: Effort normal and breath sounds normal. She has no wheezes. She has no rhonchi. She has no rales.   Abdominal: Normal appearance.   Musculoskeletal: Normal range of motion.         General: Normal range of motion.   Neurological: no focal deficit. She is alert.   Skin: Skin is warm, dry and not pale. jaundice  Psychiatric: Her behavior is normal. Mood, judgment and thought content normal.   Nursing note and vitals reviewed.    Assessment:     1. COVID-19    2. Cough, " unspecified type        Plan:       COVID-19    Cough, unspecified type  -     SARS Coronavirus 2 Antigen, POCT Manual Read    Results for orders placed or performed in visit on 07/23/23   SARS Coronavirus 2 Antigen, POCT Manual Read   Result Value Ref Range    SARS Coronavirus 2 Antigen Positive (A) Negative     Acceptable Yes         Other orders  -     benzonatate (TESSALON) 200 MG capsule; Take 1 capsule (200 mg total) by mouth 3 (three) times daily as needed for Cough.  Dispense: 30 capsule; Refill: 0  -     ondansetron (ZOFRAN) 4 MG tablet; Take 1 tablet (4 mg total) by mouth every 8 (eight) hours as needed for Nausea.  Dispense: 30 tablet; Refill: 0  -     azelastine (ASTELIN) 137 mcg (0.1 %) nasal spray; 1 spray (137 mcg total) by Nasal route 2 (two) times daily.  Dispense: 30 mL; Refill: 0    Patient opting to take antipyretic at home.  We did discuss Paxlovid.  Discussed interactions with her medications.  Discussed potential side effects.  Discussed potential rebound.  After discussion patient opting not to proceed with Paxlovid.  Discussed treat symptomatically.    Patient Instructions   Your test was POSITIVE for COVID-19 (coronavirus).       Please isolate yourself at home.  You may leave home and/or return to work once the following conditions are met:    If you were not hospitalized and are not moderately to severely immunocompromised:   More than 5 days since symptoms first appeared AND  More than 24 hours fever free without medications AND  Symptoms are improving  Continue to wear a mask around others for 5 additional days.    If you were hospitalized OR are moderately to severely immunocompromised:  More than 20 days since symptoms first appeared  More than 24 hours fever free without medications  Symptoms have improved    If you had no symptoms but tested positive:  More than 5 days since the date of the first positive test (20 days if moderately to severely immunocompromised). If  you develop symptoms, then use the guidelines above.  Continue to wear a mask around others for 5 additional days.      Contact Tracing    As one of the next steps, you will receive a call or text from the [place] [facility] ([facility]) COVID-19 Tracing Team. See the contact information below so you know not to ignore the health departments call. It is important that you contact them back immediately so they can help.      Contact Tracer Number:  [phone]  Caller ID for most carriers: [facility]     What is contact tracing?  Contact tracing is a process that helps identify everyone who has been in close contact with an infected person. Contact tracers let those people know they may have been exposed and guide them on next steps. Confidentiality is important for everyone; no one will be told who may have exposed them to the virus.  Your involvement is important. The more we know about where and how this virus is spreading, the better chance we have at stopping it from spreading further.  What does exposure mean?  Exposure means you have been within 6 feet for more than 15 minutes with a person who has or had COVID-19.  What kind of questions do the contact tracers ask?  A contact tracer will confirm your basic contact information including name, address, phone number, and next of kin, as well as asking about any symptoms you may have had. Theyll also ask you how you think you may have gotten sick, such as places where you may have been exposed to the virus, and people you were with. Those names will never be shared with anyone outside of that call, and will only be used to help trace and stop the spread of the virus.   I have privacy concerns. How will the state use my information?  Your privacy about your health is important. All calls are completed using call centers that use the appropriate health privacy protection measures (HIPAA compliance), meaning that your patient information is safe. No  one will ever ask you any questions related to immigration status. Your health comes first.   Do I have to participate?  You do not have to participate, but we strongly encourage you to. Contact tracing can help us catch and control new outbreaks as theyre developing to keep your friends and family safe.   What if I dont hear from anyone?  If you dont receive a call within 24 hours, you can call the number above right away to inquire about your status. That line is open from 8:00 am - 8:00 p.m., 7 days a week.  Contact tracing saves lives! Together, we have the power to beat this virus and keep our loved ones and neighbors safe.    For more information see CDC link below.      https://www.cdc.gov/coronavirus/2019-ncov/hcp/guidance-prevent-spread.html#precautions        Sources:  [facility], [place] [facility]           Sincerely,     JULIOCESAR Sosa

## 2023-08-29 NOTE — PROGRESS NOTES
"Subjective:    Patient ID:  Renate Vang is a 62 y.o. female who presents for follow-up of PREMATURE ATRIAL CONTRACTIONS      Problem List Items Addressed This Visit          Pulmonary    SOB (shortness of breath)       Cardiac/Vascular    Premature atrial contractions - Primary     Other Visit Diagnoses       Encounter for cardiac risk counseling                HPI    Patient was last seen on 05/29/2023 at which time she was having issues with palpitations.  Sleep testing was recommended.  Diltiazem was increased to 180 mg PO Daily.    On assessment today, the patient states that she is feeling better from a palpitations standpoint, but still fatigued post COVID.    No chest pain.  No shortness of breath.         Objective:     Vitals:    08/30/23 1033   BP: 115/71   BP Location: Left arm   Patient Position: Sitting   BP Method: Medium (Automatic)   Pulse: 67   Weight: 67.4 kg (148 lb 9.4 oz)   Height: 5' 3" (1.6 m)       BP Readings from Last 5 Encounters:   08/30/23 115/71   07/23/23 125/79   06/20/23 124/88   05/29/23 117/66   04/03/23 118/70        Physical Exam  Vitals and nursing note reviewed.   Constitutional:       General: She is not in acute distress.     Appearance: She is well-developed.   HENT:      Head: Normocephalic and atraumatic.   Neck:      Vascular: No JVD.   Cardiovascular:      Rate and Rhythm: Normal rate and regular rhythm.      Heart sounds: Normal heart sounds. No murmur heard.     No friction rub. No gallop.   Pulmonary:      Effort: Pulmonary effort is normal. No respiratory distress.      Breath sounds: Normal breath sounds. No wheezing or rales.   Abdominal:      General: Bowel sounds are normal.      Palpations: Abdomen is soft.      Tenderness: There is no abdominal tenderness. There is no guarding or rebound.   Musculoskeletal:         General: No tenderness.      Cervical back: Neck supple.   Skin:     General: Skin is warm and dry.   Neurological:      Mental Status: She is " alert and oriented to person, place, and time.   Psychiatric:         Behavior: Behavior normal.             Current Outpatient Medications   Medication Instructions    ALPRAZolam (XANAX) 0.25 mg, Oral, 2 times daily PRN    azelastine (ASTELIN) 137 mcg, Nasal, 2 times daily    b complex vitamins tablet 1 tablet, Oral, Daily    calcium-vitamin D (OSCAL) 250 (625)-125 mg-unit per tablet 1 tablet, Oral, Daily    diltiaZEM (CARDIZEM CD) 180 mg, Oral, Daily    glucosamine-chondroitin 500-400 mg tablet 1 tablet, Oral, 2 times daily    Lactobacillus rhamnosus GG (CULTURELLE) 10 billion cell capsule 1 capsule, Oral, Daily    magnesium oxide (MAG-OX) 400 mg, Oral, Daily    omega-3 fatty acids/fish oil (FISH OIL-OMEGA-3 FATTY ACIDS) 300-1,000 mg capsule Oral, Daily    ondansetron (ZOFRAN) 4 mg, Oral, Every 8 hours PRN    RESTASIS 0.05 % ophthalmic emulsion No dose, route, or frequency recorded.    vit A/vit C/vit E/zinc/copper (ICAPS AREDS ORAL) Oral    vitamin D (VITAMIN D3) 600 Units, Oral, Daily    vitamin E 1,000 Units, Oral, Daily, 5000 UNITS    zinc gluconate 25 mg, Oral, Daily       Lipid Panel:   Lab Results   Component Value Date    CHOL 257 (H) 09/23/2022    HDL 75 09/23/2022    LDLCALC 164.6 (H) 09/23/2022    TRIG 87 09/23/2022    CHOLHDL 29.2 09/23/2022       The 10-year ASCVD risk score (Omer DK, et al., 2019) is: 3.7%    Values used to calculate the score:      Age: 62 years      Sex: Female      Is Non- : No      Diabetic: No      Tobacco smoker: No      Systolic Blood Pressure: 115 mmHg      Is BP treated: No      HDL Cholesterol: 75 mg/dL      Total Cholesterol: 257 mg/dL    All pertinent labs, imaging, and EKGs reviewed.  Patient's most recent EKG tracing was personally interpreted by this provider.    Most Recent EKG Results  Results for orders placed or performed in visit on 02/26/22   IN OFFICE EKG 12-LEAD (to [place])    Collection Time: 02/26/22  9:34 AM    Narrative    Test  Reason : R00.2,    Vent. Rate : 076 BPM     Atrial Rate : 076 BPM     P-R Int : 122 ms          QRS Dur : 098 ms      QT Int : 394 ms       P-R-T Axes : 071 084 064 degrees     QTc Int : 443 ms    Normal sinus rhythm  Normal ECG  When compared with ECG of 08-JUL-2021 07:03,  No significant change was found  Confirmed by Sourav Enciso MD ([id number]) on 2/27/2022 7:08:50 AM    Referred By:             Confirmed By:Sourav Enciso MD       Most Recent Echocardiogram Results  Results for orders placed in visit on 07/11/21    Echo    Interpretation Summary  · The left ventricle is normal in size with concentric remodeling and normal systolic function.  · The estimated ejection fraction is 65%.  · Normal left ventricular diastolic function.  · Normal right ventricular size with normal right ventricular systolic function.  · Normal central venous pressure (3 mmHg).      Most Recent Nuclear Stress Test Results  Results for orders placed in visit on 06/27/23    Exercise Stress - EKG    Interpretation Summary    The ECG portion of the study is negative for ischemia.    The patient reported no chest pain during the stress test.    The blood pressure response to stress was normal.    During stress, occasional PVCs are noted.    The exercise capacity was average.    The patient exercised for 6 minutes 8 seconds on a high ramp protocol, corresponding to a functional capacity of 11.0 METS, achieving a peak heart rate of 169 bpm, which is 112 % of the age predicted maximum heart rate. Their exercise capacity was average.      Most Recent Cardiac PET Stress Test Results  No results found for this or any previous visit.      Most Recent Cardiovascular Angiogram results  No results found for this or any previous visit.      Other Most Recent Cardiology Results  Results for orders placed in visit on 05/24/23    Holter monitor - 72 hour    Interpretation Summary  · Patient monitored for 1d  · Primary rhythm was Sinus Rhythm. Average heart rate was  75 bpm, Minimum heart rate was 58 bpm on Day 2 /05:01:17 am, Max heart rate was 104 bpm on Day 1 / 08:50:10 am  · Atrial Fibrillation or Flutter: [place] was 0 %  · SVE(s): [place] was 22.75 %  · PVC(s): [place] was 0.16 %,  · Pause: 0 events  · Patient reported episode of fluttering which corresponded to sinus rhythm with PACs  · Patient reported episode of fluttering/chest discomfort which corresponded to sinus rhythm with PACs  · Patient reported episode of fluttering, heaviness which corresponded to sinus rhythm with PACs.  · Very frequent and occasionally symptomatic PACs.        Assessment:       1. Premature atrial contractions    2. SOB (shortness of breath)    3. Encounter for cardiac risk counseling         Plan:     Symptoms OK today from a cardiac standpoint, handling stress better  BP/Pulse OK today  Most recent echocardiogram reviewed personally     Continue diltiazem  mg PO Daily    Continue other cardiac medications  Mediterranean Diet/Cardiovascular Exercise Program    Patient queried and all questions were answered.    F/u in 9-12 months to reassess      Signed:    Anton Salazar MD  8/30/2023 3:30 PM

## 2023-08-30 ENCOUNTER — OFFICE VISIT (OUTPATIENT)
Dept: CARDIOLOGY | Facility: CLINIC | Age: 63
End: 2023-08-30
Payer: COMMERCIAL

## 2023-08-30 VITALS
BODY MASS INDEX: 26.32 KG/M2 | HEIGHT: 63 IN | DIASTOLIC BLOOD PRESSURE: 71 MMHG | HEART RATE: 67 BPM | SYSTOLIC BLOOD PRESSURE: 115 MMHG | WEIGHT: 148.56 LBS

## 2023-08-30 DIAGNOSIS — I49.1 PREMATURE ATRIAL CONTRACTIONS: Primary | ICD-10-CM

## 2023-08-30 DIAGNOSIS — Z71.89 ENCOUNTER FOR CARDIAC RISK COUNSELING: ICD-10-CM

## 2023-08-30 DIAGNOSIS — R06.02 SOB (SHORTNESS OF BREATH): ICD-10-CM

## 2023-08-30 PROCEDURE — 99214 OFFICE O/P EST MOD 30 MIN: CPT | Mod: S$GLB,,, | Performed by: INTERNAL MEDICINE

## 2023-08-30 PROCEDURE — 99214 PR OFFICE/OUTPT VISIT, EST, LEVL IV, 30-39 MIN: ICD-10-PCS | Mod: S$GLB,,, | Performed by: INTERNAL MEDICINE

## 2023-08-30 PROCEDURE — 99999 PR PBB SHADOW E&M-EST. PATIENT-LVL IV: CPT | Mod: PBBFAC,,, | Performed by: INTERNAL MEDICINE

## 2023-08-30 PROCEDURE — 99999 PR PBB SHADOW E&M-EST. PATIENT-LVL IV: ICD-10-PCS | Mod: PBBFAC,,, | Performed by: INTERNAL MEDICINE

## 2023-08-30 RX ORDER — PNV NO.95/FERROUS FUM/FOLIC AC 28MG-0.8MG
1000 TABLET ORAL DAILY
COMMUNITY

## 2023-09-15 NOTE — PROGRESS NOTES
Subjective:    Patient ID:  Renate Vang is a 62 y.o. female who presents for follow-up of No chief complaint on file.      TELEMEDICINE VISIT      Problem List Items Addressed This Visit          Cardiac/Vascular    Premature atrial contractions - Primary       HPI    Patient was last seen on 09/23/2022 at which time she was concerned that she was having mental clarity issues that were coming from propranolol.  Propranolol was stopped with plans to start diltiazem  mg PO Daily if palpitations worsen again versus PRN propranolol dosing.    Patient presents for telemedicine visit.    On assessment today, the patient states that since last time we spoke she hasn't taken any medication and started magnesium supplementation. Doing better overall. Sensation has improved. Limiting ETOH triggers. Mental clarity improved off of the propranolol      Some chest pain in the mornings.  How is mental clarity - Improved   How are palpitations - Still with some sensation  No obvious sleep apnea symptoms    Home BP - Good       Objective:   There were no vitals filed for this visit.     Physical Exam  Vitals and nursing note reviewed.   Constitutional:       General: She is not in acute distress.     Appearance: She is well-developed.   HENT:      Head: Normocephalic and atraumatic.   Neck:      Vascular: No JVD.   Cardiovascular:      Rate and Rhythm: Normal rate and regular rhythm.      Heart sounds: Normal heart sounds. No murmur heard.    No friction rub. No gallop.   Pulmonary:      Effort: Pulmonary effort is normal. No respiratory distress.      Breath sounds: Normal breath sounds. No wheezing or rales.   Abdominal:      General: Bowel sounds are normal.      Palpations: Abdomen is soft.      Tenderness: There is no abdominal tenderness. There is no guarding or rebound.   Musculoskeletal:         General: No tenderness.      Cervical back: Neck supple.   Skin:     General: Skin is warm and dry.   Neurological:       Mental Status: She is alert and oriented to person, place, and time.   Psychiatric:         Behavior: Behavior normal.           Current Outpatient Medications on File Prior to Visit   Medication Sig    ALPRAZolam (XANAX) 0.25 MG tablet Take 1 tablet (0.25 mg total) by mouth 2 (two) times daily as needed for Anxiety.    calcium-vitamin D (OSCAL) 250 (625)-125 mg-unit per tablet Take 1 tablet by mouth once daily.    cetirizine (ZYRTEC) 10 MG tablet Take 10 mg by mouth once daily.    glucosamine-chondroitin 500-400 mg tablet Take 1 tablet by mouth 2 (two) times daily.    Lactobacillus rhamnosus GG (CULTURELLE) 10 billion cell capsule Take 1 capsule by mouth once daily.    RESTASIS 0.05 % ophthalmic emulsion     vit A/vit C/vit E/zinc/copper (ICAPS AREDS ORAL) Take by mouth.    vitamin D (VITAMIN D3) 1000 units Tab Take 600 Units by mouth once daily.     zinc gluconate 50 mg tablet Take 25 mg by mouth once daily.      No current facility-administered medications on file prior to visit.       Lipid Panel:   Lab Results   Component Value Date    CHOL 257 (H) 09/23/2022    HDL 75 09/23/2022    LDLCALC 164.6 (H) 09/23/2022    TRIG 87 09/23/2022    CHOLHDL 29.2 09/23/2022       The 10-year ASCVD risk score (Omer LONDON, et al., 2019) is: 3.2%    Values used to calculate the score:      Age: 62 years      Sex: Female      Is Non- : No      Diabetic: No      Tobacco smoker: No      Systolic Blood Pressure: 108 mmHg      Is BP treated: No      HDL Cholesterol: 75 mg/dL      Total Cholesterol: 257 mg/dL    All pertinent labs, imaging, and EKGs reviewed.  Patient's most recent EKG tracing was personally interpreted by this provider.    Most Recent Echocardiogram Results  Results for orders placed in visit on 07/11/21    Echo    Interpretation Summary  · The left ventricle is normal in size with concentric remodeling and normal systolic function.  · The estimated ejection fraction is 65%.  · Normal left  ventricular diastolic function.  · Normal right ventricular size with normal right ventricular systolic function.  · Normal central venous pressure (3 mmHg).        Most Recent EKG  Results for orders placed or performed in visit on 02/26/22   IN OFFICE EKG 12-LEAD (to [place])    Collection Time: 02/26/22  9:34 AM    Narrative    Test Reason : R00.2,    Vent. Rate : 076 BPM     Atrial Rate : 076 BPM     P-R Int : 122 ms          QRS Dur : 098 ms      QT Int : 394 ms       P-R-T Axes : 071 084 064 degrees     QTc Int : 443 ms    Normal sinus rhythm  Normal ECG  When compared with ECG of 08-JUL-2021 07:03,  No significant change was found  Confirmed by Sourav Enciso MD ([id number]) on 2/27/2022 7:08:50 AM    Referred By:             Confirmed By:Sourav Enciso MD       No valid procedures specified.   No results found for this or any previous visit.    No valid procedures specified.    Assessment:       1. Premature atrial contractions         Plan:     Symptoms of atypical chest pain in the AM  BP/Pulse unable to be assessed on telemedicine visit  Most recent echocardiogram reviewed personally      Nuclear stress test to rule out ischemia, cannot exercise sufficiently to get HR up with exercise testing  If palpitations worsen and willing to consider alternative medication, then could consider diltiazem  mg PO Daily in the future    Continue other cardiac medications  Mediterranean Diet/Cardiovascular Exercise Program    Patient queried and all questions were answered.    F/u in 6 months to reassess    The patient location is: Home   The chief complaint leading to consultation is:  Please see problem list above  Visit type: Virtual visit with synchronous audio and video  Total time spent with patient: 15 minutes   Each patient to whom he or she provides medical services by telemedicine is:  (1) informed of the relationship between the physician and patient and the respective role of any other health care provider with respect  GYN to management of the patient; and (2) notified that he or she may decline to receive medical services by telemedicine and may withdraw from such care at any time.    Notes: See above       Signed:    Anton Salazar MD  11/4/2022 11:15 AM

## 2023-10-12 ENCOUNTER — OFFICE VISIT (OUTPATIENT)
Dept: FAMILY MEDICINE | Facility: CLINIC | Age: 63
End: 2023-10-12
Payer: COMMERCIAL

## 2023-10-12 VITALS
WEIGHT: 146.63 LBS | SYSTOLIC BLOOD PRESSURE: 118 MMHG | RESPIRATION RATE: 20 BRPM | DIASTOLIC BLOOD PRESSURE: 72 MMHG | BODY MASS INDEX: 25.98 KG/M2 | HEIGHT: 63 IN | TEMPERATURE: 98 F | OXYGEN SATURATION: 98 % | HEART RATE: 74 BPM

## 2023-10-12 DIAGNOSIS — F41.9 ANXIETY: ICD-10-CM

## 2023-10-12 DIAGNOSIS — R10.9 FLANK PAIN: ICD-10-CM

## 2023-10-12 DIAGNOSIS — Z00.00 ANNUAL PHYSICAL EXAM: Primary | ICD-10-CM

## 2023-10-12 PROCEDURE — 99396 PREV VISIT EST AGE 40-64: CPT | Mod: S$GLB,,, | Performed by: FAMILY MEDICINE

## 2023-10-12 PROCEDURE — 99396 PR PREVENTIVE VISIT,EST,40-64: ICD-10-PCS | Mod: S$GLB,,, | Performed by: FAMILY MEDICINE

## 2023-10-12 RX ORDER — ALPRAZOLAM 0.25 MG/1
0.25 TABLET ORAL DAILY PRN
Qty: 30 TABLET | Refills: 0 | Status: SHIPPED | OUTPATIENT
Start: 2023-10-12 | End: 2024-03-04

## 2023-10-12 RX ORDER — TRAMADOL HYDROCHLORIDE 50 MG/1
50 TABLET ORAL 2 TIMES DAILY PRN
Qty: 30 EACH | Refills: 0 | Status: SHIPPED | OUTPATIENT
Start: 2023-10-12

## 2023-10-12 RX ORDER — ONDANSETRON 8 MG/1
8 TABLET, ORALLY DISINTEGRATING ORAL EVERY 8 HOURS PRN
Qty: 20 TABLET | Refills: 3 | Status: SHIPPED | OUTPATIENT
Start: 2023-10-12 | End: 2024-03-04

## 2023-10-12 RX ORDER — ESTRADIOL 0.1 MG/G
CREAM VAGINAL
COMMUNITY
Start: 2023-07-26

## 2023-10-12 NOTE — PROGRESS NOTES
Subjective:       Patient ID: Renate Vnag is a 63 y.o. female.    Chief Complaint: Annual Exam    HPI  The patient is a 63-year-old who is here today for her annual exam.  She does need forms completed for work.  She would be willing to have fasting labs soon.  She does stay physically active and tries to consume a healthy diet.  Her Pap smear and med her mammogram are up-to-date.  She is due for her colonoscopy later this year.    While she is here today, she would like to have a refill of her Xanax.  She uses half of a Xanax 0.25 mg tablet very sparingly for times when she is really anxious and used slow her brain down.  She last received a prescription for 40 Xanax in February.    While she is here, she would also like to have refill of her Ultram and a nausea medication.  She has had recurrent episodes of left flank pain.  She believes that her left flank pain is caused by kidney stones although she has only been found to have nonobstructing stones.  She believes that her left flank pain occurs when she is dehydrated which will cause problems with her stones.  She did see a nurse practitioner with urology who did not think this was the cause of her symptoms but the patient is convinced that it is.  When she has a flare in this left flank pain, she will usually take 2 Ultram doses and eventually get complete relief of her pain.  She does request a nausea medicine because she always gets nauseated with pain medicine.  We did discuss the scoliosis noted on her imaging and that her flank pain may be musculoskeletal but she is convinced that it is really her kidney stones causing this pain.    Review of Systems   Constitutional:  Negative for appetite change, chills, diaphoresis, fatigue, fever and unexpected weight change.   HENT:  Negative for congestion, dental problem, ear pain, hearing loss, postnasal drip, rhinorrhea, sneezing, sore throat and trouble swallowing.    Eyes:  Negative for photophobia, pain,  discharge and visual disturbance.   Respiratory:  Negative for cough, chest tightness, shortness of breath and wheezing.    Cardiovascular:  Negative for chest pain, palpitations and leg swelling.   Gastrointestinal:  Negative for abdominal distention, abdominal pain, blood in stool, constipation, diarrhea, nausea and vomiting.   Endocrine: Negative for cold intolerance, heat intolerance, polydipsia and polyuria.   Genitourinary:  Negative for dysuria, flank pain, frequency, genital sores, hematuria, menstrual problem and vaginal discharge.   Musculoskeletal:  Negative for arthralgias, joint swelling and myalgias.   Skin:  Negative for rash.   Neurological:  Negative for dizziness, syncope, light-headedness and headaches.   Hematological:  Negative for adenopathy. Does not bruise/bleed easily.   Psychiatric/Behavioral:  Negative for dysphoric mood, self-injury, sleep disturbance and suicidal ideas. The patient is not nervous/anxious.          Objective:      Physical Exam  Constitutional:       General: She is not in acute distress.     Appearance: Normal appearance. She is well-developed.   HENT:      Head: Normocephalic and atraumatic.      Right Ear: Hearing, tympanic membrane, ear canal and external ear normal.      Left Ear: Hearing, tympanic membrane, ear canal and external ear normal.      Nose: Nose normal.      Mouth/Throat:      Mouth: No oral lesions.      Pharynx: No oropharyngeal exudate or posterior oropharyngeal erythema.   Eyes:      General: Lids are normal. No scleral icterus.     Extraocular Movements: Extraocular movements intact.      Conjunctiva/sclera: Conjunctivae normal.      Pupils: Pupils are equal, round, and reactive to light.   Neck:      Thyroid: No thyroid mass or thyromegaly.      Vascular: No carotid bruit.   Cardiovascular:      Rate and Rhythm: Normal rate and regular rhythm. No extrasystoles are present.     Chest Wall: PMI is not displaced.      Heart sounds: Normal heart  "sounds. No murmur heard.     No gallop.   Pulmonary:      Effort: Pulmonary effort is normal. No accessory muscle usage or respiratory distress.      Breath sounds: Normal breath sounds.   Abdominal:      General: Bowel sounds are normal. There is no abdominal bruit.      Palpations: Abdomen is soft.      Tenderness: There is no abdominal tenderness. There is no rebound.   Musculoskeletal:      Cervical back: Normal range of motion and neck supple.   Lymphadenopathy:      Head:      Right side of head: No submental or submandibular adenopathy.      Left side of head: No submental or submandibular adenopathy.      Cervical:      Right cervical: No superficial, deep or posterior cervical adenopathy.     Left cervical: No superficial, deep or posterior cervical adenopathy.      Upper Body:      Right upper body: No supraclavicular adenopathy.      Left upper body: No supraclavicular adenopathy.   Skin:     General: Skin is warm and dry.   Neurological:      Mental Status: She is alert and oriented to person, place, and time.      Cranial Nerves: No cranial nerve deficit.      Sensory: No sensory deficit.   Psychiatric:         Speech: Speech normal.         Behavior: Behavior normal.         Thought Content: Thought content normal.       Blood pressure 118/72, pulse 74, temperature 98.1 °F (36.7 °C), resp. rate 20, height 5' 3" (1.6 m), weight 66.5 kg (146 lb 9.7 oz), last menstrual period 09/20/2006, SpO2 98 %.Body mass index is 25.97 kg/m².          A/P:  1) annual exam.  Health maintenance issues and anticipatory guidance issues were discussed.  She will continue to stay physically active and consume a healthy diet.  We will schedule fasting labs soon.  We will reach out to the GI team to schedule her for her colonoscopy.  She will continue to keep up-to-date with her Pap smears and her mammograms  2) anxiety.  Intermittent.  I did refill her Xanax for rare use.  She understands that Xanax is a benzodiazepine with " the potential for addiction and tolerance.  The patient also understands that all benzos impair reflexes and cognition and so the patient should not drive, operate heavy machinery or make significant decisions while taking the benzodiazepine.  The patient should also not take the benzodiazepines with alcohol      3) left flank pain with nonobstructing nephrolithiasis and scoliosis.  Intermittent.  I did refill her Ultram and Zofran for p.r.n. use.  If this becomes more frequent, she will let us know

## 2023-10-13 ENCOUNTER — TELEPHONE (OUTPATIENT)
Dept: GASTROENTEROLOGY | Facility: CLINIC | Age: 63
End: 2023-10-13
Payer: COMMERCIAL

## 2023-10-13 ENCOUNTER — LAB VISIT (OUTPATIENT)
Dept: LAB | Facility: HOSPITAL | Age: 63
End: 2023-10-13
Attending: FAMILY MEDICINE
Payer: COMMERCIAL

## 2023-10-13 DIAGNOSIS — Z00.00 ANNUAL PHYSICAL EXAM: ICD-10-CM

## 2023-10-13 LAB
ALBUMIN SERPL BCP-MCNC: 4 G/DL (ref 3.5–5.2)
ALP SERPL-CCNC: 64 U/L (ref 55–135)
ALT SERPL W/O P-5'-P-CCNC: 23 U/L (ref 10–44)
ANION GAP SERPL CALC-SCNC: 9 MMOL/L (ref 8–16)
AST SERPL-CCNC: 22 U/L (ref 10–40)
BASOPHILS # BLD AUTO: 0.06 K/UL (ref 0–0.2)
BASOPHILS NFR BLD: 1 % (ref 0–1.9)
BILIRUB SERPL-MCNC: 0.7 MG/DL (ref 0.1–1)
BUN SERPL-MCNC: 13 MG/DL (ref 8–23)
CALCIUM SERPL-MCNC: 9.3 MG/DL (ref 8.7–10.5)
CHLORIDE SERPL-SCNC: 105 MMOL/L (ref 95–110)
CHOLEST SERPL-MCNC: 272 MG/DL (ref 120–199)
CHOLEST/HDLC SERPL: 3.2 {RATIO} (ref 2–5)
CO2 SERPL-SCNC: 27 MMOL/L (ref 23–29)
CREAT SERPL-MCNC: 0.8 MG/DL (ref 0.5–1.4)
DIFFERENTIAL METHOD: NORMAL
EOSINOPHIL # BLD AUTO: 0.1 K/UL (ref 0–0.5)
EOSINOPHIL NFR BLD: 1 % (ref 0–8)
ERYTHROCYTE [DISTWIDTH] IN BLOOD BY AUTOMATED COUNT: 13.4 % (ref 11.5–14.5)
EST. GFR  (NO RACE VARIABLE): >60 ML/MIN/1.73 M^2
ESTIMATED AVG GLUCOSE: 97 MG/DL (ref 68–131)
GLUCOSE SERPL-MCNC: 89 MG/DL (ref 70–110)
HBA1C MFR BLD: 5 % (ref 4–5.6)
HCT VFR BLD AUTO: 39.8 % (ref 37–48.5)
HDLC SERPL-MCNC: 86 MG/DL (ref 40–75)
HDLC SERPL: 31.6 % (ref 20–50)
HGB BLD-MCNC: 13.2 G/DL (ref 12–16)
IMM GRANULOCYTES # BLD AUTO: 0.01 K/UL (ref 0–0.04)
IMM GRANULOCYTES NFR BLD AUTO: 0.2 % (ref 0–0.5)
LDLC SERPL CALC-MCNC: 169.6 MG/DL (ref 63–159)
LYMPHOCYTES # BLD AUTO: 2.3 K/UL (ref 1–4.8)
LYMPHOCYTES NFR BLD: 38.1 % (ref 18–48)
MCH RBC QN AUTO: 30 PG (ref 27–31)
MCHC RBC AUTO-ENTMCNC: 33.2 G/DL (ref 32–36)
MCV RBC AUTO: 91 FL (ref 82–98)
MONOCYTES # BLD AUTO: 0.4 K/UL (ref 0.3–1)
MONOCYTES NFR BLD: 7.5 % (ref 4–15)
NEUTROPHILS # BLD AUTO: 3.1 K/UL (ref 1.8–7.7)
NEUTROPHILS NFR BLD: 52.2 % (ref 38–73)
NONHDLC SERPL-MCNC: 186 MG/DL
NRBC BLD-RTO: 0 /100 WBC
PLATELET # BLD AUTO: 268 K/UL (ref 150–450)
PMV BLD AUTO: 9.9 FL (ref 9.2–12.9)
POTASSIUM SERPL-SCNC: 4.1 MMOL/L (ref 3.5–5.1)
PROT SERPL-MCNC: 7 G/DL (ref 6–8.4)
RBC # BLD AUTO: 4.4 M/UL (ref 4–5.4)
SODIUM SERPL-SCNC: 141 MMOL/L (ref 136–145)
TRIGL SERPL-MCNC: 82 MG/DL (ref 30–150)
TSH SERPL DL<=0.005 MIU/L-ACNC: 1.31 UIU/ML (ref 0.4–4)
WBC # BLD AUTO: 5.9 K/UL (ref 3.9–12.7)

## 2023-10-13 PROCEDURE — 36415 COLL VENOUS BLD VENIPUNCTURE: CPT | Mod: PO | Performed by: FAMILY MEDICINE

## 2023-10-13 PROCEDURE — 80061 LIPID PANEL: CPT | Performed by: FAMILY MEDICINE

## 2023-10-13 PROCEDURE — 85025 COMPLETE CBC W/AUTO DIFF WBC: CPT | Performed by: FAMILY MEDICINE

## 2023-10-13 PROCEDURE — 83036 HEMOGLOBIN GLYCOSYLATED A1C: CPT | Performed by: FAMILY MEDICINE

## 2023-10-13 PROCEDURE — 80053 COMPREHEN METABOLIC PANEL: CPT | Performed by: FAMILY MEDICINE

## 2023-10-13 PROCEDURE — 84443 ASSAY THYROID STIM HORMONE: CPT | Performed by: FAMILY MEDICINE

## 2023-10-13 NOTE — TELEPHONE ENCOUNTER
Colonoscopy is scheduled on 1/12/2024 with Dr. Forrester at [address] [facility] [address] in [place]. After our Pre-service team gets the green light from your insurance, the Preop Nurse will call a couple of days before your procedure date with the arrival time.    Prep instructions has been sent via [facility] and via mail. Address is confirmed. Patient is informed the preop Nurse will call him/her with the arrival time a day or two prior to procedure. Patient verbalizes understanding.     Pt is requesting Dr. Forrester

## 2023-10-13 NOTE — TELEPHONE ENCOUNTER
----- Message from Bethany Tom LPN sent at 10/12/2023  4:06 PM CDT -----    ----- Message -----  From: Sameera Dudley MD  Sent: 10/12/2023   3:19 PM CDT  To: Usama LUCAS Jr. Staff    Dr Forrester noted she is due for a scope again in Nov.  Can you contact her to schedule?  Thank you!  Sameera

## 2023-10-14 ENCOUNTER — TELEPHONE (OUTPATIENT)
Dept: FAMILY MEDICINE | Facility: CLINIC | Age: 63
End: 2023-10-14
Payer: COMMERCIAL

## 2023-10-23 ENCOUNTER — PATIENT MESSAGE (OUTPATIENT)
Dept: FAMILY MEDICINE | Facility: CLINIC | Age: 63
End: 2023-10-23
Payer: COMMERCIAL

## 2023-10-23 NOTE — TELEPHONE ENCOUNTER
The 10-year ASCVD risk score (Omer LONDON, et al., 2019) is: 4.2%    Values used to calculate the score:      Age: 63 years      Sex: Female      Is Non- : No      Diabetic: No      Tobacco smoker: No      Systolic Blood Pressure: 118 mmHg      Is BP treated: No      HDL Cholesterol: 86 mg/dL      Total Cholesterol: 272 mg/dL

## 2023-10-24 ENCOUNTER — TELEPHONE (OUTPATIENT)
Dept: FAMILY MEDICINE | Facility: CLINIC | Age: 63
End: 2023-10-24
Payer: COMMERCIAL

## 2023-12-06 ENCOUNTER — TELEPHONE (OUTPATIENT)
Dept: GASTROENTEROLOGY | Facility: CLINIC | Age: 63
End: 2023-12-06
Payer: COMMERCIAL

## 2023-12-06 NOTE — TELEPHONE ENCOUNTER
----- Message from Lurdes Luo sent at 12/6/2023  3:21 PM CST -----  Regarding: Needs to reschedule colonoscopy  Type: Needs Medical Advice  Who Called:  Renate Butterfield Call Back Number: [phone]    Additional Information: Pt needs to reschedule she said that 1/12 does not work for her please lorena daniels

## 2023-12-08 ENCOUNTER — TELEPHONE (OUTPATIENT)
Dept: GASTROENTEROLOGY | Facility: CLINIC | Age: 63
End: 2023-12-08
Payer: COMMERCIAL

## 2023-12-08 NOTE — TELEPHONE ENCOUNTER
----- Message from Delia Ekta sent at 12/8/2023  9:48 AM CST -----  Regarding: Return Call  Patient Returning Call      Who Called: Pt     Who Left Message for Patient: Bethany    Does the patient know what this is regarding?: Yes     Would the patient rather a call back or a response via [facility]?  Call back    Best Call Back Number: [phone]      Additional Information:  Please Advise - Thank you

## 2023-12-20 ENCOUNTER — TELEPHONE (OUTPATIENT)
Dept: GASTROENTEROLOGY | Facility: CLINIC | Age: 63
End: 2023-12-20
Payer: COMMERCIAL

## 2023-12-20 NOTE — TELEPHONE ENCOUNTER
----- Message from Shamika Ferrell, Patient Care Assistant sent at 12/20/2023  4:46 PM CST -----  Contact: self  Pt is calling to speak with a nurse  to r/s her appt. She would like to have a Friday early morning ( 1/26, 2/2 ) if possible.Please reach out on the patient portal. Thanks

## 2023-12-20 NOTE — TELEPHONE ENCOUNTER
Returned call to the patient, procedure rescheduled with patient per patient request, patient verbalized understanding of this.

## 2024-02-28 ENCOUNTER — TELEPHONE (OUTPATIENT)
Dept: GASTROENTEROLOGY | Facility: CLINIC | Age: 64
End: 2024-02-28
Payer: COMMERCIAL

## 2024-02-28 NOTE — TELEPHONE ENCOUNTER
Prep instructions sent to patient portal.      MiraLAX Prep      ALERT: Please notify the clinic if you start any blood thinners as does affect your procedure  NOTE: NO ASPIRIN or ibuprofen products for the morning of your procedure. This includes Motrin, ALEVE, Advil, or other arthritis type medications. TYLENOL IS ALLOWED.  AVOID the following foods for 7 - 10 days prior to the procedure: Beans, peas, corn, nuts, popcorn, or tomatoes. If you forget we will not cancel your procedure.      **You will need to purchase over-the-counter  -    4 Dulcolax laxative tablets - any brand is fine   -    2 Liter Bottle or 64 oz. of any clear liquid - see list below       (Noncarbonated, Nothing RED or PURPLE)   -    MiraLAX (255 gram / 8 oz ) bottle of powder     The evening before your prep day, at bedtime, take 2 Dulcolax tablets    ONE DAY BEFORE THE PROCEDURE (PREP DAY):   1. You will be on a clear liquid diet the entire day before the procedure.  2. At 10 am you will take 2 more Dulcolax laxative tablets  3. At 5 pm mix one 8oz. glass of clear liquid with one capful of Miralax and drink it entirely.  4. Repeat every 15 minutes until you have finished the 2 liter/ 64 oz. of clear fluid.      It's about 8 - 10 glasses of fluid. You may have some MiraLAX left over.      CLEAR LIQUIDS INCLUDE: Coffee (no cream), tea, apple juice, white grape juice, limit carbonated drinks to 2 in 24 hours, boullion, chicken broth, beef broth, Gatorade, Maynor-Aid, jello, popsicles, snowballs, Italian ice, and hard candy. NO ALCOHOL. NOTHING RED OR PURPLE.    It is important to drink plenty of clear fluids throughout the day prior to the procedure while doing this prep. After midnight  WATER ONLY is allowed, then nothing by mouth 4 hours prior to the procedure time.    A preop nurse will call the day prior to your procedure to discuss medications you can and cannot take the morning of your procedure. Since sedation is used, you must have someone  drive you home. It is [facility] policy that you may not take a taxi home after sedation.         NOTE:  ·         Dulaglutide (Trulicity) (weekly) - hold 8 days  ·         Exenatide extended release (Bydureon bcise) (weekly) - hold 8 days  ·         Semaglutide (Ozempic) (weekly) - hold 8 days  Tirepatide (Mounjaro) (weekly) - hold 8 days  Wegovy (weekly) - hold 8 days  ·         Exenatide (Byetta) (twice daily)- hold DAY OF PROCEDURE  ·         Liraglutide (Victoza, Saxenda) (daily)- hold DAY OF PROCEDURE  ·         Lixisenatide (Adlyxin) (daily)- hold DAY OF PROCEDURE  ·         Semaglutide (Rybelsus) (daily) -hold DAY OF PROCEDURE

## 2024-03-04 ENCOUNTER — TELEPHONE (OUTPATIENT)
Dept: ENDOSCOPY | Facility: HOSPITAL | Age: 64
End: 2024-03-04
Payer: COMMERCIAL

## 2024-03-04 NOTE — TELEPHONE ENCOUNTER
Called and spoke with the patient, patient requests to use mag citrate prep instead of Miralax, patient advised that was fine after review of her chart.

## 2024-03-08 ENCOUNTER — HOSPITAL ENCOUNTER (OUTPATIENT)
Facility: HOSPITAL | Age: 64
Discharge: HOME | End: 2024-03-08
Attending: INTERNAL MEDICINE | Admitting: FAMILY MEDICINE
Payer: COMMERCIAL

## 2024-03-08 ENCOUNTER — ANESTHESIA EVENT (OUTPATIENT)
Dept: ENDOSCOPY | Facility: HOSPITAL | Age: 64
End: 2024-03-08
Payer: COMMERCIAL

## 2024-03-08 ENCOUNTER — ANESTHESIA (OUTPATIENT)
Dept: ENDOSCOPY | Facility: HOSPITAL | Age: 64
End: 2024-03-08
Payer: COMMERCIAL

## 2024-03-08 VITALS
BODY MASS INDEX: 25.69 KG/M2 | OXYGEN SATURATION: 99 % | SYSTOLIC BLOOD PRESSURE: 131 MMHG | TEMPERATURE: 97 F | HEART RATE: 61 BPM | RESPIRATION RATE: 18 BRPM | DIASTOLIC BLOOD PRESSURE: 59 MMHG | HEIGHT: 63 IN | WEIGHT: 145 LBS

## 2024-03-08 DIAGNOSIS — Z86.010 HX OF COLONIC POLYPS: ICD-10-CM

## 2024-03-08 PROCEDURE — 63600175 PHARM REV CODE 636 W HCPCS: Mod: PO | Performed by: INTERNAL MEDICINE

## 2024-03-08 PROCEDURE — 88305 TISSUE EXAM BY PATHOLOGIST: CPT | Mod: 26,,, | Performed by: STUDENT IN AN ORGANIZED HEALTH CARE EDUCATION/TRAINING PROGRAM

## 2024-03-08 PROCEDURE — 37000008 HC ANESTHESIA 1ST 15 MINUTES: Mod: PO | Performed by: INTERNAL MEDICINE

## 2024-03-08 PROCEDURE — 45385 COLONOSCOPY W/LESION REMOVAL: CPT | Mod: 33,PO | Performed by: INTERNAL MEDICINE

## 2024-03-08 PROCEDURE — 45385 COLONOSCOPY W/LESION REMOVAL: CPT | Mod: 33,22,, | Performed by: INTERNAL MEDICINE

## 2024-03-08 PROCEDURE — 25000003 PHARM REV CODE 250: Mod: PO | Performed by: NURSE ANESTHETIST, CERTIFIED REGISTERED

## 2024-03-08 PROCEDURE — D9220A PRA ANESTHESIA: Mod: 33,CRNA,, | Performed by: NURSE ANESTHETIST, CERTIFIED REGISTERED

## 2024-03-08 PROCEDURE — 88305 TISSUE EXAM BY PATHOLOGIST: CPT | Mod: 59,PO | Performed by: STUDENT IN AN ORGANIZED HEALTH CARE EDUCATION/TRAINING PROGRAM

## 2024-03-08 PROCEDURE — 37000009 HC ANESTHESIA EA ADD 15 MINS: Mod: PO | Performed by: INTERNAL MEDICINE

## 2024-03-08 PROCEDURE — 63600175 PHARM REV CODE 636 W HCPCS: Mod: PO | Performed by: NURSE ANESTHETIST, CERTIFIED REGISTERED

## 2024-03-08 PROCEDURE — 27201089 HC SNARE, DISP (ANY): Mod: PO | Performed by: INTERNAL MEDICINE

## 2024-03-08 PROCEDURE — D9220A PRA ANESTHESIA: Mod: 33,ANES,, | Performed by: ANESTHESIOLOGY

## 2024-03-08 RX ORDER — LIDOCAINE HYDROCHLORIDE 20 MG/ML
INJECTION INTRAVENOUS
Status: DISCONTINUED | OUTPATIENT
Start: 2024-03-08 | End: 2024-03-08

## 2024-03-08 RX ORDER — SODIUM CHLORIDE 0.9 % (FLUSH) 0.9 %
10 SYRINGE (ML) INJECTION
Status: DISCONTINUED | OUTPATIENT
Start: 2024-03-08 | End: 2024-03-08 | Stop reason: HOSPADM

## 2024-03-08 RX ORDER — ONDANSETRON HYDROCHLORIDE 2 MG/ML
INJECTION, SOLUTION INTRAVENOUS
Status: DISCONTINUED | OUTPATIENT
Start: 2024-03-08 | End: 2024-03-08

## 2024-03-08 RX ORDER — SODIUM CHLORIDE, SODIUM LACTATE, POTASSIUM CHLORIDE, CALCIUM CHLORIDE 600; 310; 30; 20 MG/100ML; MG/100ML; MG/100ML; MG/100ML
INJECTION, SOLUTION INTRAVENOUS CONTINUOUS
Status: DISCONTINUED | OUTPATIENT
Start: 2024-03-08 | End: 2024-03-08 | Stop reason: HOSPADM

## 2024-03-08 RX ORDER — PROPOFOL 10 MG/ML
VIAL (ML) INTRAVENOUS
Status: DISCONTINUED | OUTPATIENT
Start: 2024-03-08 | End: 2024-03-08

## 2024-03-08 RX ADMIN — PROPOFOL 40 MG: 10 INJECTION, EMULSION INTRAVENOUS at 10:03

## 2024-03-08 RX ADMIN — ONDANSETRON 4 MG: 2 INJECTION, SOLUTION INTRAMUSCULAR; INTRAVENOUS at 10:03

## 2024-03-08 RX ADMIN — GLYCOPYRROLATE 0.2 MG: 0.2 INJECTION, SOLUTION INTRAMUSCULAR; INTRAVENOUS at 10:03

## 2024-03-08 RX ADMIN — LIDOCAINE HYDROCHLORIDE 75 MG: 20 INJECTION INTRAVENOUS at 10:03

## 2024-03-08 RX ADMIN — PROPOFOL 120 MG: 10 INJECTION, EMULSION INTRAVENOUS at 10:03

## 2024-03-08 RX ADMIN — SODIUM CHLORIDE, POTASSIUM CHLORIDE, SODIUM LACTATE AND CALCIUM CHLORIDE: 600; 310; 30; 20 INJECTION, SOLUTION INTRAVENOUS at 09:03

## 2024-03-08 NOTE — H&P
History & Physical - Short Stay  Gastroenterology      SUBJECTIVE:     Procedure: Colonoscopy    Chief Complaint/Indication for Procedure: Screening    History of Present Illness:  Asymptomatic    Toro Landers LPN     10/13/23  9:47 AM  Note     ----- Message -----  From: Sameera Dudley MD  Sent: 10/12/2023   3:19 PM CDT  To: Usama LUCAS Jr. Staff     Dr Forrester noted she is due for a scope again in Nov.  Can you contact her to schedule?  Thank you!  Sameera          Office Visit   10/12/2023  [facility]       Sameera Dudley MD  Family Medicine Annual physical exam +2 more  Dx Annual Exam  Reason for Visit     Progress Notes    Sameera Dudley MD at 10/12/2023  3:00 PM    Status: Signed   Expand All Collapse All  Subjective:         Subjective   Patient ID: Renate Vang is a 63 y.o. female.     Chief Complaint: Annual Exam     HPI  The patient is a 63-year-old who is here today for her annual exam.  She does need forms completed for work.  She would be willing to have fasting labs soon.  She does stay physically active and tries to consume a healthy diet.  Her Pap smear and med her mammogram are up-to-date.  She is due for her colonoscopy later this year.     While she is here today, she would like to have a refill of her Xanax.  She uses half of a Xanax 0.25 mg tablet very sparingly for times when she is really anxious and used slow her brain down.  She last received a prescription for 40 Xanax in February.     While she is here, she would also like to have refill of her Ultram and a nausea medication.  She has had recurrent episodes of left flank pain.  She believes that her left flank pain is caused by kidney stones although she has only been found to have nonobstructing stones.  She believes that her left flank pain occurs when she is dehydrated which will cause problems with her stones.  She did see a nurse practitioner with urology who did not think this was the cause of  her symptoms but the patient is convinced that it is.  When she has a flare in this left flank pain, she will usually take 2 Ultram doses and eventually get complete relief of her pain.  She does request a nausea medicine because she always gets nauseated with pain medicine.  We did discuss the scoliosis noted on her imaging and that her flank pain may be musculoskeletal but she is convinced that it is really her kidney stones causing this pain.     Review of Systems   Constitutional:  Negative for appetite change, chills, diaphoresis, fatigue, fever and unexpected weight change.   HENT:  Negative for congestion, dental problem, ear pain, hearing loss, postnasal drip, rhinorrhea, sneezing, sore throat and trouble swallowing.    Eyes:  Negative for photophobia, pain, discharge and visual disturbance.   Respiratory:  Negative for cough, chest tightness, shortness of breath and wheezing.    Cardiovascular:  Negative for chest pain, palpitations and leg swelling.   Gastrointestinal:  Negative for abdominal distention, abdominal pain, blood in stool, constipation, diarrhea, nausea and vomiting.     A/P:  1) annual exam.  Health maintenance issues and anticipatory guidance issues were discussed.  She will continue to stay physically active and consume a healthy diet.  We will schedule fasting labs soon.  We will reach out to the GI team to schedule her for her colonoscopy.  She will continue to keep up-to-date with her Pap smears and her mammograms  2) anxiety.  Intermittent.  I did refill her Xanax for rare use.  She understands that Xanax is a benzodiazepine with the potential for addiction and tolerance.  The patient also understands that all benzos impair reflexes and cognition and so the patient should not drive, operate heavy machinery or make significant decisions while taking the benzodiazepine.  The patient should also not take the benzodiazepines with alcohol      3) left flank pain with nonobstructing  nephrolithiasis and scoliosis.  Intermittent.  I did refill her Ultram and Zofran for p.r.n. use.  If this becomes more frequent, she will let us know            See last Colonoscopy 11/6/2018  Indications:        High risk colon cancer surveillance: Personal                        history of colonic polyps, Last colonoscopy:                        September 20, 2013   Providers:           Khanh Forrester MD   Impression:          - Two 3 to 4 mm polyps at the hepatic flexure,                        removed with a cold snare. Resected and retrieved.                        - Non-bleeding internal hemorrhoids.                        - The examination was otherwise normal.                        - The examined portion of the ileum was normal.   Recommendation:      - Discharge patient to home.                        - Await pathology results.                        - If the pathology report reveals adenomatous                        tissue, then repeat the colonoscopy for surveillance                        in 5 years.                        - If the pathology report indicates hyperplastic                        polyp, then repeat colonoscopy for surveillance in 7                        years.                        - High fiber diet.                        - Take a PROBIOTIC, such as a carton of GREEK YOGURT                        (Chobani or Oikos, or Activia or Dannon); or tablets                        of ALIGN or CULTURELLE or TIO-Q (all                        non-prescription), every day for a month.                        - Call the G.I[facility] clinic in 2 weeks for reports (if                        you haven't heard from us sooner) [phone].                        - Continue present medications.                        - Return to normal activities tomorrow.   Khanh Forrester MD   11/6/2018     FINAL PATHOLOGIC DIAGNOSIS COLON, HEPATIC FLEXURE, POLYPECTOMY:   Multiple fragments of tubular adenoma (s)    Negative for high-grade dysplasia or malignancy          PTA Medications   Medication Sig    ALPRAZolam (XANAX) 0.25 MG tablet Take 1 tablet (0.25 mg total) by mouth daily as needed for Anxiety.    b complex vitamins tablet Take 1 tablet by mouth once daily.    calcium-vitamin D (OSCAL) 250 (625)-125 mg-unit per tablet Take 1 tablet by mouth once daily.    diltiaZEM (CARDIZEM CD) 180 MG 24 hr capsule Take 1 capsule (180 mg total) by mouth once daily.    estradioL (ESTRACE) 0.01 % (0.1 mg/gram) vaginal cream Place vaginally.    glucosamine-chondroitin 500-400 mg tablet Take 1 tablet by mouth 2 (two) times daily.    Lactobacillus rhamnosus GG (CULTURELLE) 10 billion cell capsule Take 1 capsule by mouth once daily.    magnesium oxide (MAG-OX) 400 mg (241.3 mg magnesium) tablet Take 400 mg by mouth once daily.    RESTASIS 0.05 % ophthalmic emulsion     traMADoL (ULTRAM) 50 mg tablet Take 1 tablet (50 mg total) by mouth 2 (two) times daily as needed for Pain.    vit A/vit C/vit E/zinc/copper (ICAPS AREDS ORAL) Take by mouth.    vitamin D (VITAMIN D3) 1000 units Tab Take 600 Units by mouth once daily.     vitamin E 1000 UNIT capsule Take 1,000 Units by mouth once daily. 5000 UNITS    zinc gluconate 50 mg tablet Take 25 mg by mouth once daily.        Review of patient's allergies indicates:   Allergen Reactions    Propranolol Other (See Comments)     Memory     Wellbutrin [bupropion hcl] Other (See Comments)     Memory loss     Adhesive Rash     Blisters with band-aids and surgical dressing        Past Medical History:   Diagnosis Date    Abnormal Pap smear of cervix     Anxiety resolved    Breast cancer     Depression     History of abnormal cervical Pap smear     History of basal cell carcinoma 03/2011    right axilla    History of breast cancer 2003    at 42 s/p surg and radiation [id number] ER+    History of HPV infection     LOW RISK    History of parathyroidectomy     R lower gland removed due to adenoma    Kidney  stones     Osteopenia     last dexa - refuses additional testing     PONV (postoperative nausea and vomiting)     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)      Past Surgical History:   Procedure Laterality Date    AUGMENTATION OF BREAST      replaced 2008    BREAST BIOPSY      BREAST LUMPECTOMY  2003    x2 Right with senital node bx, with radiation    BREAST RECONSTRUCTION      s/p lumpectomy to replace previous implants bilaterally    BREAST SURGERY      breast augmentation prior to lumpectomy    cataract surgery       SECTION, LOW TRANSVERSE      x2    COLONOSCOPY N/A 2018    Procedure: COLONOSCOPY;  Surgeon: Khanh Forrester Jr., MD;  Location: [facility];  Service: Endoscopy;  Laterality: N/A;    COLONOSCOPY W/ POLYPECTOMY  2013    HERBIE.   One 1 mm polyp at the hepatic flexure. TUBULAR ADENOMATOUS POLYP.  One 1 mm polyp in the cecum. TUBULAR ADENOMATOUS POLYP.  The examination was otherwise normal.      HERNIA REPAIR      bilateral groin     LASER ABLATION OF CONDYLOMAS      MALIGNANT SKIN LESION EXCISION      nose & under right arm    PARATHYROID GLAND SURGERY      R lower gland removed due to adenoma    TONSILLECTOMY       Family History   Problem Relation Age of Onset    Glaucoma Mother     Osteoarthritis Mother     Arrhythmia Mother     Fibromyalgia Mother     Kidney disease Mother     Hypertension Father     Hashimoto's thyroiditis Sister     Anxiety disorder Sister     Cancer Maternal Uncle         colon    Heart disease Maternal Grandmother     COPD Maternal Grandmother     Tuberculosis Maternal Grandmother     Hypertension Maternal Grandmother     COPD Maternal Grandfather     Osteoarthritis Maternal Grandfather     COPD Paternal Grandfather     Breast cancer Cousin         40's    Ovarian cancer Neg Hx      Social History     Tobacco Use    Smoking status: Never    Smokeless tobacco: Never   Substance Use Topics    Alcohol use: Yes     Alcohol/week: 4.0 standard  "drinks of alcohol     Types: 4 Glasses of wine per week     Comment: 2 drinks three times per week    Drug use: Yes     Types: Benzodiazepines         OBJECTIVE:     Vital Signs (Most Recent)  Temp: 97.3 °F (36.3 °C) (03/08/24 0912)  Pulse: 63 (03/08/24 0912)  Resp: 17 (03/08/24 0912)  BP: 133/63 (03/08/24 0912)  SpO2: 98 % (03/08/24 0912)    Physical Exam:  : Ht: 5' 3" (160 cm)   Wt: 65.8 kg (145 lb)   BMI: 25.69 kg/m² .                                                       GENERAL:  Comfortable, in no acute distress.                                 HEENT EXAM:  Nonicteric.  No adenopathy.  Oropharynx is clear.               NECK:  Supple.                                                               LUNGS:  Clear.                                                               CARDIAC:  Regular rate and rhythm.  S1, S2.  No murmur.                      ABDOMEN:  Soft, positive bowel sounds, nontender.  No hepatosplenomegaly or masses.  No rebound or guarding.                                             EXTREMITIES:  No edema.     MENTAL STATUS:  Alert and oriented.    ASSESSMENT/PLAN:     Assessment: Surveillance, Hx of colon polyps    Plan: Colonoscopy    Anesthesia Plan:   MAC / General Anaesthesia    ASA Grade: ASA 2 - Patient with mild systemic disease with no functional limitations    MALLAMPATI SCORE: I (soft palate, uvula, fauces, and tonsillar pillars visible)    "

## 2024-03-08 NOTE — DISCHARGE INSTRUCTIONS
Recovery After Procedural Sedation (Adult)   You have been given medicine by vein to make you sleep during your surgery. This may have included both a pain medicine and sleeping medicine. Most of the effects have worn off. But you may still have some drowsiness for the next 6 to 8 hours.  Home care  Follow these guidelines when you get home:  For the next 8 hours, you should be watched by a responsible adult. This person should make sure your condition is not getting worse.  Don't drink any alcohol for the next 24 hours.  Don't drive, operate dangerous machinery, or make important business or personal decisions during the next 24 hours.  To prevent injury or falls, use caution when standing and walking for at least 24 hours after your procedure.  Note: Your healthcare provider may tell you not to take any medicine by mouth for pain or sleep in the next 4 hours. These medicines may react with the medicines you were given in the hospital. This could cause a much stronger response than usual.  Follow-up care  Follow up with your healthcare provider if you are not alert and back to your usual level of activity within 12 hours.  When to seek medical advice  Call your healthcare provider right away if any of these occur:  Drowsiness gets worse  Weakness or dizziness gets worse  Repeated vomiting  You can't be awakened  Fever  New rash  [facility] last reviewed this educational content on 9/1/2019  © 2870-5545 [employer]. [address] [place] [address], [place] [id number]. All rights reserved. This information is not intended as a substitute for professional medical care. Always follow your healthcare professional's instructions         High-Fiber Diet  Fiber is in fruits, vegetables, cereals, and grains. Fiber passes through your body undigested. A high-fiber diet helps food move through your intestinal tract. The added bulk is helpful in preventing constipation. In people with diverticulosis, fiber helps clean out  the pouches along the colon wall. It also prevents new pouches from forming. A high-fiber diet reduces the risk of colon cancer. It also lowers blood cholesterol and prevents high blood sugar in people with diabetes.    The fiber-rich foods listed below should be part of your diet. If you are not used to high-fiber foods, start with 1 or 2 foods from this list. Every 3 to 4 days add a new one to your diet. Do this until you are eating 4 high-fiber foods per day. This should give you 20 to 35 grams of fiber a day. It is also important to drink a lot of water when you are on this diet. You should have 6 to 8 glasses of water a day. Water makes the fiber swell and increases the benefit.  Foods high in dietary fiber  The following foods are high in dietary fiber:  Breads. Breads made with 100% whole-wheat flour; suzanna, wheat, or rye crackers; whole-grain tortillas, bran muffins.  Cereals. Whole-grain and bran cereals with bran (shredded wheat, wheat flakes, raisin bran, corn bran); oatmeal, rolled oats, granola, and brown rice.  Fruits. Fresh fruits and their edible skins (pears, prunes, raisins, berries, apples, and apricots); bananas, citrus fruit, mangoes, pineapple; and prune juice.  Nuts. Any nuts and seeds.  Vegetables. Best served raw or lightly cooked. All types, especially: green peas, celery, eggplant, potatoes, spinach, broccoli, White sprouts, winter squash, carrots, cauliflower, soybeans, lentils, and fresh and dried beans of all kinds.  Other. Popcorn, any spices.  Date Last Reviewed: 8/1/2016  © 4160-2612 [employer] [address] [place] [address], [place] [address]. All rights reserved. This information is not intended as a substitute for professional medical care. Always follow your healthcare professional's instructions.

## 2024-03-08 NOTE — [employer] PATIENT INSTRUCTIONS
Discharge Summary/Instructions for after Colonoscopy with   Biopsy/Polypectomy  Renate Vang    Friday, March 8, 2024  Khanh Forrester MD  RESTRICTIONS ON ACTIVITY:  - Do not drive a car or operate machinery until the day after the procedure.      - The following day: return to full activity including work.  - For  3 days: No heavy lifting, straining or running.  - Diet: You can have solid foods, but no gassy foods (i.e. beans, broccoli,   cabbage, etc).  TREATMENT FOR COMMON SIDE EFFECTS:  - Mild abdominal pain and bloating or excessive gas: rest, eat lightly and   use a heating pad.  SYMPTOMS TO WATCH FOR AND REPORT TO YOUR PHYSICIAN:  1. Severe abdominal pain.  2. Fever within 24 hours after a procedure.  3. A large amount of rectal bleeding. (A small amount of blood from the   rectum is not serious, especially if hemorrhoids are present.  3.  Because air was put into your colon during the procedure, expelling   large amounts of air from your rectum is normal.  4.  You may not have a bowel movement for 1-3 days because of the   colonoscopy prep.  This is normal.  5.  Call immediately if you notice any of the following:   Chills and/or fever over 101   Persistent vomiting   Severe abdominal pain, other than gas cramps   Severe chest pain   Black, tarry stools   Any bleeding - exceeding one tablespoon  Your doctor recommends these additional instructions:  We are waiting for your pathology results.   Your physician has recommended a repeat colonoscopy in 5 years for   surveillance.   Eat a high fiber diet.   Take a fiber supplement, for example Citrucel, Fibercon, Konsyl or   Metamucil.  None  If you have any questions or problems, please call your physician.  EMERGENCY PHONE NUMBER: [phone]  LAB RESULTS: Call in two (2) weeks for lab results, [phone]  ___________________________________________  Nurse Signature  ___________________________________________  Patient/Designated Responsible Party  Signature  Khanh Forrester MD  3/8/2024 11:10:21 AM  This report has been verified and signed electronically.  Dear patient,  As a result of recent federal legislation (The Federal Cures Act), you may   receive lab or pathology results from your procedure in your [facility]   account before your physician is able to contact you. Your physician or   their representative will relay the results to you with their   recommendations at their soonest availability.  Thank you.  [employer]

## 2024-03-08 NOTE — BRIEF OP NOTE
Discharge Note  Short Stay      SUMMARY     Admit Date: 3/8/2024    Attending Physician: Khanh Forrester Jr., MD     Discharge Physician: Khanh Forrester Jr., MD    Discharge Date: 3/8/2024 11:12 AM    Final Diagnosis: History of colon polyps [Z86.010]    Impression:            - One <2 mm polyp at the hepatic flexure, removed                          with a cold snare. Resected and retrieved.                          - Three 2 to 4 mm polyps in the ascending colon,                          removed with a cold snare. Resected and retrieved.                          - Non-bleeding internal hemorrhoids.                          - Redundant colon.                          - The examination was otherwise normal.                          - The examined portion of the ileum was normal.   Recommendation:        - Discharge patient to home.                          - Await pathology results.                          - Repeat colonoscopy in 5 years for surveillance.                          - High fiber diet.                          - Use fiber, for example Citrucel, Fibercon,                          Konsyl or Metamucil.                          - Continue present medications.                          - Patient has a contact number available for                          emergencies. The signs and symptoms of potential                          delayed complications were discussed with the                          patient. Return to normal activities tomorrow.                          Written discharge instructions were provided to                          the patient.                          - Return to normal activities tomorrow.   Khanh Forrester MD   3/8/2024       Disposition: HOME OR SELF CARE    Patient Instructions:   Current Discharge Medication List        CONTINUE these medications which have NOT CHANGED    Details   ALPRAZolam (XANAX) 0.25 MG tablet Take 1 tablet (0.25 mg total) by mouth daily as  needed for Anxiety.  Qty: 30 tablet, Refills: 0      b complex vitamins tablet Take 1 tablet by mouth once daily.      calcium-vitamin D (OSCAL) 250 (625)-125 mg-unit per tablet Take 1 tablet by mouth once daily.      diltiaZEM (CARDIZEM CD) 180 MG 24 hr capsule Take 1 capsule (180 mg total) by mouth once daily.  Qty: 90 capsule, Refills: 3    Comments: .  Associated Diagnoses: Premature atrial contractions; Palpitations      estradioL (ESTRACE) 0.01 % (0.1 mg/gram) vaginal cream Place vaginally.      glucosamine-chondroitin 500-400 mg tablet Take 1 tablet by mouth 2 (two) times daily.      Lactobacillus rhamnosus GG (CULTURELLE) 10 billion cell capsule Take 1 capsule by mouth once daily.      magnesium oxide (MAG-OX) 400 mg (241.3 mg magnesium) tablet Take 400 mg by mouth once daily.      RESTASIS 0.05 % ophthalmic emulsion       traMADoL (ULTRAM) 50 mg tablet Take 1 tablet (50 mg total) by mouth 2 (two) times daily as needed for Pain.  Qty: 30 each, Refills: 0    Comments: Quantity prescribed more than 7 day supply? Yes, quantity medically necessary  Associated Diagnoses: Flank pain      vit A/vit C/vit E/zinc/copper (ICAPS AREDS ORAL) Take by mouth.      vitamin D (VITAMIN D3) 1000 units Tab Take 600 Units by mouth once daily.       vitamin E 1000 UNIT capsule Take 1,000 Units by mouth once daily. 5000 UNITS      zinc gluconate 50 mg tablet Take 25 mg by mouth once daily.              Discharge Procedure Orders (must include Diet, Follow-up, Activity)    Follow Up:  Follow up with PCP as per your routine.  Please follow a high fiber diet.  Continue Probiotics.  Activity as tolerated.    No driving day of procedure.

## 2024-03-08 NOTE — PLAN OF CARE
Patient tolerating oral liquids without difficulty. No apparent s&s of distress noted at this time, no complaints voiced at this time. Discharge instructions reviewed with patient/family/friend with good verbal feedback received. Patient ready for discharge.

## 2024-03-08 NOTE — TRANSFER OF CARE
"Anesthesia Transfer of Care Note    Patient: Renate Vang    Procedure(s) Performed: Procedure(s) (LRB):  COLONOSCOPY (N/A)    Patient location: PACU    Anesthesia Type: general    Transport from OR: Transported from OR on room air with adequate spontaneous ventilation    Post pain: adequate analgesia    Post assessment: no apparent anesthetic complications and tolerated procedure well    Post vital signs: stable    Level of consciousness: awake, alert and oriented    Nausea/Vomiting: no nausea/vomiting    Complications: none    Transfer of care protocol was followed    Last vitals: Visit Vitals  /63 (BP Location: Right arm, Patient Position: Lying)   Pulse 63   Temp 36.3 °C (97.3 °F) (Temporal)   Resp 17   Ht 5' 3" (1.6 m)   Wt 65.8 kg (145 lb)   LMP 09/20/2006   SpO2 98%   Breastfeeding No   BMI 25.69 kg/m²     "

## 2024-03-11 NOTE — ANESTHESIA PREPROCEDURE EVALUATION
03/11/2024  Renate Vang is a 63 y.o., female.    Anesthesia Evaluation    I have reviewed the NPO Status.   I have reviewed the Medications.     Review of Systems  Anesthesia Hx:  No problems with previous Anesthesia                Social:  Non-Smoker, No Alcohol Use       Hematology/Oncology:  Hematology Normal                                     Cardiovascular:  Cardiovascular Normal Exercise tolerance: good                                           Pulmonary:      Shortness of breath                  Renal/:  Chronic Renal Disease                Hepatic/GI:  Hepatic/GI Normal                 Neurological:  Neurology Normal                                      Endocrine:  Endocrine Normal            Psych:  Psychiatric History                  Physical Exam  General:  Well nourished       Airway/Jaw/Neck:  Airway Findings: Mouth Opening: Normal     Tongue: Normal      General Airway Assessment: Adult, Average      Mallampati: II    Jaw/Neck Findings:     Neck ROM: Normal ROM           Chest/Lungs:  Chest/Lungs Findings:  Clear to auscultation, Normal Respiratory Rate       Heart/Vascular:  Heart Findings: Rate: Normal  Rhythm: Regular Rhythm  Sounds: Normal  Heart murmur: negative                     Mental Status:  Mental Status Findings:  Cooperative, Alert and Oriented         Anesthesia Plan  Type of Anesthesia, risks & benefits discussed:  Anesthesia Type:  general    Patient's Preference:   Plan Factors:          Intra-op Monitoring Plan: standard ASA monitors  Intra-op Monitoring Plan Comments:   Post Op Pain Control Plan:   Post Op Pain Control Plan Comments:     Induction:    Beta Blocker:  Patient is not currently on a Beta-Blocker (No further documentation required).       Informed Consent: Informed consent signed with the Patient and all parties understand the risks and agree with  anesthesia plan.  All questions answered.  Anesthesia consent signed with patient.  ASA Score: 2     Day of Surgery Review of History & Physical:              Ready For Surgery From Anesthesia Perspective.             Physical Exam  General: Well nourished    Airway:  Mallampati: II   Mouth Opening: Normal  Tongue: Normal  Neck ROM: Normal ROM    Chest/Lungs:  Clear to auscultation, Normal Respiratory Rate    Heart:  Rate: Normal  Rhythm: Regular Rhythm  Sounds: Normal        Anesthesia Plan  Type of Anesthesia, risks & benefits discussed:    Anesthesia Type: general  Intra-op Monitoring Plan: standard ASA monitors  Informed Consent: Informed consent signed with the Patient and all parties understand the risks and agree with anesthesia plan.  All questions answered.   ASA Score: 2    Ready For Surgery From Anesthesia Perspective.     .

## 2024-03-11 NOTE — ANESTHESIA POSTPROCEDURE EVALUATION
"Chief Complaint   Patient presents with     Transplant Evaluation     TP IAT       Initial /78 (BP Location: Right arm, Patient Position: Chair, Cuff Size: Adult Regular)  Pulse 71  Temp 97.7  F (36.5  C) (Oral)  Resp 18  Ht 1.6 m (5' 3\")  Wt 89.9 kg (198 lb 3.1 oz)  SpO2 99%  BMI 35.11 kg/m2 Estimated body mass index is 35.11 kg/(m^2) as calculated from the following:    Height as of this encounter: 1.6 m (5' 3\").    Weight as of this encounter: 89.9 kg (198 lb 3.1 oz).  Medication Reconciliation: complete   Harsh Beckham LPN 12:59 PM 04/19/18        " Anesthesia Post Evaluation    Patient: Renate Vang    Procedure(s) Performed: Procedure(s) (LRB):  COLONOSCOPY (N/A)    Final Anesthesia Type: general      Patient location during evaluation: PACU  Patient participation: Yes- Able to Participate  Level of consciousness: awake and alert and oriented  Post-procedure vital signs: reviewed and stable  Pain management: adequate  Airway patency: patent    PONV status at discharge: No PONV  Anesthetic complications: no      Cardiovascular status: blood pressure returned to baseline  Respiratory status: unassisted, spontaneous ventilation and room air  Hydration status: euvolemic  Follow-up not needed.              Vitals Value Taken Time   /59 03/08/24 1121   Temp  03/11/24 1029   Pulse 61 03/08/24 1121   Resp 18 03/08/24 1121   SpO2 99 % 03/08/24 1121         Event Time   Out of Recovery 11:30:44         Pain/Jose Score: No data recorded

## 2024-03-13 LAB
FINAL PATHOLOGIC DIAGNOSIS: NORMAL
GROSS: NORMAL
Lab: NORMAL

## 2024-04-26 ENCOUNTER — OFFICE VISIT (OUTPATIENT)
Dept: DERMATOLOGY | Facility: CLINIC | Age: 64
End: 2024-04-26
Payer: COMMERCIAL

## 2024-04-26 DIAGNOSIS — Z85.828 HISTORY OF NONMELANOMA SKIN CANCER: ICD-10-CM

## 2024-04-26 DIAGNOSIS — D23.9 DERMATOFIBROMA: ICD-10-CM

## 2024-04-26 DIAGNOSIS — D22.9 MULTIPLE BENIGN NEVI: ICD-10-CM

## 2024-04-26 DIAGNOSIS — D18.00 HEMANGIOMA, UNSPECIFIED SITE: ICD-10-CM

## 2024-04-26 DIAGNOSIS — L81.4 SOLAR LENTIGO: ICD-10-CM

## 2024-04-26 DIAGNOSIS — D48.5 NEOPLASM OF UNCERTAIN BEHAVIOR OF SKIN: Primary | ICD-10-CM

## 2024-04-26 DIAGNOSIS — L82.1 SEBORRHEIC KERATOSES: ICD-10-CM

## 2024-04-26 PROCEDURE — 88341 IMHCHEM/IMCYTCHM EA ADD ANTB: CPT | Performed by: PATHOLOGY

## 2024-04-26 PROCEDURE — 99999 PR PBB SHADOW E&M-EST. PATIENT-LVL III: CPT | Mod: PBBFAC,,, | Performed by: STUDENT IN AN ORGANIZED HEALTH CARE EDUCATION/TRAINING PROGRAM

## 2024-04-26 PROCEDURE — 88305 TISSUE EXAM BY PATHOLOGIST: CPT | Mod: 26,,, | Performed by: PATHOLOGY

## 2024-04-26 PROCEDURE — 88342 IMHCHEM/IMCYTCHM 1ST ANTB: CPT | Mod: 59 | Performed by: PATHOLOGY

## 2024-04-26 PROCEDURE — 11103 TANGNTL BX SKIN EA SEP/ADDL: CPT | Mod: S$GLB,,, | Performed by: STUDENT IN AN ORGANIZED HEALTH CARE EDUCATION/TRAINING PROGRAM

## 2024-04-26 PROCEDURE — 88342 IMHCHEM/IMCYTCHM 1ST ANTB: CPT | Mod: 26,,, | Performed by: PATHOLOGY

## 2024-04-26 PROCEDURE — 99213 OFFICE O/P EST LOW 20 MIN: CPT | Mod: 25,S$GLB,, | Performed by: STUDENT IN AN ORGANIZED HEALTH CARE EDUCATION/TRAINING PROGRAM

## 2024-04-26 PROCEDURE — 11102 TANGNTL BX SKIN SINGLE LES: CPT | Mod: S$GLB,,, | Performed by: STUDENT IN AN ORGANIZED HEALTH CARE EDUCATION/TRAINING PROGRAM

## 2024-04-26 PROCEDURE — 88341 IMHCHEM/IMCYTCHM EA ADD ANTB: CPT | Mod: 26,,, | Performed by: PATHOLOGY

## 2024-04-26 PROCEDURE — 88305 TISSUE EXAM BY PATHOLOGIST: CPT | Performed by: PATHOLOGY

## 2024-04-26 NOTE — PROGRESS NOTES
Patient Information  Name: Renate Vang  : 1960  MRN: [id number]     Referring Physician:  Dr. Banks ref. provider found   Primary Care Physician:  Sameera Hdez MD   Date of Visit: 2024      Subjective:       Renate Vang is a 63 y.o. female who presents for   Chief Complaint   Patient presents with    Skin Check     Hx of BCC.     HPI  Patient here for skin check.     Does patient have a personal hx of skin cancers? Yes, NMSC  Does patient have family hx of melanoma?  no  Does patient have hx of strong sun exposure or tanning bed use in the past? yes    Patient was last seen:Visit date not found     Prior notes by myself reviewed.   Clinical documentation obtained by nursing staff reviewed.    Review of Systems   Skin:  Negative for itching and rash.        Objective:    Physical Exam   Constitutional: She appears well-developed and well-nourished. No distress.   Neurological: She is alert and oriented to person, place, and time. She is not disoriented.   Psychiatric: She has a normal mood and affect.   Skin:   Areas Examined (abnormalities noted in diagram):   Scalp / Hair Palpated and Inspected  Head / Face Inspection Performed  Neck Inspection Performed  Chest / Axilla Inspection Performed  Abdomen Inspection Performed  Genitals / Buttocks / Groin Inspection Performed  Back Inspection Performed  RUE Inspected  LUE Inspection Performed  RLE Inspected  LLE Inspection Performed  Nails and Digits Inspection Performed                       Diagram Legend     Erythematous scaling macule/papule c/w actinic keratosis       Vascular papule c/w angioma      Pigmented verrucoid papule/plaque c/w seborrheic keratosis      Yellow umbilicated papule c/w sebaceous hyperplasia      Irregularly shaped tan macule c/w lentigo     1-2 mm smooth white papules consistent with Milia      Movable subcutaneous cyst with punctum c/w epidermal inclusion cyst      Subcutaneous movable cyst c/w pilar cyst      Firm  pink to brown papule c/w dermatofibroma      Pedunculated fleshy papule(s) c/w skin tag(s)      Evenly pigmented macule c/w junctional nevus     Mildly variegated pigmented, slightly irregular-bordered macule c/w mildly atypical nevus      Flesh colored to evenly pigmented papule c/w intradermal nevus       Pink pearly papule/plaque c/w basal cell carcinoma      Erythematous hyperkeratotic cursted plaque c/w SCC      Surgical scar with no sign of skin cancer recurrence      Open and closed comedones      Inflammatory papules and pustules      Verrucoid papule consistent consistent with wart     Erythematous eczematous patches and plaques     Dystrophic onycholytic nail with subungual debris c/w onychomycosis     Umbilicated papule    Erythematous-base heme-crusted tan verrucoid plaque consistent with inflamed seborrheic keratosis     Erythematous Silvery Scaling Plaque c/w Psoriasis     See annotation            [] Data reviewed  [] Independent review of test  [] Management discussed with another provider    Assessment / Plan:      Pathology Orders:       Normal Orders This Visit    Specimen to Pathology, Dermatology     Questions:    Procedure Type: Dermatology and skin neoplasms    Number of Specimens: 2    ------------------------: -------------------------    Spec 1 Procedure: Biopsy    Spec 1 Clinical Impression: r/o NMSC    Spec 1 Source: left leg    ------------------------: -------------------------    Spec 2 Procedure: Biopsy    Spec 2 Clinical Impression: junctional nevus, r/o atypia    Spec 2 Source: right leg    Release to patient: Immediate          Neoplasm of uncertain behavior of skin  -     Specimen to Pathology, Dermatology  Shave biopsy procedure note:    Shave biopsy performed after verbal consent including risk of infection, scar, recurrence, need for additional treatment of site. Area prepped with alcohol, anesthetized with approximately 1.0cc of 1% lidocaine with epinephrine. Lesional tissues  x2 shaved with dermablade. Hemostasis achieved with application of aluminum chloride. No complications. Dressing applied. Wound care explained.    History of nonmelanoma skin cancer  Area of previous nonmelanoma examined. Site well healed with no signs of recurrence.    Total body skin examination performed today including at least 12 points as noted in physical examination. Suspicious lesions noted.    Recommend daily sun protection/avoidance, use of at least SPF 30, broad spectrum sunscreen (OTC drug), skin self examinations, and routine physician surveillance to optimize early detection    Dermatofibroma  This is a benign scar-like lesion secondary to minor trauma. No treatment required.     Solar lentigo  This is a benign hyperpigmented sun induced lesion. Recommend daily sun protection/avoidance and use of at least SPF 30, broad spectrum sunscreen (OTC drug) will reduce the number of new lesions. Treatment of these benign lesions are considered cosmetic.    Hemangioma, unspecified site  This is a benign vascular lesion. Reassurance given. No treatment required.     Multiple benign nevi  Discussed ABCDE's of nevi.  Monitor for new mole or moles that are becoming bigger, darker, irritated, or developing irregular borders. Brochure provided. Instructed patient to observe lesion(s) for changes and follow up in clinic if changes are noted. Patient to monitor skin at home for new or changing lesions.     Seborrheic keratoses  These are benign inherited growths without a malignant potential. Reassurance given to patient. No treatment is necessary.              LOS NUMBER AND COMPLEXITY OF PROBLEMS    COMPLEXITY OF DATA RISK TOTAL TIME (m)   27727  62683 [] 1 self-limited or minor problem [x] Minimal to none [] No treatment recommended or patient to monitor 15-29  10-19   26509  92538 Low  [] 2 or > self limited or minor problems  [] 1 stable chronic illness  [] 1 acute, uncomplicated illness or injury Limited (2)  []  Prior external notes from each unique source  [] Review result of each unique test  [] Order each unique test [x]  Low  OTC medications, minor skin biopsy 30-44  20-29   88440  10541 Moderate  []  1 or > chronic illness with progression, exacerbation or SE of treatment  [x]  2 or more stable chronic illnesses  []  1 acute illness with systemic symptoms  []  1 acute complicated injury  []  1 undiagnosed new problem with uncertain prognosis Moderate (1/3 below)  []  3 or more data items        *Now includes assessment requiring independent historian  []  Independent interpretation of a test  []  Discuss management/test with another provider Moderate  []  Prescription drug mgmt  []  Minor surgery with risk discussed  []  Mgmt limited by social determinates 45-59  30-39   41304  43967 High  []  1 or more chronic illness with severe exacerbation, progression or SE of treatment  []  1 acute or chronic illness/injury that poses a threat to life or bodily function Extensive (2/3 below)  []  3 or more data items        *Now includes assessment requiring independent historian.  []  Independent interpretation of a test  []  Discuss management/test with another provider High  []  Major surgery with risk discussed  []  Drug therapy requiring intensive monitoring for toxicity  []  Hospitalization  []  Decision for DNR 60-74  40-54      No follow-ups on file.    Hanna Mcnamara MD, FAAD  [facility]

## 2024-04-26 NOTE — PATIENT INSTRUCTIONS
Shave Biopsy Wound Care    Your doctor has performed a shave biopsy today.  A band aid and vaseline ointment has been placed over the site.  This should remain in place for NO LONGER THAN 48 hours.  It is fine to remove the bandaid after 24 hours, if the area is no longer bleeding. It is recommended that you keep the area dry (do not wet)) for the first 24 hours.  After 24 hours, wash the area with warm soap and water and apply Vaseline jelly.  Many patients prefer to use Neosporin or Bacitracin ointment.  This is acceptable; however, know that you can develop an allergy to this medication even if you have used it safely for years.  It is important to keep the area moist.  Letting it dry out and get air slows healing time, and will worsen the scar.        If you notice increasing redness, tenderness, pain, or yellow drainage at the biopsy site, please notify your doctor.  These are signs of an infection.    If your biopsy site is bleeding, apply firm pressure for 15 minutes straight.  Repeat for another 15 minutes, if it is still bleeding.   If the surgical site continues to bleed, then please contact your doctor.      For [facility] users:   You will receive your biopsy results in [facility] as soon as they are available. Please be assured that your physician/provider will review your results and will then determine what further treatment, evaluation, or planning is required. You should be contacted by your physician's/provider's office within 5 business days of receiving your results; If not, please reach out to directly. This is one more way [employer] putting you first.     [facility] [place] [address]/ [phone] FAX/ [email or web address]

## 2024-05-07 LAB
FINAL PATHOLOGIC DIAGNOSIS: NORMAL
GROSS: NORMAL
Lab: NORMAL
MICROSCOPIC EXAM: NORMAL

## 2024-05-09 DIAGNOSIS — D48.5 NEOPLASM OF UNCERTAIN BEHAVIOR OF SKIN: Primary | ICD-10-CM

## 2024-05-10 ENCOUNTER — PATIENT MESSAGE (OUTPATIENT)
Dept: DERMATOLOGY | Facility: CLINIC | Age: 64
End: 2024-05-10
Payer: COMMERCIAL

## 2024-05-13 DIAGNOSIS — I49.1 PREMATURE ATRIAL CONTRACTIONS: ICD-10-CM

## 2024-05-13 DIAGNOSIS — R00.2 PALPITATIONS: ICD-10-CM

## 2024-05-15 RX ORDER — DILTIAZEM HYDROCHLORIDE 180 MG/1
180 CAPSULE, COATED, EXTENDED RELEASE ORAL
Qty: 90 CAPSULE | Refills: 3 | Status: SHIPPED | OUTPATIENT
Start: 2024-05-15

## 2024-08-21 ENCOUNTER — OFFICE VISIT (OUTPATIENT)
Dept: CARDIOLOGY | Facility: CLINIC | Age: 64
End: 2024-08-21
Payer: COMMERCIAL

## 2024-08-21 VITALS
DIASTOLIC BLOOD PRESSURE: 72 MMHG | BODY MASS INDEX: 26.8 KG/M2 | SYSTOLIC BLOOD PRESSURE: 117 MMHG | WEIGHT: 151.25 LBS | HEART RATE: 74 BPM | HEIGHT: 63 IN

## 2024-08-21 DIAGNOSIS — R07.89 CHEST TIGHTNESS: Primary | ICD-10-CM

## 2024-08-21 DIAGNOSIS — I49.1 PREMATURE ATRIAL CONTRACTIONS: ICD-10-CM

## 2024-08-21 DIAGNOSIS — Z71.89 ENCOUNTER FOR CARDIAC RISK COUNSELING: ICD-10-CM

## 2024-08-21 PROCEDURE — 99214 OFFICE O/P EST MOD 30 MIN: CPT | Mod: S$GLB,,,

## 2024-08-21 PROCEDURE — 99999 PR PBB SHADOW E&M-EST. PATIENT-LVL III: CPT | Mod: PBBFAC,,,

## 2024-08-21 NOTE — PROGRESS NOTES
[facility]  [facility]  Date: 8/21/24    Patient: Renate Vang, 1960, [id number]  Primary Care Provider: Sameera Dudley MD     Chief Complaint: Follow up     Subjective:       Renate Vang is a 63 y.o. female who presents for follow up. They follow with Dr. Salazar.    CBC, BMP, TSH stable. HDL 86. .6. At last visit, patient with improved palpitations for which diltiazem was continued. Patient visited Dr. Chinchilla, EP, for further evaluation- he recommended continued medical therapy at this time.    Since then, patient reports persistent, stable palpitations with chest tightness. These episodes are triggered by stressful situations. Notes stressful schedule at home which has disrupted sleep and exercise. Denies dyspnea, dizziness, syncope, orthopnea, PND, edema.    Focused Past History includes:  Premature atrial contractions  72 Hr Holter 5/2023: predominantly sinus rhythm avg HR 75 bpm, 22.75% SVE, 0.16% PVCs  Stress EKG 6/2023: no ischemia  TTE 8/2021: LVEF 65%  Family history of atherosclerotic disease  Father with CVA, atrial fibrillation  Sister with CVA  Maternal grandmother with MI   Paternal grandfather with MI    Current Outpatient Medications   Medication Sig    calcium-vitamin D (OSCAL) 250 (625)-125 mg-unit per tablet Take 1 tablet by mouth once daily.    diltiaZEM (CARDIZEM CD) 180 MG 24 hr capsule TAKE 1 CAPSULE BY MOUTH ONCE DAILY.    estradioL (ESTRACE) 0.01 % (0.1 mg/gram) vaginal cream Place vaginally.    glucosamine-chondroitin 500-400 mg tablet Take 1 tablet by mouth 2 (two) times daily.    Lactobacillus rhamnosus GG (CULTURELLE) 10 billion cell capsule Take 1 capsule by mouth once daily.    magnesium oxide (MAG-OX) 400 mg (241.3 mg magnesium) tablet Take 400 mg by mouth once daily.    RESTASIS 0.05 % ophthalmic emulsion     vit A/vit C/vit E/zinc/copper (ICAPS AREDS ORAL) Take by mouth.    vitamin D (VITAMIN D3) 1000 units Tab Take 600 Units by mouth once daily.      vitamin E 1000 UNIT capsule Take 1,000 Units by mouth once daily. 5000 UNITS    zinc gluconate 50 mg tablet Take 25 mg by mouth once daily.     ALPRAZolam (XANAX) 0.25 MG tablet Take 1 tablet (0.25 mg total) by mouth daily as needed for Anxiety.     No current facility-administered medications for this visit.            Objective       Review of Systems  Constitutional: negative for fevers, night sweats, and weight loss  Eyes: negative for visual disturbance, diplopia  Respiratory: negative for cough, hemoptysis, sputum, and wheezing  Cardiovascular: see HPI  Gastrointestinal: negative for abdominal pain, bright red blood per rectum, change in bowel habits, dysphagia, melena, and reflux symptoms  Genitourinary:negative for dysuria, frequency, and hematuria  Hematologic/lymphatic: negative for bleeding, easy bruising, and lymphadenopathy  Musculoskeletal:negative for arthralgias, back pain, and myalgias  Neurological: negative for gait problems, paresthesia, speech problems, vertigo, and weakness  Behavioral/Psych: negative for excessive alcohol consumption, illegal drug usage, and sleep disturbance    -------------------------------------    Abnormal Pap smear of cervix    Anxiety    Breast cancer    Depression    History of abnormal cervical Pap smear    History of basal cell carcinoma    right axilla    History of breast cancer    at 42 s/p surg and radiation T1N0M0 ER+    History of HPV infection    LOW RISK    History of parathyroidectomy    R lower gland removed due to adenoma    Kidney stones    Osteopenia    last dexa - refuses additional testing     PONV (postoperative nausea and vomiting)    Unspecified macular degeneration    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)     ----------------------------    Augmentation of breast    replaced 2008    Breast biopsy    Breast lumpectomy    x2 Right with senital node bx, with radiation    Breast reconstruction    s/p lumpectomy to replace previous  "implants bilaterally    Breast surgery    breast augmentation prior to lumpectomy    Cataract surgery     section, low transverse    x2    Colonoscopy    Procedure: COLONOSCOPY;  Surgeon: Khanh Forrester Jr., MD;  Location: [facility] ENDO;  Service: Endoscopy;  Laterality: N/A;    Colonoscopy    Procedure: COLONOSCOPY;  Surgeon: Khanh Forrester Jr., MD;  Location: [facility] ENDO;  Service: Endoscopy;  Laterality: N/A;    Colonoscopy w/ polypectomy    HERBIE.   One 1 mm polyp at the hepatic flexure. TUBULAR ADENOMATOUS POLYP.  One 1 mm polyp in the cecum. TUBULAR ADENOMATOUS POLYP.  The examination was otherwise normal.      Hernia repair    bilateral groin     Laser ablation of condylomas    Malignant skin lesion excision    nose & under right arm    Parathyroid gland surgery    R lower gland removed due to adenoma    Tonsillectomy        Family History   Problem Relation Name Age of Onset    Glaucoma Mother      Osteoarthritis Mother      Arrhythmia Mother      Fibromyalgia Mother      Kidney disease Mother      Hypertension Father      Hashimoto's thyroiditis Sister      Anxiety disorder Sister      Cancer Maternal Uncle          colon    Heart disease Maternal Grandmother      COPD Maternal Grandmother      Tuberculosis Maternal Grandmother      Hypertension Maternal Grandmother      COPD Maternal Grandfather      Osteoarthritis Maternal Grandfather      COPD Paternal Grandfather      Breast cancer Cousin paternal 1st         40's    Ovarian cancer Neg Hx       Social History     Tobacco Use    Smoking status: Never    Smokeless tobacco: Never   Substance Use Topics    Alcohol use: Yes     Alcohol/week: 4.0 standard drinks of alcohol     Types: 4 Glasses of wine per week     Comment: 2 drinks three times per week    Drug use: Yes     Types: Benzodiazepines       Physical Exam  /72 (BP Location: Left arm, Patient Position: Sitting, BP Method: Small (Manual))   Pulse 74   Ht 5' 3" (1.6 m)   Wt 68.6 kg (151 " lb 3.8 oz)   LMP 09/20/2006   BMI 26.79 kg/m²   Body surface area is 1.75 meters squared.  Body mass index is 26.79 kg/m².    General appearance: alert, appears stated age, cooperative, and no distress  Head: Normocephalic, without obvious abnormality, atraumatic  Neck: no carotid bruit, no JVD, and supple, symmetrical, trachea midline  Lungs: clear to auscultation bilaterally  Heart: regular rate and rhythm; S1, S2 normal, no murmur, click, rub or gallop  Abdomen: soft, non-tender, no distended  Extremities: extremities atraumatic, no pitting edema  Skin: warm, no cyanosis, no pathologic ecchymosis in exposed portions  Neurologic: Grossly normal. A&O x3      Lab Review   Lab Results   Component Value Date    WBC 5.90 10/13/2023    HGB 13.2 10/13/2023    HCT 39.8 10/13/2023    MCV 91 10/13/2023     10/13/2023         BMP  Lab Results   Component Value Date     10/13/2023    K 4.1 10/13/2023     10/13/2023    CO2 27 10/13/2023    BUN 13 10/13/2023    CREATININE 0.8 10/13/2023    CALCIUM 9.3 10/13/2023    ANIONGAP 9 10/13/2023    ESTGFRAFRICA >60.0 07/07/2022    EGFRNONAA >60.0 07/07/2022       Lab Results   Component Value Date    ALBUMIN 4.0 10/13/2023       Lab Results   Component Value Date    ALT 23 10/13/2023    AST 22 10/13/2023    ALKPHOS 64 10/13/2023    BILITOT 0.7 10/13/2023       Lab Results   Component Value Date    TSH 1.307 10/13/2023       Lab Results   Component Value Date    CHOL 272 (H) 10/13/2023    CHOL 257 (H) 09/23/2022    CHOL 229 (H) 07/07/2022     Lab Results   Component Value Date    HDL 86 (H) 10/13/2023    HDL 75 09/23/2022    HDL 65 07/07/2022     Lab Results   Component Value Date    LDLCALC 169.6 (H) 10/13/2023    LDLCALC 164.6 (H) 09/23/2022    LDLCALC 136.0 07/07/2022     Lab Results   Component Value Date    TRIG 82 10/13/2023    TRIG 87 09/23/2022    TRIG 140 07/07/2022     Lab Results   Component Value Date    CHOLHDL 31.6 10/13/2023    CHOLHDL 29.2 09/23/2022     CHOLHDL 28.4 07/07/2022        The 10-year ASCVD risk score (Omer LONDON, et al., 2019) is: 4.1%    Values used to calculate the score:      Age: 63 years      Sex: Female      Is Non- : No      Diabetic: No      Tobacco smoker: No      Systolic Blood Pressure: 117 mmHg      Is BP treated: No      HDL Cholesterol: 86 mg/dL      Total Cholesterol: 272 mg/dL          Assessment & Plan:     This is a 63 y.o. female with PACs. Reports persistent, stable palpitations with chest tightness for several years. Worse with stressful situations which are occurring more frequently in her life.       1. Chest tightness   - EKG 12/2023 no ST changes  - Exercise stress 6/2023: no ischemia   - Patient requests cardiac CT scan for further evaluation secondary to extensive family cardiac history.     2. Cardiac risk counseling   - Cardiac CT scan in setting of chest tightness and extensive family cardiac history     3. Premature atrial contractions  - Rate controlled today in clinic  - Continue diltiazem  mg qd      Emphasized the importance of modifying lifestyle related risk factors including limiting alcohol intake, exercise, diet most resembling a Mediterranean diet.    Please follow up in 9-12 months or sooner if needed.      Dominique Dolese, PA-C [facility] [place]  Office: [phone]

## 2024-08-23 ENCOUNTER — TELEPHONE (OUTPATIENT)
Dept: CARDIOLOGY | Facility: CLINIC | Age: 64
End: 2024-08-23
Payer: COMMERCIAL

## 2024-08-23 NOTE — TELEPHONE ENCOUNTER
----- Message from Mery Man LPN sent at 8/22/2024  5:07 PM CDT -----  Regarding: FW: advice    ----- Message -----  From: Lion Warren  Sent: 8/22/2024   4:18 PM CDT  To: [facility] Staff  Subject: advice                                           Type: Needs Medical Advice  Who Called:  [facility]   Symptoms (please be specific):    How long has patient had these symptoms:    Pharmacy name and phone #:    Best Call Back Number: [phone] Ext [phone]  Additional Information: She is calling to get the pts CPT code for the pts ct scan. Please call to advise. Thanks

## 2024-08-28 ENCOUNTER — TELEPHONE (OUTPATIENT)
Dept: CARDIOLOGY | Facility: CLINIC | Age: 64
End: 2024-08-28
Payer: COMMERCIAL

## 2024-08-28 ENCOUNTER — PATIENT MESSAGE (OUTPATIENT)
Dept: CARDIOLOGY | Facility: CLINIC | Age: 64
End: 2024-08-28
Payer: COMMERCIAL

## 2024-08-28 NOTE — TELEPHONE ENCOUNTER
----- Message from Taylor Cook sent at 8/28/2024 10:57 AM CDT -----  Contact: Sravani from "[employer]  Type:  Needs Medical Advice    Who Called:   Sravani from "[employer]    Would the patient rather a call back or a response via [facility]?   Call back  Best Call Back Number:    [phone] Ext [phone] - Sravani    Additional Information:   States she would like to speak with someone about coverage information for patient's CT Scan today at 3:30 - please call Sravani - thank you

## 2024-08-29 NOTE — TELEPHONE ENCOUNTER
Since these4 messages pt has been to the ER and strips were reviewed byh the ER physician who explained to the pt they are not she was prescribed Xanax. She said the ER recommended a Holter monitor. She called in to ask if you would review ER visit and give your recommendations

## 2024-09-20 ENCOUNTER — HOSPITAL ENCOUNTER (OUTPATIENT)
Dept: RADIOLOGY | Facility: HOSPITAL | Age: 64
Discharge: HOME | End: 2024-09-20

## 2024-09-20 DIAGNOSIS — Z71.89 ENCOUNTER FOR CARDIAC RISK COUNSELING: ICD-10-CM

## 2024-09-20 DIAGNOSIS — R07.89 CHEST TIGHTNESS: ICD-10-CM

## 2024-09-20 PROCEDURE — 75571 CT HRT W/O DYE W/CA TEST: CPT | Mod: TC,PO

## 2024-09-20 PROCEDURE — 75571 CT HRT W/O DYE W/CA TEST: CPT | Mod: 26,,, | Performed by: STUDENT IN AN ORGANIZED HEALTH CARE EDUCATION/TRAINING PROGRAM

## 2025-01-22 ENCOUNTER — TELEPHONE (OUTPATIENT)
Dept: FAMILY MEDICINE | Facility: CLINIC | Age: 65
End: 2025-01-22
Payer: COMMERCIAL

## 2025-02-06 ENCOUNTER — OFFICE VISIT (OUTPATIENT)
Dept: FAMILY MEDICINE | Facility: CLINIC | Age: 65
End: 2025-02-06
Payer: COMMERCIAL

## 2025-02-06 VITALS
SYSTOLIC BLOOD PRESSURE: 100 MMHG | WEIGHT: 152.69 LBS | DIASTOLIC BLOOD PRESSURE: 70 MMHG | OXYGEN SATURATION: 96 % | RESPIRATION RATE: 18 BRPM | TEMPERATURE: 98 F | HEART RATE: 86 BPM | HEIGHT: 63 IN | BODY MASS INDEX: 27.05 KG/M2

## 2025-02-06 DIAGNOSIS — R10.9 FLANK PAIN: ICD-10-CM

## 2025-02-06 DIAGNOSIS — R20.2 PARESTHESIA AND PAIN OF BOTH UPPER EXTREMITIES: ICD-10-CM

## 2025-02-06 DIAGNOSIS — I49.1 PREMATURE ATRIAL CONTRACTIONS: ICD-10-CM

## 2025-02-06 DIAGNOSIS — M79.602 PARESTHESIA AND PAIN OF BOTH UPPER EXTREMITIES: ICD-10-CM

## 2025-02-06 DIAGNOSIS — M79.601 PARESTHESIA AND PAIN OF BOTH UPPER EXTREMITIES: ICD-10-CM

## 2025-02-06 DIAGNOSIS — R00.2 PALPITATIONS: ICD-10-CM

## 2025-02-06 DIAGNOSIS — F41.9 ANXIETY: ICD-10-CM

## 2025-02-06 DIAGNOSIS — R11.0 NAUSEA: ICD-10-CM

## 2025-02-06 DIAGNOSIS — M54.2 NECK PAIN: ICD-10-CM

## 2025-02-06 DIAGNOSIS — Z12.31 OTHER SCREENING MAMMOGRAM: ICD-10-CM

## 2025-02-06 DIAGNOSIS — Z00.00 ANNUAL PHYSICAL EXAM: Primary | ICD-10-CM

## 2025-02-06 PROCEDURE — 90677 PCV20 VACCINE IM: CPT | Mod: S$GLB,,, | Performed by: FAMILY MEDICINE

## 2025-02-06 PROCEDURE — 90471 IMMUNIZATION ADMIN: CPT | Mod: S$GLB,,, | Performed by: FAMILY MEDICINE

## 2025-02-06 PROCEDURE — 81003 URINALYSIS AUTO W/O SCOPE: CPT | Mod: PO | Performed by: FAMILY MEDICINE

## 2025-02-06 PROCEDURE — 99397 PER PM REEVAL EST PAT 65+ YR: CPT | Mod: 25,S$GLB,, | Performed by: FAMILY MEDICINE

## 2025-02-06 RX ORDER — ALPRAZOLAM 0.25 MG/1
0.25 TABLET ORAL DAILY PRN
Qty: 30 TABLET | Refills: 0 | Status: SHIPPED | OUTPATIENT
Start: 2025-02-06

## 2025-02-06 RX ORDER — ERYTHROMYCIN 5 MG/G
OINTMENT OPHTHALMIC NIGHTLY
Qty: 3.5 G | Refills: 2 | Status: SHIPPED | OUTPATIENT
Start: 2025-02-06

## 2025-02-06 RX ORDER — ONDANSETRON 8 MG/1
TABLET, ORALLY DISINTEGRATING ORAL
COMMUNITY
Start: 2024-09-03

## 2025-02-07 LAB
BILIRUB UR QL STRIP: NEGATIVE
CLARITY UR REFRACT.AUTO: ABNORMAL
COLOR UR AUTO: YELLOW
GLUCOSE UR QL STRIP: NEGATIVE
HGB UR QL STRIP: ABNORMAL
KETONES UR QL STRIP: ABNORMAL
LEUKOCYTE ESTERASE UR QL STRIP: NEGATIVE
NITRITE UR QL STRIP: NEGATIVE
PH UR STRIP: 6 [PH] (ref 5–8)
PROT UR QL STRIP: NEGATIVE
SP GR UR STRIP: 1.02 (ref 1–1.03)
URN SPEC COLLECT METH UR: ABNORMAL

## 2025-02-07 NOTE — PROGRESS NOTES
Subjective:       Patient ID: Renate Vang is a 64 y.o. female.    Chief Complaint: Annual Exam (Pt will like to discuss the 2 bumps that has developed on the left eye )    History of Present Illness    CHIEF COMPLAINT:  Patient presents today for her annual exam.  She does have a few issues to discuss with me as well.  Overall, she is doing well.  She is said intermediate date of March 31, 2026.  She does stay active but has not been doing her yoga as much as she was previously.  She is trying to consume a healthy diet.  She is due for labs and mammogram and would be willing to get those scheduled.  She is due for her Prevnar vaccine and would be willing to have that done.  Her colonoscopy is up-to-date.    MUSCULOSKELETAL:  She reports increased neck pain and stiffness with tingling and numbness in both arms extending to hands, which has significantly worsened since discontinuing yoga. Symptoms are positional, triggered by certain movements, and improve with stretching exercises.   She also experiences left-sided back pain exacerbated by prolonged standing and lower back pain since stopping yoga. She denies numbness or tingling in legs.    OCULAR:  She developed two sties in her eyes, with the second one appearing yesterday. The condition feels improved today. She has been using lubricating eye drops and applying a hot compress with a rice bag for relief. The sties were previously very uncomfortable and itchy.    GASTROINTESTINAL:  She experiences intermittent morning nausea on random days that resolves without lasting throughout the day. No specific food triggers or activities have been identified. She occasionally experiences mild, transient abdominal discomfort but denies significant abdominal pain.    CARDIOVASCULAR:  She experienced an episode of racing heart in August while helping someone move, associated with dehydration. Her personal device indicated possible atrial fibrillation, which was ruled out upon  medical evaluation.  She does have PVCs and is currently taking Cardizem under the direction of Cardiology and EP.  With the Cardizem, she does not have palpitations    PAST MEDICAL HISTORY:  History of breast cancer and recent spindle cell skin cancer.  Of note, testing was negative for breast cancer genes.    ANXIETY:  She does take Xanax rarely as needed, obtained during last emergency room visit.  Her last prescription was in August of 2024        Review of Systems   Constitutional:  Negative for appetite change, chills, diaphoresis, fatigue, fever and unexpected weight change.   HENT:  Negative for congestion, dental problem, ear pain, hearing loss, postnasal drip, rhinorrhea, sinus pressure, sneezing, sore throat and trouble swallowing.    Eyes:  Negative for photophobia, pain, discharge and visual disturbance.        Per HPI   Respiratory:  Negative for cough, chest tightness, shortness of breath and wheezing.    Cardiovascular:  Negative for chest pain, palpitations and leg swelling.   Gastrointestinal:  Positive for nausea. Negative for abdominal distention, abdominal pain, blood in stool, constipation, diarrhea and vomiting.   Endocrine: Negative for cold intolerance, heat intolerance, polydipsia and polyuria.   Genitourinary:  Negative for dysuria, flank pain, frequency, genital sores, hematuria, menstrual problem and vaginal discharge.   Musculoskeletal:  Positive for back pain and neck pain. Negative for arthralgias, joint swelling and myalgias.        Per HPI   Skin:  Negative for rash.   Neurological:  Negative for dizziness, syncope, light-headedness and headaches.   Hematological:  Negative for adenopathy. Does not bruise/bleed easily.   Psychiatric/Behavioral:  Negative for dysphoric mood, self-injury, sleep disturbance and suicidal ideas. The patient is not nervous/anxious.          Objective:      Physical Exam  Constitutional:       General: She is not in acute distress.     Appearance: Normal  appearance. She is well-developed.   HENT:      Head: Normocephalic and atraumatic.      Right Ear: Hearing, tympanic membrane, ear canal and external ear normal.      Left Ear: Hearing, tympanic membrane, ear canal and external ear normal.      Nose: Nose normal.      Mouth/Throat:      Mouth: No oral lesions.      Pharynx: No oropharyngeal exudate or posterior oropharyngeal erythema.   Eyes:      General: Lids are normal. No scleral icterus.        Left eye: Hordeolum (x 2 upper lid) present.     Extraocular Movements: Extraocular movements intact.      Conjunctiva/sclera: Conjunctivae normal.      Pupils: Pupils are equal, round, and reactive to light.   Neck:      Thyroid: No thyroid mass or thyromegaly.      Vascular: No carotid bruit.   Cardiovascular:      Rate and Rhythm: Normal rate and regular rhythm. No extrasystoles are present.     Chest Wall: PMI is not displaced.      Heart sounds: Normal heart sounds. No murmur heard.     No gallop.   Pulmonary:      Effort: Pulmonary effort is normal. No accessory muscle usage or respiratory distress.      Breath sounds: Normal breath sounds.   Abdominal:      General: Bowel sounds are normal. There is no abdominal bruit.      Palpations: Abdomen is soft.      Tenderness: There is no abdominal tenderness. There is no rebound.   Musculoskeletal:      Cervical back: Normal range of motion and neck supple.   Lymphadenopathy:      Head:      Right side of head: No submental or submandibular adenopathy.      Left side of head: No submental or submandibular adenopathy.      Cervical:      Right cervical: No superficial, deep or posterior cervical adenopathy.     Left cervical: No superficial, deep or posterior cervical adenopathy.      Upper Body:      Right upper body: No supraclavicular adenopathy.      Left upper body: No supraclavicular adenopathy.   Skin:     General: Skin is warm and dry.   Neurological:      Mental Status: She is alert and oriented to person,  "place, and time.      Cranial Nerves: No cranial nerve deficit.      Sensory: No sensory deficit.   Psychiatric:         Speech: Speech normal.         Behavior: Behavior normal.         Thought Content: Thought content normal.       Blood pressure 100/70, pulse 86, temperature 98.1 °F (36.7 °C), temperature source Temporal, resp. rate 18, height 5' 3" (1.6 m), weight 69.3 kg (152 lb 10.7 oz), last menstrual period 09/20/2006, SpO2 96%.Body mass index is 27.04 kg/m².            Assessment & Plan    1. Evaluated neck pain and numbness in arms, recommended x-ray imaging due to frequency of symptoms  2. Assessed eye stye, prescribed ointment for nighttime use  3. Ordered abdominal ultrasound to investigate occasional nausea and flank pain  4. Reviewed cardiac history, continued current management with diltiazem  5. Considered stress management, refilled Xanax for occasional use    PVCs:  - Continued diltiazem at current dose and provided refill with extended duration.  - follow-up with Dr. Walton or Dr. Kirkland for cardiac management as planned    BREAST CANCER HISTORY:  - Ordered a mammogram for ongoing surveillance.  - Confirmed that genetic testing was negative for breast cancer genes.  - Instructed the patient to follow up and schedule mammogram at [facility].    HISTORY OF SKIN CANCER WITH RECENT SPINDLE CELL CANCER:  - Recommend continued dermatology follow-up.    NECK PAIN WITH UPPER EXTREMITY PARESTHESIAS:  - Ordered an x-ray of the neck.  - Noted that the patient reports experiencing more aches and pains, particularly tightness in the neck, since stopping yoga.  - Noted that the patient reports intermittent tingling and numbness in arms, sometimes extending all the way down, which has increased since stopping yoga.    BACK PAIN:  - Noted that the patient reports experiencing lower back pain since stopping yoga practice.  - noted prior history of scoliosis, degenerative disc disease and nephrolithiasis  - " we will check UA to look for microscopic hematuria    NAUSEA:  - Ordered an ultrasound and urine test to investigate the cause of nausea.  - Noted that the patient reports occasional morning nausea that passes quickly, with no identifiable cause.    ANXIETY:  - Refilled Xanax prescription for occasional use in anxiety management.  She understands that this is a benzodiazepine with the potential for addiction and tolerance.  The patient also understands that all benzos impair reflexes and cognition and so the patient should not drive, operate heavy machinery or make significant decisions while taking the benzodiazepine.  The patient should also not take the benzodiazepines with alcohol  - Noted that the patient reports improved stress management through prayer and adjusting expectations.    EYE STYE:  - Instructed the patient to continue warm compresses and massage for eye sty.  - Prescribed erythromycin ointment for nighttime use on stye.  - if no better or worse, she will let us know    ANNUAL EXAM:  - health maintenance issues and anticipatory guidance issues were discussed  - she will continue stay physically active consume healthy diet  - we will schedule labs and mammogram soon  - we will administer Prevnar today          This note was generated with the assistance of ambient listening technology. Verbal consent was obtained by the patient and accompanying visitor(s) for the recording of patient appointment to facilitate this note. I attest to having reviewed and edited the generated note for accuracy, though some syntax or spelling errors may persist. Please contact the author of this note for any clarification.

## 2025-02-17 ENCOUNTER — HOSPITAL ENCOUNTER (OUTPATIENT)
Dept: RADIOLOGY | Facility: HOSPITAL | Age: 65
Discharge: HOME | End: 2025-02-17
Attending: FAMILY MEDICINE
Payer: COMMERCIAL

## 2025-02-17 DIAGNOSIS — M54.2 NECK PAIN: ICD-10-CM

## 2025-02-17 DIAGNOSIS — M79.601 PARESTHESIA AND PAIN OF BOTH UPPER EXTREMITIES: ICD-10-CM

## 2025-02-17 DIAGNOSIS — R20.2 PARESTHESIA AND PAIN OF BOTH UPPER EXTREMITIES: ICD-10-CM

## 2025-02-17 DIAGNOSIS — R10.9 FLANK PAIN: ICD-10-CM

## 2025-02-17 DIAGNOSIS — R11.0 NAUSEA: ICD-10-CM

## 2025-02-17 DIAGNOSIS — M79.602 PARESTHESIA AND PAIN OF BOTH UPPER EXTREMITIES: ICD-10-CM

## 2025-02-17 PROCEDURE — 76700 US EXAM ABDOM COMPLETE: CPT | Mod: TC,PO

## 2025-02-17 PROCEDURE — 72040 X-RAY EXAM NECK SPINE 2-3 VW: CPT | Mod: TC,FY,PO

## 2025-02-17 PROCEDURE — 76700 US EXAM ABDOM COMPLETE: CPT | Mod: 26,,, | Performed by: RADIOLOGY

## 2025-02-20 ENCOUNTER — PATIENT MESSAGE (OUTPATIENT)
Dept: FAMILY MEDICINE | Facility: CLINIC | Age: 65
End: 2025-02-20
Payer: COMMERCIAL

## 2025-02-21 ENCOUNTER — HOSPITAL ENCOUNTER (OUTPATIENT)
Dept: RADIOLOGY | Facility: HOSPITAL | Age: 65
Discharge: HOME | End: 2025-02-21
Attending: FAMILY MEDICINE
Payer: COMMERCIAL

## 2025-02-21 ENCOUNTER — RESULTS FOLLOW-UP (OUTPATIENT)
Dept: FAMILY MEDICINE | Facility: CLINIC | Age: 65
End: 2025-02-21

## 2025-02-21 DIAGNOSIS — Z12.31 OTHER SCREENING MAMMOGRAM: ICD-10-CM

## 2025-02-21 PROCEDURE — 77063 BREAST TOMOSYNTHESIS BI: CPT | Mod: TC,PO

## 2025-02-21 PROCEDURE — 77067 SCR MAMMO BI INCL CAD: CPT | Mod: 26,,, | Performed by: RADIOLOGY

## 2025-02-21 PROCEDURE — 77063 BREAST TOMOSYNTHESIS BI: CPT | Mod: 26,,, | Performed by: RADIOLOGY

## 2025-02-22 ENCOUNTER — PATIENT MESSAGE (OUTPATIENT)
Dept: FAMILY MEDICINE | Facility: CLINIC | Age: 65
End: 2025-02-22
Payer: COMMERCIAL

## 2025-02-22 DIAGNOSIS — M54.50 LOW BACK PAIN, UNSPECIFIED BACK PAIN LATERALITY, UNSPECIFIED CHRONICITY, UNSPECIFIED WHETHER SCIATICA PRESENT: ICD-10-CM

## 2025-02-22 DIAGNOSIS — R31.29 MICROSCOPIC HEMATURIA: ICD-10-CM

## 2025-02-22 DIAGNOSIS — M53.3 COCCYDYNIA: ICD-10-CM

## 2025-02-22 DIAGNOSIS — M54.9 BACK PAIN, UNSPECIFIED BACK LOCATION, UNSPECIFIED BACK PAIN LATERALITY, UNSPECIFIED CHRONICITY: ICD-10-CM

## 2025-02-22 DIAGNOSIS — M54.2 CERVICALGIA: Primary | ICD-10-CM

## 2025-02-22 DIAGNOSIS — R11.0 NAUSEA: Primary | ICD-10-CM

## 2025-03-06 ENCOUNTER — TELEPHONE (OUTPATIENT)
Dept: FAMILY MEDICINE | Facility: CLINIC | Age: 65
End: 2025-03-06
Payer: COMMERCIAL

## 2025-03-06 NOTE — TELEPHONE ENCOUNTER
Fax received from pt's insurance stating that the [place] 8[address] - has been denied.  It states that a [id number] will be approved if it is requested.  Please advise. It is just abdomen, not pelvis.

## 2025-03-07 NOTE — TELEPHONE ENCOUNTER
Spoke to pt and advised that insurance would not cover the current order.  Awaiting new order from provider.

## 2025-03-07 NOTE — TELEPHONE ENCOUNTER
----- Message from Taylor sent at 3/7/2025  1:44 PM CST -----  Contact: Patient  Type:  Patient Returning CallWho Called:  PatientWho Left Message for Patient:  Nisreen the patient know what this is regarding?:  insuranceWould the patient rather a call back or a response via [facility]?   Call Erica Call Back Number:  [phone] Information:  States she is replying to message in portal - please call - thank you

## 2025-03-11 ENCOUNTER — HOSPITAL ENCOUNTER (OUTPATIENT)
Dept: RADIOLOGY | Facility: HOSPITAL | Age: 65
Discharge: HOME | End: 2025-03-11
Attending: FAMILY MEDICINE
Payer: COMMERCIAL

## 2025-03-11 DIAGNOSIS — M54.50 LOW BACK PAIN, UNSPECIFIED BACK PAIN LATERALITY, UNSPECIFIED CHRONICITY, UNSPECIFIED WHETHER SCIATICA PRESENT: ICD-10-CM

## 2025-03-11 DIAGNOSIS — M53.3 COCCYDYNIA: ICD-10-CM

## 2025-03-11 DIAGNOSIS — M54.2 CERVICALGIA: ICD-10-CM

## 2025-03-11 PROCEDURE — 72141 MRI NECK SPINE W/O DYE: CPT | Mod: 26,,, | Performed by: RADIOLOGY

## 2025-03-11 PROCEDURE — 72100 X-RAY EXAM L-S SPINE 2/3 VWS: CPT | Mod: 26,,, | Performed by: RADIOLOGY

## 2025-03-11 PROCEDURE — 72220 X-RAY EXAM SACRUM TAILBONE: CPT | Mod: TC,FY,PO

## 2025-03-11 PROCEDURE — 72100 X-RAY EXAM L-S SPINE 2/3 VWS: CPT | Mod: TC,FY,PO

## 2025-03-11 PROCEDURE — 72141 MRI NECK SPINE W/O DYE: CPT | Mod: TC,PO

## 2025-03-11 PROCEDURE — 72220 X-RAY EXAM SACRUM TAILBONE: CPT | Mod: 26,,, | Performed by: RADIOLOGY

## 2025-03-15 ENCOUNTER — PATIENT MESSAGE (OUTPATIENT)
Dept: FAMILY MEDICINE | Facility: CLINIC | Age: 65
End: 2025-03-15
Payer: COMMERCIAL

## 2025-03-15 DIAGNOSIS — M48.02 FORAMINAL STENOSIS OF CERVICAL REGION: Primary | ICD-10-CM

## 2025-03-19 ENCOUNTER — OFFICE VISIT (OUTPATIENT)
Dept: PAIN MEDICINE | Facility: CLINIC | Age: 65
End: 2025-03-19
Payer: COMMERCIAL

## 2025-03-19 VITALS
SYSTOLIC BLOOD PRESSURE: 120 MMHG | HEART RATE: 76 BPM | WEIGHT: 155.75 LBS | BODY MASS INDEX: 27.59 KG/M2 | DIASTOLIC BLOOD PRESSURE: 65 MMHG

## 2025-03-19 DIAGNOSIS — M54.50 LUMBAR BACK PAIN: ICD-10-CM

## 2025-03-19 DIAGNOSIS — M54.2 CERVICALGIA: Primary | ICD-10-CM

## 2025-03-19 DIAGNOSIS — M41.9 SCOLIOSIS, UNSPECIFIED SCOLIOSIS TYPE, UNSPECIFIED SPINAL REGION: ICD-10-CM

## 2025-03-19 DIAGNOSIS — M47.812 CERVICAL SPONDYLOSIS: ICD-10-CM

## 2025-03-19 DIAGNOSIS — M48.02 FORAMINAL STENOSIS OF CERVICAL REGION: ICD-10-CM

## 2025-03-19 PROCEDURE — 99999 PR PBB SHADOW E&M-EST. PATIENT-LVL IV: CPT | Mod: PBBFAC,,, | Performed by: PHYSICIAN ASSISTANT

## 2025-03-19 PROCEDURE — 99204 OFFICE O/P NEW MOD 45 MIN: CPT | Mod: S$GLB,,, | Performed by: PHYSICIAN ASSISTANT

## 2025-03-19 RX ORDER — CYCLOBENZAPRINE HCL 10 MG
10 TABLET ORAL NIGHTLY PRN
Qty: 30 TABLET | Refills: 0 | Status: SHIPPED | OUTPATIENT
Start: 2025-03-19

## 2025-03-19 NOTE — PROGRESS NOTES
[facility] New Patient Evaluation      Referred by: Dr. Sameera Dudley    PCP:  Sameera Shoemaker    CC:   Chief Complaint   Patient presents with    Back Pain    Neck Pain          HPI:   Renate Vang is a 64 y.o. 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 She presents for neck and back pain; referred by Dr. Dudley.    She has chronic inermittent neck stiffness and intermittent tingling in the arms.  She also has chronic intermittent left thoracolumbar back pain through the years.  Back pain is described as a tight muscle spasm and I present more after standing long.  At times she feels some radicular leg pain, but it resolves very quickly with stretching.  She takes tylenol and tramadol.  He has done home yoga in the past with good control of pain and has more recently stopped.      Denies bowel/ bladder incontinence.    Past and current medications:  Antineuropathics:  NSAIDs:  Antidepressants:  Muscle relaxers:  Opioids: tramadol  Antiplatelets/Anticoagulants:  Others:  tylenol    Physical Therapy/ Chiropractic care:  Home exercise/ yoga    Pain Intervention History:  none    Past Spine Surgical History:  none        History:  Current Medications[1]    Past Medical History:   Diagnosis Date    Abnormal Pap smear of cervix     Breast cancer     right breast    Cervical spinal stenosis     with mild spinal and mild - severe foraminal stenosis 3/25    Depression     History of abnormal cervical Pap smear     History of basal cell carcinoma 03/2011    right  axilla    History of breast cancer 2003    at 42 s/p surg and radiation [id number] ER+    History of cardiovascular stress test     normal     History of HPV infection     LOW RISK    History of parathyroidectomy     R lower gland removed due to adenoma    Kidney stones     Osteopenia     last dexa - refuses additional testing     PONV (postoperative nausea and vomiting)     Unspecified macular degeneration     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)        Past Surgical History:   Procedure Laterality Date    AUGMENTATION OF BREAST      replaced     BREAST BIOPSY      BREAST LUMPECTOMY  2003    x2 Right with senital node bx, with radiation    BREAST RECONSTRUCTION      s/p lumpectomy to replace previous implants bilaterally    BREAST SURGERY      breast augmentation prior to lumpectomy    cataract surgery       SECTION, LOW TRANSVERSE      x2    COLONOSCOPY N/A 2018    Procedure: COLONOSCOPY;  Surgeon: Khanh Forrester Jr., MD;  Location: [facility] ENDO;  Service: Endoscopy;  Laterality: N/A;    COLONOSCOPY N/A 3/8/2024    Procedure: COLONOSCOPY;  Surgeon: Khanh Forrester Jr., MD;  Location: [facility] ENDO;  Service: Endoscopy;  Laterality: N/A;    COLONOSCOPY W/ POLYPECTOMY  2013    HERBIE.   One 1 mm polyp at the hepatic flexure. TUBULAR ADENOMATOUS POLYP.  One 1 mm polyp in the cecum. TUBULAR ADENOMATOUS POLYP.  The examination was otherwise normal.      HERNIA REPAIR      bilateral groin     LASER ABLATION OF CONDYLOMAS      MALIGNANT SKIN LESION EXCISION      nose & under right arm    PARATHYROID GLAND SURGERY      R lower gland removed due to adenoma    TONSILLECTOMY         Family History   Problem Relation Name Age of Onset    Glaucoma Mother      Osteoarthritis Mother      Arrhythmia Mother      Fibromyalgia Mother      Kidney disease Mother      Hypertension Father      Hashimoto's thyroiditis Sister      Anxiety disorder Sister      Cancer Maternal Uncle          colon     Heart disease Maternal Grandmother      COPD Maternal Grandmother      Tuberculosis Maternal Grandmother      Hypertension Maternal Grandmother      COPD Maternal Grandfather      Osteoarthritis Maternal Grandfather      COPD Paternal Grandfather      Breast cancer Cousin paternal 1st         40's    Ovarian cancer Neg Hx         Social History     Socioeconomic History    Marital status:    Occupational History     Employer: [employer]   Tobacco Use    Smoking status: Never    Smokeless tobacco: Never   Substance and Sexual Activity    Alcohol use: Yes     Alcohol/week: 4.0 standard drinks of alcohol     Types: 4 Glasses of wine per week     Comment: 2 drinks three times per week    Drug use: Yes     Types: Benzodiazepines    Sexual activity: Yes     Partners: Male     Birth control/protection: Post-menopausal   Other Topics Concern    Are you pregnant or think you may be? No    Breast-feeding No     Social Drivers of Health     Financial Resource Strain: Low Risk  (4/25/2024)    Overall Financial Resource Strain (CARDIA)     Difficulty of Paying Living Expenses: Not hard at all   Food Insecurity: No Food Insecurity (4/25/2024)    Hunger Vital Sign     Worried About Running Out of Food in the Last Year: Never true     Ran Out of Food in the Last Year: Never true   Transportation Needs: No Transportation Needs (4/25/2024)    PRAPARE - Transportation     Lack of Transportation (Medical): No     Lack of Transportation (Non-Medical): No   Physical Activity: Insufficiently Active (4/25/2024)    Exercise Vital Sign     Days of Exercise per Week: 2 days     Minutes of Exercise per Session: 40 min   Stress: No Stress Concern Present (4/25/2024)    [employer] [place] - Occupational Stress Questionnaire     Feeling of Stress : Only a little   Housing Stability: Low Risk  (10/11/2023)    Housing Stability Vital Sign     Unable to Pay for Housing in the Last Year: No     Number of  Places Lived in the Last Year: 1     Unstable Housing in the Last Year: No       Review of patient's allergies indicates:   Allergen Reactions    Aminoethyl propanol Other (See Comments)    Propranolol Other (See Comments)     Memory     Wellbutrin [bupropion hcl] Other (See Comments)     Memory loss     Adhesive Rash     Blisters with band-aids and surgical dressing       Labs:  Lab Results   Component Value Date    HGBA1C 5.0 02/17/2025       Lab Results   Component Value Date    WBC 6.56 02/17/2025    HGB 14.1 02/17/2025    HCT 43.0 02/17/2025    MCV 90 02/17/2025     02/17/2025           Review of Systems:  Neck pain.  Lower back pain.  Leg pain.  Balance of review of systems is negative.    Physical Exam:  Vitals:    03/19/25 0920   BP: 120/65   Pulse: 76   Weight: 70.7 kg (155 lb 12.1 oz)   PainSc: 0-No pain   PainLoc: Back     Body mass index is 27.59 kg/m².    Pain disability index:      3/19/2025     9:18 AM   Last 3 PDI Scores   Pain Disability Index (PDI) 33       Gen: NAD  Psych: mood appropriate for given condition  HEENT: eyes anicteric   CV: RRR, 2+ radial pulse  Respiratory: non-labored, no signs of respiratory distress  Abd: non-distended  Skin: warm, dry and intact.  Gait: Able to heel walk, toe walk. No antalgic gait.     Cervical spine: ROM is full in flexion, extension and lateral rotation without increased pain.  Spurling's maneuver causes no neck pain to either side.  Myofascial exam: No Tenderness to palpation across cervical paraspinous region bilaterally.    Lumbar spine:  Lumbar spine: ROM is full with flexion extension and oblique extension with no increased pain.    Cody's test causes no increased pain on either side.    Supine straight leg raise is negative bilaterally.    Internal and external rotation of the hip causes no increased pain on either side.  Myofascial exam: No tenderness to palpation across lumbar paraspinous muscles. No tenderness to palpation over the  bilateral greater trochanters and bilateral SI joint    Sensory:  Intact and symmetrical to light touch in C4-T1 dermatomes bilaterally. Intact and symmetrical to light touch in L1-S1 dermatomes bilaterally.    Motor:    Right Left   C4 Shoulder Abduction  5  5   C5 Elbow Flexion    5  5   C6 Wrist Extension  5  5   C7 Elbow Extension   5  5   C8/T1 Hand Intrinsics   5  5        Right Left   L2/3 Iliacus Hip flexion  5  5   L3/4 Qudratus Femoris Knee Extension  5  5   L4/5 Tib Anterior Ankle Dorsiflexion   5  5   L5/S1 Extensor Hallicus Longus Great toe extension  5  5   S1/S2 Gastroc/Soleus Plantar Flexion  5  5      Right Left   Triceps DTR 2+ 2+   Biceps DTR 2+ 2+   Brachioradialis DTR 2+ 2+   Patellar DTR 2+ 2+   Achilles DTR 2+ 2+   Emery Absent  Absent   Clonus Absent Absent   Babinski Absent Absent       Imaging:    MRI cervical spine 3-11-25;  FINDINGS:  Alignment: Stable slight reversal of the normal cervical lordosis with 2 mm anterolisthesis of C3 on C4 and minimal retrolisthesis of C5 on C6.     Vertebral Column: Vertebral body heights are maintained. No acute fracture is identified; however, if trauma is suspected, a CT scan would be a more sensitive examination for fractures. No evidence of an aggressive marrow replacement process. Multilevel disc degeneration, most pronounced at C4-5 and C5-6 with moderate to severe disc space narrowing, disc space narrowing, mixed degenerative endplate changes and posterior disc osteophyte complexes.  Mild Modic type 1 endplate changes noted at C4-5.  Mild reactive marrow edema within the left posterosuperior endplate of T3.     Cord: Normal signal and morphology.     Skull base and craniocervical junction: Normal.     Disc levels:     C2-C3: Minimal posterior disc osteophyte complex.  Mild left facet arthropathy.  Mild left neural foraminal stenosis.  No spinal canal stenosis.     C3-C4: Minimal posterior disc osteophyte complex.  Mild bilateral facet arthropathy.   Mild bilateral uncovertebral joint spurring.  Mild bilateral mild left neural foraminal stenosis.  No significant spinal canal stenosis.     C4-C5: Posterior disc osteophyte complex, asymmetric to the left, which abuts the ventral cord surface without significant mass effect.  Mild bilateral facet arthropathy.  Marked left and moderate right uncovertebral joint spurring.  Severe left and moderate right neural foraminal stenosis.  No significant overall spinal canal stenosis.     C5-C6: Posterior disc osteophyte complex, which partially effaces the ventral thecal sac.  Mild bilateral facet arthropathy.  Marked bilateral uncovertebral joint spurring.  Severe right and moderate left neural foraminal stenosis.  Ligamentum flavum thickening.  Mild spinal canal stenosis.     C6-C7: Mild disc space narrowing.  Mild posterior disc osteophyte complex.  Mild bilateral facet arthropathy.  Marked right and mild left uncovertebral joint spurring.  Moderate to severe right and mild left neural foraminal stenosis.  Incidental bilateral perineural nerve root sleeve cysts.  No significant spinal canal stenosis.     C7-T1: Mild posterior disc osteophyte complex.  Mild bilateral facet arthropathy.  Mild right neural foraminal stenosis.  No spinal canal stenosis.  Incidental bilateral perineural nerve root sleeve cysts.  No significant neural foraminal or spinal canal stenosis.     Paraspinal muscles & soft tissues: No significant abnormality.     Normal signal voids are present in the vertebral arteries.     Impression:     Multilevel degenerative changes of the cervical spine, as detailed above, most pronounced at C5-6 and resulting in severe right and moderate left neural foraminal narrowing and mild spinal canal stenosis.  Severe neural foraminal stenosis also noted on the left at C4-5.  Moderate to severe right neural foraminal stenosis at C6-7.       Xray lumbar spine 3-11-25:  FINDINGS:  There is no recent study for comparison.   There is a broad rotary levocurvature of the lumbar spine which has progressed since the prior studies.  Vertebral body height is preserved.  There is no fracture.  Alignment is grossly normal.  There is lower lumbar facet arthropathy.  There is mild disc space narrowing at the L3-4 level.  Impression:  1. As above      Xray sacrum 3-11-25:  FINDINGS:  There is no radiographically evident acute, displaced sacrococcygeal fracture noted.  There is a grade I anterolisthesis of L4 on L5.  There is degenerative facet arthropathy present at L4-L5 and L5-S1.  The sacroiliac joints are unremarkable.  No hip joint space narrowing.  No osteonecrosis of the femoral heads.  There is a large volume of stool present in the colon and rectum.         Assessment:   Renate Vang is a 64 y.o. 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 She presents in referral from Dr. Sameera Dudley for neck and back pain.    MRI cervical spine shows cervical spondylosis with foraminal narrowing at C4/5, C5/6, C6/7 areas the greatest.  No cord compression.  Lumbar and sacral xrays show thoracolumbar levoscoliosis and degenerative changes.    Neck pain with bilateral arm tingling likely C5, C6, C7 nerve irritation from degenerative changes.  Thoracolumbar back pain - myofascial and/ or referred from facet mediated degenerative changes.    Plan:  - to help with pain in the neck and back we discussed the roles of medications, PT, home exercise, interventional procedures and surgrical consult.  - she would  like to continue with home exercise and try to start yoga back  - will try flexeril to help with pain/ spasms as well.    Problem List Items Addressed This Visit    None  Visit Diagnoses         Cervicalgia    -  Primary    Relevant Medications    cyclobenzaprine (FLEXERIL) 10 MG tablet      Foraminal stenosis of cervical region          Cervical spondylosis          Scoliosis, unspecified scoliosis type, unspecified spinal region        Relevant Medications    cyclobenzaprine (FLEXERIL) 10 MG tablet      Lumbar back pain        Relevant Medications    cyclobenzaprine (FLEXERIL) 10 MG tablet            Follow Up: RTC ias needed if no improvement    : Reviewed and consistent with medication use as prescribed.    Thank you for referring this interesting patient, and I look forward to continuing to collaborate in her care.        Hanna Gabriel PA-C  [facility]         [1]   Current Outpatient Medications:     ALPRAZolam (XANAX) 0.25 MG tablet, Take 1 tablet (0.25 mg total) by mouth daily as needed for Anxiety., Disp: 30 tablet, Rfl: 0    calcium-vitamin D (OSCAL) 250 (625)-125 mg-unit per tablet, Take 1 tablet by mouth once daily., Disp: , Rfl:     diltiaZEM (CARDIZEM CD) 180 MG 24 hr capsule, TAKE 1 CAPSULE BY MOUTH ONCE DAILY., Disp: 90 capsule, Rfl: 3    erythromycin (ROMYCIN) ophthalmic ointment, Place into the left eye every evening., Disp: 3.5 g, Rfl: 2    estradioL (ESTRACE) 0.01 % (0.1 mg/gram) vaginal cream, Place vaginally., Disp: , Rfl:     glucosamine-chondroitin 500-400 mg tablet, Take 1 tablet by mouth 2 (two) times daily., Disp: , Rfl:     Lactobacillus rhamnosus GG (CULTURELLE) 10 billion cell capsule, Take 1 capsule by mouth once daily., Disp: , Rfl:     magnesium oxide (MAG-OX) 400 mg (241.3 mg magnesium) tablet, Take 400 mg by mouth once daily., Disp: , Rfl:     ondansetron (ZOFRAN-ODT) 8 MG TbDL, TAKE 1 TABLET (8 MG TOTAL) BY MOUTH EVERY 8 (EIGHT) HOURS AS NEEDED (NAUSEA  VOMITTING, Disp: , Rfl:     RESTASIS 0.05 % ophthalmic emulsion, , Disp: , Rfl:     vit A/vit C/vit E/zinc/copper (ICAPS AREDS ORAL), Take by mouth., Disp: , Rfl:     vitamin D (VITAMIN D3) 1000 units Tab, Take 600 Units by mouth once daily. , Disp: , Rfl:     vitamin E 1000 UNIT capsule, Take 1,000 Units by mouth once daily. 5000 UNITS, Disp: , Rfl:     zinc gluconate 50 mg tablet, Take 25 mg by mouth once daily. , Disp: , Rfl:     cyclobenzaprine (FLEXERIL) 10 MG tablet, Take 1 tablet (10 mg total) by mouth nightly as needed for Muscle spasms., Disp: 30 tablet, Rfl: 0

## 2025-03-26 DIAGNOSIS — R11.2 NAUSEA WITH VOMITING: Primary | ICD-10-CM

## 2025-03-28 ENCOUNTER — HOSPITAL ENCOUNTER (OUTPATIENT)
Dept: RADIOLOGY | Facility: HOSPITAL | Age: 65
Discharge: HOME | End: 2025-03-28
Attending: FAMILY MEDICINE
Payer: COMMERCIAL

## 2025-03-28 DIAGNOSIS — R11.2 NAUSEA AND VOMITING, UNSPECIFIED VOMITING TYPE: ICD-10-CM

## 2025-03-28 PROCEDURE — 25500020 PHARM REV CODE 255: Mod: PO | Performed by: FAMILY MEDICINE

## 2025-03-28 PROCEDURE — 74177 CT ABD & PELVIS W/CONTRAST: CPT | Mod: 26,,, | Performed by: RADIOLOGY

## 2025-03-28 PROCEDURE — A9698 NON-RAD CONTRAST MATERIALNOC: HCPCS | Mod: PO | Performed by: FAMILY MEDICINE

## 2025-03-28 PROCEDURE — 74177 CT ABD & PELVIS W/CONTRAST: CPT | Mod: TC,PO

## 2025-03-28 RX ADMIN — IOHEXOL 75 ML: 350 INJECTION, SOLUTION INTRAVENOUS at 11:03

## 2025-03-28 RX ADMIN — IOHEXOL 1000 ML: 9 SOLUTION ORAL at 11:03

## 2025-03-30 ENCOUNTER — PATIENT MESSAGE (OUTPATIENT)
Dept: FAMILY MEDICINE | Facility: CLINIC | Age: 65
End: 2025-03-30
Payer: COMMERCIAL

## 2025-04-03 DIAGNOSIS — I49.1 PREMATURE ATRIAL CONTRACTIONS: ICD-10-CM

## 2025-04-03 DIAGNOSIS — R00.2 PALPITATIONS: ICD-10-CM

## 2025-04-04 RX ORDER — DILTIAZEM HYDROCHLORIDE 180 MG/1
180 CAPSULE, COATED, EXTENDED RELEASE ORAL
Qty: 120 CAPSULE | Refills: 2 | Status: SHIPPED | OUTPATIENT
Start: 2025-04-04

## 2025-04-11 NOTE — TELEPHONE ENCOUNTER
Attempted to return call to the patient, left message, clinic number provided.   Patient had outpatient labs revealing a hemoglobin of 7  Baseline hemoglobin 9-10  Giving 1 unit PRBC in the ED on 4/10

## 2025-05-20 DIAGNOSIS — M54.50 LUMBAR BACK PAIN: ICD-10-CM

## 2025-05-20 DIAGNOSIS — M54.2 CERVICALGIA: ICD-10-CM

## 2025-05-20 DIAGNOSIS — M41.9 SCOLIOSIS, UNSPECIFIED SCOLIOSIS TYPE, UNSPECIFIED SPINAL REGION: ICD-10-CM

## 2025-05-21 RX ORDER — CYCLOBENZAPRINE HCL 10 MG
TABLET ORAL
Qty: 30 TABLET | Refills: 0 | Status: SHIPPED | OUTPATIENT
Start: 2025-05-21

## 2025-05-22 ENCOUNTER — PATIENT MESSAGE (OUTPATIENT)
Dept: OBSTETRICS AND GYNECOLOGY | Facility: CLINIC | Age: 65
End: 2025-05-22
Payer: COMMERCIAL

## 2025-08-05 ENCOUNTER — OFFICE VISIT (OUTPATIENT)
Facility: CLINIC | Age: 65
End: 2025-08-05
Payer: COMMERCIAL

## 2025-08-05 VITALS
DIASTOLIC BLOOD PRESSURE: 82 MMHG | SYSTOLIC BLOOD PRESSURE: 121 MMHG | HEIGHT: 63 IN | BODY MASS INDEX: 28.08 KG/M2 | HEART RATE: 73 BPM | WEIGHT: 158.5 LBS

## 2025-08-05 DIAGNOSIS — R00.2 PALPITATIONS: ICD-10-CM

## 2025-08-05 DIAGNOSIS — I49.1 PREMATURE ATRIAL CONTRACTIONS: Primary | ICD-10-CM

## 2025-08-05 PROCEDURE — 99214 OFFICE O/P EST MOD 30 MIN: CPT | Mod: S$GLB,,,

## 2025-08-05 NOTE — PROGRESS NOTES
[facility]  Date: 8/5/25    Patient: Renate Vang, 1960, [id number]  Primary Care Provider: Sameera Dudley MD     Chief Complaint: Follow up     Subjective:       Renate Vang is a 64 y.o. female who presents for follow up. They follow with Dr. Salazar.    CBC, BMP, TSH stable. HDL 86. .6. At last visit, patient with stable palpitations- EP recommended continued medical management.    Since then, patient reports progressive palpitations. States the only identifiable trigger is stress. Average HR 60-90s at home. Denies chest discomfort, dyspnea, dizziness, syncope, orthopnea, PND, edema.    Focused Past History includes:  Premature atrial contractions  72 Hr Holter 5/2023: predominantly sinus rhythm avg HR 75 bpm, 22.75% SVE, 0.16% PVCs  Stress EKG 6/2023: no ischemia  TTE 8/2021: LVEF 65%  Family history of atherosclerotic disease  Father with CVA, atrial fibrillation  Sister with CVA  Maternal grandmother with MI   Paternal grandfather with MI    Current Outpatient Medications   Medication Sig    ALPRAZolam (XANAX) 0.25 MG tablet Take 1 tablet (0.25 mg total) by mouth daily as needed for Anxiety.    calcium-vitamin D (OSCAL) 250 (625)-125 mg-unit per tablet Take 1 tablet by mouth once daily.    cyclobenzaprine (FLEXERIL) 10 MG tablet TAKE 1 TABLET BY MOUTH EVERY DAY NIGHTLY AS NEEDED FOR MUSCLE SPASMS    diltiaZEM (CARDIZEM CD) 180 MG 24 hr capsule TAKE 1 CAPSULE BY MOUTH EVERY DAY    erythromycin (ROMYCIN) ophthalmic ointment Place into the left eye every evening.    estradioL (ESTRACE) 0.01 % (0.1 mg/gram) vaginal cream Place vaginally.    glucosamine-chondroitin 500-400 mg tablet Take 1 tablet by mouth 2 (two) times daily.    Lactobacillus rhamnosus GG (CULTURELLE) 10 billion cell capsule Take 1 capsule by mouth once daily.    magnesium oxide (MAG-OX) 400 mg (241.3 mg magnesium) tablet Take 400 mg by mouth once daily.    ondansetron (ZOFRAN-ODT) 8 MG TbDL TAKE 1  TABLET (8 MG TOTAL) BY MOUTH EVERY 8 (EIGHT) HOURS AS NEEDED (NAUSEA VOMITTING    RESTASIS 0.05 % ophthalmic emulsion     vit A/vit C/vit E/zinc/copper (ICAPS AREDS ORAL) Take by mouth.    vitamin D (VITAMIN D3) 1000 units Tab Take 600 Units by mouth once daily.     vitamin E 1000 UNIT capsule Take 1,000 Units by mouth once daily. 5000 UNITS    zinc gluconate 50 mg tablet Take 25 mg by mouth once daily.      No current facility-administered medications for this visit.            Objective       Review of Systems  Constitutional: negative for fevers, night sweats, and weight loss  Eyes: negative for visual disturbance, diplopia  Respiratory: negative for cough, hemoptysis, sputum, and wheezing  Cardiovascular: see HPI  Gastrointestinal: negative for abdominal pain, bright red blood per rectum, change in bowel habits, dysphagia, melena, and reflux symptoms  Genitourinary:negative for dysuria, frequency, and hematuria  Hematologic/lymphatic: negative for bleeding, easy bruising, and lymphadenopathy  Musculoskeletal:negative for arthralgias, back pain, and myalgias  Neurological: negative for gait problems, paresthesia, speech problems, vertigo, and weakness  Behavioral/Psych: negative for excessive alcohol consumption, illegal drug usage, and sleep disturbance    -------------------------------------    Abnormal Pap smear of cervix    Breast cancer    right breast    Cervical spinal stenosis    with mild spinal and mild - severe foraminal stenosis 3/25    Depression    History of abnormal cervical Pap smear    History of basal cell carcinoma    right axilla    History of breast cancer    at 42 s/p surg and radiation [id number] ER+    History of cardiovascular stress test    normal 6/23    History of HPV infection    LOW RISK    History of parathyroidectomy    R lower gland removed due to adenoma    Kidney stones    Osteopenia    last dexa - refuses additional testing     PONV (postoperative nausea and vomiting)     Unspecified macular degeneration    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)     ----------------------------    Augmentation of breast    replaced 2008    Breast biopsy    Breast lumpectomy    x2 Right with senital node bx, with radiation    Breast reconstruction    s/p lumpectomy to replace previous implants bilaterally    Breast surgery    breast augmentation prior to lumpectomy    Cataract surgery     section, low transverse    x2    Colonoscopy    Procedure: COLONOSCOPY;  Surgeon: Khanh Forrester Jr., MD;  Location: [facility] ENDO;  Service: Endoscopy;  Laterality: N/A;    Colonoscopy    Procedure: COLONOSCOPY;  Surgeon: Khanh Forrester Jr., MD;  Location: [facility] ENDO;  Service: Endoscopy;  Laterality: N/A;    Colonoscopy w/ polypectomy    HERBIE.   One 1 mm polyp at the hepatic flexure. TUBULAR ADENOMATOUS POLYP.  One 1 mm polyp in the cecum. TUBULAR ADENOMATOUS POLYP.  The examination was otherwise normal.      Hernia repair    bilateral groin     Laser ablation of condylomas    Malignant skin lesion excision    nose & under right arm    Parathyroid gland surgery    R lower gland removed due to adenoma    Tonsillectomy        Family History   Problem Relation Name Age of Onset    Glaucoma Mother      Osteoarthritis Mother      Arrhythmia Mother      Fibromyalgia Mother      Kidney disease Mother      Hypertension Father      Hashimoto's thyroiditis Sister      Anxiety disorder Sister      Cancer Maternal Uncle          colon    Heart disease Maternal Grandmother      COPD Maternal Grandmother      Tuberculosis Maternal Grandmother      Hypertension Maternal Grandmother      COPD Maternal Grandfather      Osteoarthritis Maternal Grandfather      COPD Paternal Grandfather      Breast cancer Cousin paternal 1st         40's    Ovarian cancer Neg Hx       Social History     Tobacco Use    Smoking status: Never    Smokeless tobacco: Never   Substance Use Topics    Alcohol use: Yes     Alcohol/week:  "4.0 standard drinks of alcohol     Types: 4 Glasses of wine per week     Comment: 2 drinks three times per week    Drug use: Yes     Types: Benzodiazepines       Physical Exam  /82 (BP Location: Left arm, Patient Position: Sitting)   Pulse 73   Ht 5' 3" (1.6 m)   Wt 71.9 kg (158 lb 8.2 oz)   LMP 09/20/2006   BMI 28.08 kg/m²   Body surface area is 1.79 meters squared.  Body mass index is 28.08 kg/m².    General appearance: alert, appears stated age, cooperative, and no distress  Head: Normocephalic, without obvious abnormality, atraumatic  Neck: no carotid bruit, no JVD, and supple, symmetrical, trachea midline  Lungs: clear to auscultation bilaterally  Heart: regular rate and rhythm; S1, S2 normal, no murmur, click, rub or gallop  Abdomen: soft, non-tender, no distended  Extremities: extremities atraumatic, no pitting edema  Skin: warm, no cyanosis, no pathologic ecchymosis in exposed portions  Neurologic: Grossly normal. A&O x3      Lab Review   Lab Results   Component Value Date    WBC 6.56 02/17/2025    HGB 14.1 02/17/2025    HCT 43.0 02/17/2025    MCV 90 02/17/2025     02/17/2025         BMP  Lab Results   Component Value Date     02/17/2025    K 5.0 02/17/2025     02/17/2025    CO2 29 02/17/2025    BUN 11 02/17/2025    CREATININE 0.7 03/28/2025    CALCIUM 9.4 02/17/2025    ANIONGAP 6 (L) 02/17/2025    ESTGFRAFRICA >60.0 07/07/2022    EGFRNONAA >60.0 07/07/2022       Lab Results   Component Value Date    ALBUMIN 4.0 02/17/2025       Lab Results   Component Value Date    ALT 16 02/17/2025    AST 26 02/17/2025    ALKPHOS 75 02/17/2025    BILITOT 0.8 02/17/2025       Lab Results   Component Value Date    TSH 1.382 02/17/2025       Lab Results   Component Value Date    CHOL 234 (H) 02/17/2025    CHOL 272 (H) 10/13/2023    CHOL 257 (H) 09/23/2022     Lab Results   Component Value Date    HDL 80 (H) 02/17/2025    HDL 86 (H) 10/13/2023    HDL 75 09/23/2022     Lab Results   Component Value " Date    LDLCALC 130.0 02/17/2025    LDLCALC 169.6 (H) 10/13/2023    LDLCALC 164.6 (H) 09/23/2022     Lab Results   Component Value Date    TRIG 120 02/17/2025    TRIG 82 10/13/2023    TRIG 87 09/23/2022     Lab Results   Component Value Date    CHOLHDL 34.2 02/17/2025    CHOLHDL 31.6 10/13/2023    CHOLHDL 29.2 09/23/2022             Assessment & Plan:     This is a 64 y.o. female with PACs. Reports progressive palpitations, however, average HR 60-90s. No evidence of arrhythmias on AppleWatch or during health visits.       1. Premature atrial contractions  - Progressive palpitations   - Rate controlled today in clinic  - Continue diltiazem  mg qd    - Repeat TTE     Emphasized the importance of modifying lifestyle related risk factors including limiting alcohol intake, exercise, diet most resembling a Mediterranean diet.    Please follow up in 9-12 months or sooner if needed.      Annabel Patel PA-C  [facility]  Office: [phone]

## 2025-08-06 ENCOUNTER — TELEPHONE (OUTPATIENT)
Facility: CLINIC | Age: 65
End: 2025-08-06
Payer: COMMERCIAL

## 2025-08-14 ENCOUNTER — HOSPITAL ENCOUNTER (OUTPATIENT)
Dept: CARDIOLOGY | Facility: HOSPITAL | Age: 65
Discharge: HOME | End: 2025-08-14
Payer: COMMERCIAL

## 2025-08-14 VITALS — BODY MASS INDEX: 28 KG/M2 | WEIGHT: 158 LBS | HEIGHT: 63 IN

## 2025-08-14 DIAGNOSIS — I49.1 PREMATURE ATRIAL CONTRACTIONS: ICD-10-CM

## 2025-08-14 LAB
AORTIC SIZE INDEX (SOV): 1.5 CM/M2
AORTIC SIZE INDEX: 1.7 CM/M2
ASCENDING AORTA: 2.9 CM
AV INDEX (PROSTH): 0.9
AV MEAN GRADIENT: 3 MMHG
AV PEAK GRADIENT: 7 MMHG
AV VALVE AREA BY VELOCITY RATIO: 2.4 CM²
AV VALVE AREA: 2.8 CM²
AV VELOCITY RATIO: 0.77
BSA FOR ECHO PROCEDURE: 1.78 M2
CV ECHO LV RWT: 0.39 CM
DOP CALC AO PEAK VEL: 1.3 M/S
DOP CALC AO VTI: 25.6 CM
DOP CALC LVOT AREA: 3.1 CM2
DOP CALC LVOT DIAMETER: 2 CM
DOP CALC LVOT PEAK VEL: 1 M/S
DOP CALCLVOT PEAK VEL VTI: 23.1 CM
E WAVE DECELERATION TIME: 175 MSEC
E/A RATIO: 0.94
E/E' RATIO: 10 M/S
ECHO LV POSTERIOR WALL: 0.8 CM (ref 0.6–1.1)
EJECTION FRACTION: 65 %
FRACTIONAL SHORTENING: 31.7 % (ref 28–44)
INTERVENTRICULAR SEPTUM: 1.1 CM (ref 0.6–1.1)
LEFT ATRIUM AREA SYSTOLIC (APICAL 2 CHAMBER): 16.37 CM2
LEFT ATRIUM AREA SYSTOLIC (APICAL 4 CHAMBER): 15.92 CM2
LEFT ATRIUM SIZE: 3.3 CM
LEFT ATRIUM VOLUME INDEX MOD: 25 ML/M2
LEFT ATRIUM VOLUME MOD: 43 ML
LEFT INTERNAL DIMENSION IN SYSTOLE: 2.8 CM (ref 2.1–4)
LEFT VENTRICLE DIASTOLIC VOLUME INDEX: 43.43 ML/M2
LEFT VENTRICLE DIASTOLIC VOLUME: 76 ML
LEFT VENTRICLE END SYSTOLIC VOLUME APICAL 2 CHAMBER: 40.69 ML
LEFT VENTRICLE END SYSTOLIC VOLUME APICAL 4 CHAMBER: 41.68 ML
LEFT VENTRICLE MASS INDEX: 70.3 G/M2
LEFT VENTRICLE SYSTOLIC VOLUME INDEX: 16.6 ML/M2
LEFT VENTRICLE SYSTOLIC VOLUME: 29 ML
LEFT VENTRICULAR INTERNAL DIMENSION IN DIASTOLE: 4.1 CM (ref 3.5–6)
LEFT VENTRICULAR MASS: 123 G
LV LATERAL E/E' RATIO: 8.4 M/S
LV SEPTAL E/E' RATIO: 10.9 M/S
LVED V (TEICH): 76.12 ML
LVES V (TEICH): 29.44 ML
LVOT MG: 2.49 MMHG
LVOT MV: 0.75 CM/S
Lab: 1.7 CM/M
Lab: 1.8 CM/M
MV PEAK A VEL: 0.81 M/S
MV PEAK E VEL: 0.76 M/S
MV STENOSIS PRESSURE HALF TIME: 50.7 MS
MV VALVE AREA P 1/2 METHOD: 4.34 CM2
OHS CV CPX PATIENT HEIGHT IN: 63
OHS CV RV/LV RATIO: 0.9 CM
PISA TR MAX VEL: 2.2 M/S
PULM VEIN S/D RATIO: 1.31
PV PEAK D VEL: 0.42 M/S
PV PEAK S VEL: 0.55 M/S
RA PRESSURE ESTIMATED: 3 MMHG
RA VOL SYS: 23.76 ML
RIGHT ATRIAL AREA: 11.1 CM2
RIGHT ATRIUM END SYSTOLIC VOLUME APICAL 4 CHAMBER INDEX BSA: 12.79 ML/M2
RIGHT ATRIUM VOLUME AREA LENGTH APICAL 4 CHAMBER: 22.39 ML
RIGHT VENTRICLE DIASTOLIC BASEL DIMENSION: 3.7 CM
RIGHT VENTRICLE DIASTOLIC LENGTH: 6.2 CM
RIGHT VENTRICLE DIASTOLIC MID DIMENSION: 2.6 CM
RIGHT VENTRICULAR END-DIASTOLIC DIMENSION: 3.67 CM
RIGHT VENTRICULAR LENGTH IN DIASTOLE (APICAL 4-CHAMBER VIEW): 6.16 CM
RV MID DIAMA: 2.55 CM
RV TB RVSP: 5 MMHG
RV TISSUE DOPPLER FREE WALL SYSTOLIC VELOCITY 1 (APICAL 4 CHAMBER VIEW): 13.49 CM/S
SINUS: 2.7 CM
STJ: 2.6 CM
TDI LATERAL: 0.09 M/S
TDI SEPTAL: 0.07 M/S
TDI: 0.08 M/S
TR MAX PG: 20 MMHG
TRICUSPID ANNULAR PLANE SYSTOLIC EXCURSION: 2.4 CM
TV REST PULMONARY ARTERY PRESSURE: 22 MMHG
Z-SCORE OF LEFT VENTRICULAR DIMENSION IN END DIASTOLE: -1.66
Z-SCORE OF LEFT VENTRICULAR DIMENSION IN END SYSTOLE: -0.53

## 2025-08-14 PROCEDURE — 93306 TTE W/DOPPLER COMPLETE: CPT | Mod: PO

## 2025-08-14 PROCEDURE — 93306 TTE W/DOPPLER COMPLETE: CPT | Mod: 26,,, | Performed by: INTERNAL MEDICINE
